# Patient Record
Sex: FEMALE | Race: WHITE | NOT HISPANIC OR LATINO | Employment: FULL TIME | URBAN - NONMETROPOLITAN AREA
[De-identification: names, ages, dates, MRNs, and addresses within clinical notes are randomized per-mention and may not be internally consistent; named-entity substitution may affect disease eponyms.]

---

## 2017-02-06 ENCOUNTER — TELEPHONE (OUTPATIENT)
Dept: INTERNAL MEDICINE | Facility: CLINIC | Age: 47
End: 2017-02-06

## 2017-02-06 DIAGNOSIS — E05.00 GRAVES' DISEASE: ICD-10-CM

## 2017-02-06 DIAGNOSIS — L40.9 PSORIASIS: Primary | ICD-10-CM

## 2017-02-06 RX ORDER — METHIMAZOLE 10 MG/1
20 TABLET ORAL 2 TIMES DAILY
Qty: 120 TABLET | Refills: 3 | Status: SHIPPED | OUTPATIENT
Start: 2017-02-06 | End: 2017-05-05

## 2017-02-06 NOTE — TELEPHONE ENCOUNTER
----- Message from Dominique  sent at 2/6/2017 11:44 AM CST -----  Regarding: Thyroid Medication  Contact: [phone]  She states that she doesn't think that her dosage of Thyroid medication is high enough.  She needs to know what she needs to do about getting an increase.

## 2017-05-05 ENCOUNTER — OFFICE VISIT (OUTPATIENT)
Dept: ENDOCRINOLOGY | Facility: CLINIC | Age: 47
End: 2017-05-05

## 2017-05-05 ENCOUNTER — LAB (OUTPATIENT)
Dept: LAB | Facility: HOSPITAL | Age: 47
End: 2017-05-05

## 2017-05-05 VITALS
HEART RATE: 110 BPM | DIASTOLIC BLOOD PRESSURE: 80 MMHG | SYSTOLIC BLOOD PRESSURE: 132 MMHG | BODY MASS INDEX: 34.64 KG/M2 | WEIGHT: 183.5 LBS | HEIGHT: 61 IN

## 2017-05-05 DIAGNOSIS — E55.9 VITAMIN D DEFICIENCY: ICD-10-CM

## 2017-05-05 DIAGNOSIS — E05.00 GRAVES' DISEASE: Primary | ICD-10-CM

## 2017-05-05 DIAGNOSIS — E53.8 B12 DEFICIENCY: ICD-10-CM

## 2017-05-05 DIAGNOSIS — L40.9 PSORIASIS: ICD-10-CM

## 2017-05-05 DIAGNOSIS — E05.00 GRAVES' DISEASE: ICD-10-CM

## 2017-05-05 LAB
25(OH)D3 SERPL-MCNC: <12.8 NG/ML (ref 30–100)
T3 SERPL-MCNC: 416 NG/DL (ref 97–169)
T4 FREE SERPL-MCNC: 4.86 NG/DL (ref 0.78–2.19)
TSH SERPL DL<=0.05 MIU/L-ACNC: <0.02 MIU/ML (ref 0.46–4.68)
VIT B12 BLD-MCNC: 512 PG/ML (ref 239–931)

## 2017-05-05 PROCEDURE — 84480 ASSAY TRIIODOTHYRONINE (T3): CPT | Performed by: INTERNAL MEDICINE

## 2017-05-05 PROCEDURE — 82306 VITAMIN D 25 HYDROXY: CPT | Performed by: INTERNAL MEDICINE

## 2017-05-05 PROCEDURE — 84443 ASSAY THYROID STIM HORMONE: CPT | Performed by: INTERNAL MEDICINE

## 2017-05-05 PROCEDURE — 86376 MICROSOMAL ANTIBODY EACH: CPT | Performed by: INTERNAL MEDICINE

## 2017-05-05 PROCEDURE — 84439 ASSAY OF FREE THYROXINE: CPT | Performed by: INTERNAL MEDICINE

## 2017-05-05 PROCEDURE — 36415 COLL VENOUS BLD VENIPUNCTURE: CPT

## 2017-05-05 PROCEDURE — 83516 IMMUNOASSAY NONANTIBODY: CPT | Performed by: INTERNAL MEDICINE

## 2017-05-05 PROCEDURE — 84481 FREE ASSAY (FT-3): CPT | Performed by: INTERNAL MEDICINE

## 2017-05-05 PROCEDURE — 82607 VITAMIN B-12: CPT | Performed by: INTERNAL MEDICINE

## 2017-05-05 PROCEDURE — 99204 OFFICE O/P NEW MOD 45 MIN: CPT | Performed by: INTERNAL MEDICINE

## 2017-05-05 PROCEDURE — 84445 ASSAY OF TSI GLOBULIN: CPT | Performed by: INTERNAL MEDICINE

## 2017-05-05 RX ORDER — CLOBETASOL PROPIONATE 0.5 MG/G
OINTMENT TOPICAL 2 TIMES DAILY
Qty: 2 EACH | Refills: 11 | Status: SHIPPED | OUTPATIENT
Start: 2017-05-05 | End: 2017-07-14

## 2017-05-05 RX ORDER — METHIMAZOLE 10 MG/1
10 TABLET ORAL 3 TIMES DAILY
Qty: 90 TABLET | Refills: 11 | Status: SHIPPED | OUTPATIENT
Start: 2017-05-05 | End: 2017-05-05

## 2017-05-05 RX ORDER — METHIMAZOLE 10 MG/1
10 TABLET ORAL DAILY
Qty: 30 TABLET | Refills: 11 | Status: SHIPPED | OUTPATIENT
Start: 2017-05-05 | End: 2018-05-05

## 2017-05-05 RX ORDER — ATENOLOL 25 MG/1
25 TABLET ORAL DAILY
Qty: 30 TABLET | Refills: 11 | Status: SHIPPED | OUTPATIENT
Start: 2017-05-05 | End: 2018-02-05

## 2017-05-07 LAB
T3FREE SERPL-MCNC: 14.7 PG/ML (ref 2–4.4)
THYROPEROXIDASE AB SERPL-ACNC: 137 IU/ML (ref 0–34)

## 2017-05-08 LAB
GLIADIN PEPTIDE IGA SER-ACNC: 5 UNITS (ref 0–19)
IGA SERPL-MCNC: 166 MG/DL (ref 87–352)
TTG IGA SER-ACNC: <2 U/ML (ref 0–3)

## 2017-05-10 LAB — TSI ACT/NOR SER: 405 % (ref 0–139)

## 2017-06-20 DIAGNOSIS — F41.9 PAIN AGGRAVATED BY ANXIETY: Primary | ICD-10-CM

## 2017-06-20 DIAGNOSIS — F45.41 PAIN AGGRAVATED BY ANXIETY: Primary | ICD-10-CM

## 2017-06-20 PROCEDURE — 96372 THER/PROPH/DIAG INJ SC/IM: CPT | Performed by: INTERNAL MEDICINE

## 2017-06-20 RX ORDER — TRIAMCINOLONE ACETONIDE 40 MG/ML
40 INJECTION, SUSPENSION INTRA-ARTICULAR; INTRAMUSCULAR ONCE
Status: COMPLETED | OUTPATIENT
Start: 2017-06-20 | End: 2017-06-20

## 2017-06-20 RX ADMIN — TRIAMCINOLONE ACETONIDE 40 MG: 40 INJECTION, SUSPENSION INTRA-ARTICULAR; INTRAMUSCULAR at 11:42

## 2017-06-21 ENCOUNTER — LAB (OUTPATIENT)
Dept: LAB | Facility: HOSPITAL | Age: 47
End: 2017-06-21

## 2017-06-21 ENCOUNTER — APPOINTMENT (OUTPATIENT)
Dept: LAB | Facility: HOSPITAL | Age: 47
End: 2017-06-21

## 2017-06-21 DIAGNOSIS — L40.9 PSORIASIS: ICD-10-CM

## 2017-06-21 DIAGNOSIS — E55.9 VITAMIN D DEFICIENCY: ICD-10-CM

## 2017-06-21 DIAGNOSIS — E05.00 GRAVES' DISEASE: ICD-10-CM

## 2017-06-21 DIAGNOSIS — E53.8 B12 DEFICIENCY: ICD-10-CM

## 2017-06-21 LAB
T4 FREE SERPL-MCNC: 2.03 NG/DL (ref 0.78–2.19)
TSH SERPL DL<=0.05 MIU/L-ACNC: <0.02 MIU/ML (ref 0.47–4.68)

## 2017-06-21 PROCEDURE — 84443 ASSAY THYROID STIM HORMONE: CPT | Performed by: INTERNAL MEDICINE

## 2017-06-21 PROCEDURE — 84439 ASSAY OF FREE THYROXINE: CPT | Performed by: INTERNAL MEDICINE

## 2017-06-21 PROCEDURE — 86038 ANTINUCLEAR ANTIBODIES: CPT | Performed by: INTERNAL MEDICINE

## 2017-06-21 PROCEDURE — 84480 ASSAY TRIIODOTHYRONINE (T3): CPT | Performed by: INTERNAL MEDICINE

## 2017-06-21 PROCEDURE — 36415 COLL VENOUS BLD VENIPUNCTURE: CPT

## 2017-06-23 LAB
ANA SER QL: NEGATIVE
Lab: NORMAL
T3 SERPL-MCNC: 92 NG/DL (ref 71–180)

## 2017-09-05 ENCOUNTER — OFFICE VISIT (OUTPATIENT)
Dept: ENDOCRINOLOGY | Facility: CLINIC | Age: 47
End: 2017-09-05

## 2017-09-05 VITALS
DIASTOLIC BLOOD PRESSURE: 80 MMHG | HEART RATE: 67 BPM | WEIGHT: 187.2 LBS | BODY MASS INDEX: 35.34 KG/M2 | HEIGHT: 61 IN | SYSTOLIC BLOOD PRESSURE: 132 MMHG

## 2017-09-05 DIAGNOSIS — E55.9 VITAMIN D DEFICIENCY: ICD-10-CM

## 2017-09-05 DIAGNOSIS — E53.8 B12 DEFICIENCY: ICD-10-CM

## 2017-09-05 DIAGNOSIS — E05.00 GRAVES' DISEASE: Primary | ICD-10-CM

## 2017-09-05 PROCEDURE — 99214 OFFICE O/P EST MOD 30 MIN: CPT | Performed by: INTERNAL MEDICINE

## 2017-09-05 NOTE — PROGRESS NOTES
Nicolas Amaro is a 47 y.o. female who presents for  evaluation of   Chief Complaint   Patient presents with   • Thyroid Problem       Referring provider  Primary Care Provider    Donny Rincon MD    Duration 10 years    Timing - Graves' Disease is constant    Started as Hypothyroidism , transitioned to hyperthyroidism     Quality -  poorly controlled before now taking methimazole     Severity -  moderate    Complications - Thyroid storm in the past    Current symptoms/problems  see ROS        Past Medical History:   Diagnosis Date   • Autoimmune thyroiditis    • Graves disease    • Graves disease    • History of cold-induced urticaria    • Migraines    • Multiple exostosis, hereditary    • Osteochondral defect      Family History   Problem Relation Age of Onset   • Kidney disease Father    • Diabetes Son    • Heart disease Maternal Grandmother    • Cancer Paternal Grandfather      Social History   Substance Use Topics   • Smoking status: Never Smoker   • Smokeless tobacco: Never Used   • Alcohol use No         Current Outpatient Prescriptions:   •  atenolol (TENORMIN) 25 MG tablet, Take 1 tablet by mouth Daily., Disp: 30 tablet, Rfl: 11  •  methIMAzole (TAPAZOLE) 10 MG tablet, Take 1 tablet by mouth Daily., Disp: 30 tablet, Rfl: 11    Review of Systems    Review of Systems   Constitutional: Positive for unexpected weight change. Negative for activity change, appetite change, chills, diaphoresis, fatigue and fever.   HENT: Negative for congestion, dental problem, drooling, ear discharge, ear pain, facial swelling, mouth sores, postnasal drip, rhinorrhea, sinus pressure, sore throat, tinnitus, trouble swallowing and voice change.    Eyes: Negative for photophobia, pain, discharge, redness, itching and visual disturbance.   Respiratory: Negative for apnea, cough, choking, chest tightness, shortness of breath, wheezing and stridor.    Cardiovascular: Negative for chest pain, palpitations and leg swelling.  "  Gastrointestinal: Negative for abdominal distention, abdominal pain, constipation, diarrhea, nausea and vomiting.   Endocrine: Positive for polyuria. Negative for cold intolerance, heat intolerance, polydipsia and polyphagia.   Genitourinary: Negative for decreased urine volume, difficulty urinating, dysuria, flank pain, frequency, hematuria, menstrual problem and urgency.   Musculoskeletal: Negative for arthralgias, back pain, gait problem, joint swelling, myalgias, neck pain and neck stiffness.   Skin: Negative for color change, pallor, rash and wound.   Allergic/Immunologic: Negative for immunocompromised state.   Neurological: Negative for dizziness, seizures, syncope, facial asymmetry, speech difficulty, weakness, light-headedness, numbness and headaches.   Hematological: Negative for adenopathy.   Psychiatric/Behavioral: Negative for agitation, behavioral problems, confusion, decreased concentration, dysphoric mood, hallucinations, self-injury, sleep disturbance and suicidal ideas. The patient is not nervous/anxious and is not hyperactive.         Objective:   /80 (BP Location: Right arm, Patient Position: Sitting, Cuff Size: Adult)  Pulse 67  Ht 61\" (154.9 cm)  Wt 187 lb 3.2 oz (84.9 kg)  BMI 35.37 kg/m2    Physical Exam   Constitutional: She is oriented to person, place, and time. She appears well-developed.   HENT:   Head: Normocephalic.   Right Ear: External ear normal.   Left Ear: External ear normal.   Nose: Nose normal.   Eyes: Conjunctivae and EOM are normal. No scleral icterus.   Neck: Normal range of motion. Neck supple. No tracheal deviation present. No thyromegaly present.   Cardiovascular: Normal rate, regular rhythm, normal heart sounds and intact distal pulses.  Exam reveals no gallop and no friction rub.    No murmur heard.  Pulmonary/Chest: Effort normal and breath sounds normal. No stridor. No respiratory distress. She has no wheezes. She has no rales. She exhibits no tenderness. "   Abdominal: Soft. Bowel sounds are normal. She exhibits no distension and no mass. There is no tenderness. There is no rebound and no guarding.   Musculoskeletal: Normal range of motion. She exhibits no tenderness or deformity.   Lymphadenopathy:     She has no cervical adenopathy.   Neurological: She is alert and oriented to person, place, and time. She displays normal reflexes. She exhibits normal muscle tone. Coordination normal.   Skin: No rash noted. No erythema. No pallor.   Psoriatic plaques feet    Psychiatric: She has a normal mood and affect. Her behavior is normal. Judgment and thought content normal.       Lab Review    Results for orders placed or performed in visit on 06/21/17   TSH   Result Value Ref Range    TSH <0.020 (L) 0.470 - 4.680 mIU/mL   T4, Free   Result Value Ref Range    Free T4 2.03 0.78 - 2.19 ng/dL   T3   Result Value Ref Range    T3, Total 92 71 - 180 ng/dL           Assessment/Plan       ICD-10-CM ICD-9-CM   1. Graves' disease E05.00 242.00   2. B12 deficiency E53.8 266.2   3. Vitamin D deficiency E55.9 268.9       Lab Results   Component Value Date    TSH <0.020 (L) 06/21/2017    TSH <0.020 (L) 05/05/2017    TSH <0.02 (L) 06/22/2016       Lab Results   Component Value Date    FREET4 2.03 06/21/2017    FREET4 4.86 (H) 05/05/2017    FREET4 4.13 (H) 06/22/2016       Lab Results   Component Value Date    V1XVSAF 92 06/21/2017    E6NISCX 416.0 (H) 05/05/2017       Thyroid Antibodies  TPO AB  Lab Results   Component Value Date    THYROIDAB 137 (H) 05/05/2017       Thyroid Stimulating Immunoglobulin    Lab Results   Component Value Date    LABTHYR 405 (H) 05/05/2017         Methimazole 15 mg daily     atenolol 25 mg daily         Other Diabetes Related Aspects       Lab Results   Component Value Date    CJWBIDFL72 512 05/05/2017       Assess       Last celiac panel     Lab Results   Component Value Date    GDPABIGA 5 05/05/2017    TTRANSGLIGA <2 05/05/2017     05/05/2017        Assess      psoariasis     Topical steroid    Kenalog injection, before     Arthralgias,     We ruled out celiac and sle in May 2017    I reviewed and summarized records from Donny Rincon MD from 2017 and I reviewed / ordered labs.     Orders Placed This Encounter   Procedures   • Thyroid Stimulating Immunoglobulin   • Thyroid Peroxidase Antibody   • Thyroid Antibodies   • T4, Free   • T3   • Comprehensive Metabolic Panel   • Comprehensive Metabolic Panel     Standing Status:   Future   • TSH     Standing Status:   Future   • T4, Free     Standing Status:   Future   • T3     Standing Status:   Future   • CBC & Differential     Order Specific Question:   Manual Differential     Answer:   No   • CBC & Differential     Standing Status:   Future     Order Specific Question:   Manual Differential     Answer:   No         A copy of my note was sent to Donny Rincon MD    Please see my above opinion and suggestions.

## 2017-12-15 ENCOUNTER — LAB (OUTPATIENT)
Dept: LAB | Facility: HOSPITAL | Age: 47
End: 2017-12-15

## 2017-12-15 DIAGNOSIS — L40.9 PSORIASIS: ICD-10-CM

## 2017-12-15 DIAGNOSIS — E53.8 B12 DEFICIENCY: ICD-10-CM

## 2017-12-15 DIAGNOSIS — E55.9 VITAMIN D DEFICIENCY: ICD-10-CM

## 2017-12-15 DIAGNOSIS — E05.00 GRAVES' DISEASE: ICD-10-CM

## 2017-12-15 LAB
ALBUMIN SERPL-MCNC: 4.6 G/DL (ref 3.5–5)
ALBUMIN/GLOB SERPL: 1.4 G/DL (ref 1.1–2.5)
ALP SERPL-CCNC: 135 U/L (ref 24–120)
ALT SERPL W P-5'-P-CCNC: 42 U/L (ref 0–54)
ANION GAP SERPL CALCULATED.3IONS-SCNC: 10 MMOL/L (ref 4–13)
AST SERPL-CCNC: 26 U/L (ref 7–45)
BASOPHILS # BLD AUTO: 0.03 10*3/MM3 (ref 0–0.2)
BASOPHILS NFR BLD AUTO: 0.4 % (ref 0–2)
BILIRUB SERPL-MCNC: 0.3 MG/DL (ref 0.1–1)
BUN BLD-MCNC: 15 MG/DL (ref 5–21)
BUN/CREAT SERPL: 15.6 (ref 7–25)
CALCIUM SPEC-SCNC: 9.2 MG/DL (ref 8.4–10.4)
CHLORIDE SERPL-SCNC: 103 MMOL/L (ref 98–110)
CO2 SERPL-SCNC: 29 MMOL/L (ref 24–31)
CREAT BLD-MCNC: 0.96 MG/DL (ref 0.5–1.4)
DEPRECATED RDW RBC AUTO: 43.4 FL (ref 40–54)
EOSINOPHIL # BLD AUTO: 0.08 10*3/MM3 (ref 0–0.7)
EOSINOPHIL NFR BLD AUTO: 0.9 % (ref 0–4)
ERYTHROCYTE [DISTWIDTH] IN BLOOD BY AUTOMATED COUNT: 13.2 % (ref 12–15)
GFR SERPL CREATININE-BSD FRML MDRD: 63 ML/MIN/1.73
GLOBULIN UR ELPH-MCNC: 3.3 GM/DL
GLUCOSE BLD-MCNC: 80 MG/DL (ref 70–100)
HCT VFR BLD AUTO: 38.3 % (ref 37–47)
HGB BLD-MCNC: 12.6 G/DL (ref 12–16)
IMM GRANULOCYTES # BLD: 0.01 10*3/MM3 (ref 0–0.03)
IMM GRANULOCYTES NFR BLD: 0.1 % (ref 0–5)
LYMPHOCYTES # BLD AUTO: 2.13 10*3/MM3 (ref 0.72–4.86)
LYMPHOCYTES NFR BLD AUTO: 25.1 % (ref 15–45)
MCH RBC QN AUTO: 29.2 PG (ref 28–32)
MCHC RBC AUTO-ENTMCNC: 32.9 G/DL (ref 33–36)
MCV RBC AUTO: 88.7 FL (ref 82–98)
MONOCYTES # BLD AUTO: 0.44 10*3/MM3 (ref 0.19–1.3)
MONOCYTES NFR BLD AUTO: 5.2 % (ref 4–12)
NEUTROPHILS # BLD AUTO: 5.78 10*3/MM3 (ref 1.87–8.4)
NEUTROPHILS NFR BLD AUTO: 68.3 % (ref 39–78)
NRBC BLD MANUAL-RTO: 0 /100 WBC (ref 0–0)
PLATELET # BLD AUTO: 247 10*3/MM3 (ref 130–400)
PMV BLD AUTO: 11.1 FL (ref 6–12)
POTASSIUM BLD-SCNC: 4 MMOL/L (ref 3.5–5.3)
PROT SERPL-MCNC: 7.9 G/DL (ref 6.3–8.7)
RBC # BLD AUTO: 4.32 10*6/MM3 (ref 4.2–5.4)
SODIUM BLD-SCNC: 142 MMOL/L (ref 135–145)
T4 FREE SERPL-MCNC: 1.14 NG/DL (ref 0.78–2.19)
TSH SERPL DL<=0.05 MIU/L-ACNC: 0.64 MIU/ML (ref 0.47–4.68)
WBC NRBC COR # BLD: 8.47 10*3/MM3 (ref 4.8–10.8)

## 2017-12-15 PROCEDURE — 86376 MICROSOMAL ANTIBODY EACH: CPT | Performed by: INTERNAL MEDICINE

## 2017-12-15 PROCEDURE — 84480 ASSAY TRIIODOTHYRONINE (T3): CPT | Performed by: INTERNAL MEDICINE

## 2017-12-15 PROCEDURE — 84439 ASSAY OF FREE THYROXINE: CPT | Performed by: INTERNAL MEDICINE

## 2017-12-15 PROCEDURE — 36415 COLL VENOUS BLD VENIPUNCTURE: CPT | Performed by: INTERNAL MEDICINE

## 2017-12-15 PROCEDURE — 86800 THYROGLOBULIN ANTIBODY: CPT | Performed by: INTERNAL MEDICINE

## 2017-12-15 PROCEDURE — 80050 GENERAL HEALTH PANEL: CPT | Performed by: INTERNAL MEDICINE

## 2017-12-15 PROCEDURE — 84445 ASSAY OF TSI GLOBULIN: CPT | Performed by: INTERNAL MEDICINE

## 2017-12-17 LAB — T3 SERPL-MCNC: 96 NG/DL (ref 71–180)

## 2017-12-18 LAB
THYROGLOB AB SERPL-ACNC: 1.3 IU/ML (ref 0–0.9)
THYROPEROXIDASE AB SERPL-ACNC: 104 IU/ML (ref 0–34)

## 2017-12-21 LAB — TSI ACT/NOR SER: 262 % (ref 0–139)

## 2018-04-10 PROCEDURE — 85025 COMPLETE CBC W/AUTO DIFF WBC: CPT | Performed by: NURSE PRACTITIONER

## 2018-04-10 PROCEDURE — 84439 ASSAY OF FREE THYROXINE: CPT | Performed by: NURSE PRACTITIONER

## 2018-04-10 PROCEDURE — 83001 ASSAY OF GONADOTROPIN (FSH): CPT | Performed by: NURSE PRACTITIONER

## 2018-04-10 PROCEDURE — 84443 ASSAY THYROID STIM HORMONE: CPT | Performed by: NURSE PRACTITIONER

## 2018-04-10 PROCEDURE — 80048 BASIC METABOLIC PNL TOTAL CA: CPT | Performed by: NURSE PRACTITIONER

## 2018-05-02 PROBLEM — F90.9 ADULT ATTENTION DEFICIT HYPERACTIVITY DISORDER: Status: ACTIVE | Noted: 2018-05-02

## 2018-05-02 PROCEDURE — 85025 COMPLETE CBC W/AUTO DIFF WBC: CPT | Performed by: NURSE PRACTITIONER

## 2018-05-04 ENCOUNTER — PROCEDURE VISIT (OUTPATIENT)
Dept: OBSTETRICS AND GYNECOLOGY | Facility: CLINIC | Age: 48
End: 2018-05-04

## 2018-05-04 ENCOUNTER — OFFICE VISIT (OUTPATIENT)
Dept: OBSTETRICS AND GYNECOLOGY | Facility: CLINIC | Age: 48
End: 2018-05-04

## 2018-05-04 VITALS
BODY MASS INDEX: 36.25 KG/M2 | SYSTOLIC BLOOD PRESSURE: 110 MMHG | HEIGHT: 61 IN | DIASTOLIC BLOOD PRESSURE: 70 MMHG | WEIGHT: 192 LBS

## 2018-05-04 DIAGNOSIS — N93.8 DUB (DYSFUNCTIONAL UTERINE BLEEDING): Primary | ICD-10-CM

## 2018-05-04 DIAGNOSIS — Z78.9 NON-SMOKER: ICD-10-CM

## 2018-05-04 DIAGNOSIS — R10.2 PELVIC PAIN: ICD-10-CM

## 2018-05-04 PROCEDURE — 99214 OFFICE O/P EST MOD 30 MIN: CPT | Performed by: NURSE PRACTITIONER

## 2018-05-04 PROCEDURE — 76830 TRANSVAGINAL US NON-OB: CPT | Performed by: OBSTETRICS & GYNECOLOGY

## 2018-05-04 RX ORDER — ATENOLOL 25 MG/1
25 TABLET ORAL DAILY
COMMUNITY
End: 2018-07-13 | Stop reason: SDUPTHER

## 2018-05-04 RX ORDER — MEDROXYPROGESTERONE ACETATE 10 MG/1
10 TABLET ORAL DAILY
Qty: 5 TABLET | Refills: 0 | Status: SHIPPED | OUTPATIENT
Start: 2018-05-04 | End: 2018-06-18

## 2018-05-04 NOTE — PROGRESS NOTES
Attempted to obtain health maintenance information, patient unable to provide answers for the following items Tdap.

## 2018-05-04 NOTE — PROGRESS NOTES
"Subjective   Nicolas Amaro is a 47 y.o. female.     Vaginal bleeding.     Pt reports constant bleeding since March 30. The bleeding varies from heavy to spotting with cramping.   Pt states normally periods are 3 wk 3 days apart, light with little cramping.       Vaginal Bleeding   The patient's primary symptoms include pelvic pain (cramping) and vaginal bleeding. The patient's pertinent negatives include no vaginal discharge. The current episode started 1 to 4 weeks ago. The problem occurs constantly. The problem has been waxing and waning. The pain is mild. The problem affects both sides. Pertinent negatives include no abdominal pain, constipation, diarrhea, fever, frequency, headaches, nausea or vomiting. Vaginal bleeding amount: varies from light spotting to heavy. She has not been passing clots. She has not been passing tissue. Nothing aggravates the symptoms. She has tried nothing for the symptoms. The treatment provided no relief. She uses tubal ligation for contraception. Menstrual history: regular until this episode.       The following portions of the patient's history were reviewed and updated as appropriate: allergies, current medications, past family history, past medical history, past social history, past surgical history and problem list.    /70 (BP Location: Left arm, Patient Position: Sitting, Cuff Size: Adult)   Ht 154.9 cm (61\")   Wt 87.1 kg (192 lb)   LMP 03/30/2018   BMI 36.28 kg/m²     Review of Systems   Constitutional: Negative for activity change, appetite change, fatigue and fever.        Pt denies changes in medications, wt, exercise, and diet.    Respiratory: Negative for apnea and shortness of breath.    Cardiovascular: Negative for chest pain and palpitations.   Gastrointestinal: Negative for abdominal distention, abdominal pain, constipation, diarrhea, nausea and vomiting.   Endocrine: Negative for cold intolerance and heat intolerance.   Genitourinary: Positive for " menstrual problem, pelvic pain (cramping) and vaginal bleeding. Negative for difficulty urinating, frequency, vaginal discharge and vaginal pain.   Neurological: Negative for headaches.   Psychiatric/Behavioral: Negative for agitation and sleep disturbance.       Objective   Physical Exam   Constitutional: She is oriented to person, place, and time. She appears well-developed.   Eyes: Right eye exhibits no discharge. Left eye exhibits no discharge.   Cardiovascular: Normal rate and regular rhythm.    Pulmonary/Chest: Effort normal and breath sounds normal.   Neurological: She is alert and oriented to person, place, and time.   Skin: Skin is warm.   Psychiatric: She has a normal mood and affect. Her behavior is normal. Judgment and thought content normal.   Vitals reviewed.      Assessment/Plan   Discussed US with pt.   US indicates endometrial thickness 17.22 mm, normal ovaries.    Pt declined pelvic exam today.   Discussed options to help stop the vaginal bleeding and further evaluation needed. Pt voiced understanding. Pt opts to take provera and return for annual exam and endometrial bx.     Patient's Body mass index is 36.28 kg/m². BMI is above normal parameters. Recommendations include: educational material.    RV endometrial bx and annual exam.   Nicolas was seen today for vaginal bleeding.    Diagnoses and all orders for this visit:    DUB (dysfunctional uterine bleeding)    BMI 36.0-36.9,adult    Non-smoker    Other orders  -     medroxyPROGESTERone (PROVERA) 10 MG tablet; Take 1 tablet by mouth Daily.

## 2018-05-09 NOTE — PATIENT INSTRUCTIONS

## 2018-05-29 RX ORDER — ATENOLOL 25 MG/1
TABLET ORAL
Qty: 30 TABLET | Refills: 0 | OUTPATIENT
Start: 2018-05-29

## 2018-06-18 ENCOUNTER — OFFICE VISIT (OUTPATIENT)
Dept: BARIATRICS/WEIGHT MGMT | Facility: CLINIC | Age: 48
End: 2018-06-18

## 2018-06-18 ENCOUNTER — OFFICE VISIT (OUTPATIENT)
Dept: BARIATRICS/WEIGHT MGMT | Facility: HOSPITAL | Age: 48
End: 2018-06-18

## 2018-06-18 VITALS
HEART RATE: 75 BPM | HEIGHT: 60 IN | WEIGHT: 190.6 LBS | BODY MASS INDEX: 37.42 KG/M2 | DIASTOLIC BLOOD PRESSURE: 79 MMHG | TEMPERATURE: 98.2 F | OXYGEN SATURATION: 99 % | SYSTOLIC BLOOD PRESSURE: 136 MMHG

## 2018-06-18 DIAGNOSIS — R03.0 ELEVATED BLOOD PRESSURE READING WITHOUT DIAGNOSIS OF HYPERTENSION: ICD-10-CM

## 2018-06-18 DIAGNOSIS — Z86.69 HISTORY OF MIGRAINE: ICD-10-CM

## 2018-06-18 DIAGNOSIS — E66.9 OBESITY, CLASS II, BMI 35-39.9: Primary | ICD-10-CM

## 2018-06-18 PROCEDURE — 97802 MEDICAL NUTRITION INDIV IN: CPT

## 2018-06-18 PROCEDURE — 99214 OFFICE O/P EST MOD 30 MIN: CPT | Performed by: NURSE PRACTITIONER

## 2018-06-18 RX ORDER — TOPIRAMATE 25 MG/1
25 TABLET ORAL 2 TIMES DAILY
Qty: 60 TABLET | Refills: 1 | Status: SHIPPED | OUTPATIENT
Start: 2018-06-18 | End: 2019-05-24

## 2018-06-18 NOTE — PATIENT INSTRUCTIONS
Nicolas Amaro is a 47 y.o. who has a history of morbid obesity and desires medically supervised weight loss. Patient will start topamax. Take as directed. SE reviewed. Not pregnant (had surgery). Will start Vyvanse per her PCP once approved through discount program. Patient will see the dietician today for make specific goals for diet, exercise, and lifestyle. Patient has received intensive behavioral therapy for obesity today. I will have them follow up in one month for a weight recheck and to further discuss options.

## 2018-06-18 NOTE — PROGRESS NOTES
"Subjective   Nicolas Amaro is a 47 y.o. female.     History of Present Illness   Nicolas Amaro is a 47 y.o. year-old who has obesity and is interested in having medically supervised weight loss. (She was last seen here about two years ago and for one visit only.) Patient has been overweight for the past 6-7 years. The most weight ever lost was 20  pounds from using phentermine for one month. Unsupervised diet attempts include: high protein, low fat, fasting, calorie counting, and low carb diet. Supervised diet attempts include: none. Patient has tried the following OTC or prescription medications for weight loss: phentermine for one month.  Patient states she tends to eat due to physical hunger and boredom. Patient is limited in activities due to: joint pain, chronic fatigue, and many problems with her ankles due to her bone disorder.   Vitals:    06/18/18 1044   BP: 136/79   BP Location: Right arm   Patient Position: Sitting   Cuff Size: Adult   Pulse: 75   Temp: 98.2 °F (36.8 °C)   SpO2: 99%   Weight: 86.5 kg (190 lb 9.6 oz)   Height: 151.8 cm (59.75\")     She  has a past medical history of Anxiety; Autoimmune thyroiditis; Fatigue; Graves disease; Graves disease; Graves disease; Hashimoto's thyroiditis; History of cold-induced urticaria; Migraines; Multiple exostosis, hereditary; and Osteochondral defect.  She  does not have any pertinent problems on file.  She  has a past surgical history that includes orthopedic surgery and Tubal ligation.  Her family history includes Cancer in her paternal grandfather; Diabetes in her son; Heart disease in her maternal grandmother; Kidney disease in her father.  She  reports that she has never smoked. She has never used smokeless tobacco. She reports that she does not drink alcohol or use drugs.  Current Outpatient Prescriptions   Medication Sig Dispense Refill   • atenolol (TENORMIN) 25 MG tablet Take 25 mg by mouth Daily.     • METHIMAZOLE PO Take  by mouth.     • " lisdexamfetamine dimesylate (VYVANSE) 10 MG capsule Take 10 mg by mouth Daily       No current facility-administered medications for this visit.      She is allergic to phenergan [promethazine]; promethazine hcl; and sulfa antibiotics.      The following portions of the patient's history were reviewed and updated as appropriate: allergies, current medications, past family history, past medical history, past social history, past surgical history and problem list.    Review of Systems   Constitutional: Positive for fatigue and unexpected weight change. Negative for activity change and appetite change.   Eyes: Negative.    Respiratory: Negative.  Negative for apnea, cough, chest tightness and shortness of breath.    Cardiovascular: Negative.  Negative for chest pain, palpitations and leg swelling.   Gastrointestinal: Negative.  Negative for abdominal pain, anal bleeding, constipation, nausea and vomiting.   Endocrine: Negative.         Hair loss   Genitourinary: Positive for menstrual problem.   Musculoskeletal: Positive for arthralgias. Negative for back pain.   Skin: Positive for rash.        psoriasis   Allergic/Immunologic: Negative.    Neurological: Positive for headaches. Negative for seizures and syncope.   Hematological: Bruises/bleeds easily.   Psychiatric/Behavioral: Negative for dysphoric mood, self-injury and sleep disturbance. The patient is nervous/anxious.         ADD       Objective   Physical Exam   Constitutional: She is oriented to person, place, and time. Vital signs are normal. She appears well-developed and well-nourished. She is cooperative. No distress.   HENT:   Head: Normocephalic and atraumatic.   Nose: Nose normal.   Mouth/Throat: Oropharynx is clear and moist. No oropharyngeal exudate or tonsillar abscesses.   Eyes: Conjunctivae, EOM and lids are normal. Pupils are equal, round, and reactive to light. Right eye exhibits no discharge. Left eye exhibits no discharge.   Neck: Trachea normal.  Neck supple. No JVD present. Carotid bruit is not present. No neck rigidity. No tracheal deviation present. No thyromegaly present.   Cardiovascular: Normal rate, regular rhythm, S1 normal, S2 normal and normal heart sounds.    Pulmonary/Chest: Effort normal and breath sounds normal. No stridor. No respiratory distress. She has no wheezes. She has no rales.   Abdominal: Soft. Bowel sounds are normal. She exhibits no distension. There is no tenderness.   obese   Musculoskeletal: She exhibits no edema.        Right shoulder: She exhibits normal strength.   Lymphadenopathy:     She has no cervical adenopathy.   Neurological: She is alert and oriented to person, place, and time. She has normal strength. No cranial nerve deficit.   Skin: Skin is warm, dry and intact. No rash noted.   Psychiatric: She has a normal mood and affect. Her speech is normal and behavior is normal.   Alert and oriented x 3   Vitals reviewed.      Assessment/Plan   Nicolas was seen today for consult, obesity, nutrition counseling and weight loss.    Diagnoses and all orders for this visit:    Obesity, Class II, BMI 35-39.9  -     Bariatric Nutritional Counseling; Standing    Elevated blood pressure (not hypertension)    History of migraine    Other orders  -     topiramate (TOPAMAX) 25 MG tablet; Take 1 tablet by mouth 2 (Two) Times a Day.        Nicolas Amaro is a 47 y.o. who has a history of morbid obesity and desires medically supervised weight loss. Patient will start topamax. Take as directed. SE reviewed. Not pregnant (had surgery). Will start Vyvanse per her PCP once approved through discount program. Patient will see the dietician today for make specific goals for diet, exercise, and lifestyle. Patient has received intensive behavioral therapy for obesity today. I will have them follow up in one month for a weight recheck and to further discuss options.

## 2018-06-18 NOTE — PROGRESS NOTES
"Nutrition Bariatric/[facility] Note     Visit   Initial Assessment     Anthropometrics   Height: 59.75\"  Weight: 190.5#  BMI: 37.5    Waist: 46\"  Hip: 49\"  Chest: 46\"  Thigh: 25.5\"  Arm: 14\"  Body Fat: 45.3%    Nutrition Recall  24 Hour recall:  (B) skips meal OR toast w/butter OR cereal, banana. Diet soda or diet tea or milk.  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 Fast food & [employer] when working (D) skips meal  Eating 2 meals daily   Protein lacking at breakfast  Snacking: chips, sweets.  Stopped snacking during past 2 weeks.  Recently began limiting sweet intake  Large portions  Drinking with meals   Drinking carbonated beverages. Decreased during past 2 weeks from 32 oz per day.  Drinking less than 64 fluid ounces    Exercise   None    Habits:  None    Education    Goal Setting and Information Packet    Nutrition Goals   Continue diet changes  Eat 3-4 meals per day with protein  Eat protein first at meals  Eliminate snacks  Healthier food choices  Portion control / Use smaller plate or measuring cup   Eliminate soda  Increase fluid intake to 64 ounces per day    Exercise Goals  Add 15-30 minutes of walking, cycling, elliptical, swimming, chair, yoga or crossfit daily    Tracy Rocha RD, DAVINA  06/18/2018  10:51 AM    "

## 2018-06-26 ENCOUNTER — TELEPHONE (OUTPATIENT)
Dept: ENDOCRINOLOGY | Facility: CLINIC | Age: 48
End: 2018-06-26

## 2018-06-26 RX ORDER — METHIMAZOLE 10 MG/1
15 TABLET ORAL DAILY
Qty: 45 TABLET | Refills: 2 | Status: SHIPPED | OUTPATIENT
Start: 2018-06-26 | End: 2019-03-07 | Stop reason: SDUPTHER

## 2018-07-13 ENCOUNTER — RESULTS ENCOUNTER (OUTPATIENT)
Dept: BARIATRICS/WEIGHT MGMT | Facility: CLINIC | Age: 48
End: 2018-07-13

## 2018-07-13 DIAGNOSIS — E66.9 OBESITY, CLASS II, BMI 35-39.9: ICD-10-CM

## 2018-07-13 RX ORDER — ATENOLOL 25 MG/1
25 TABLET ORAL DAILY
Qty: 30 TABLET | Refills: 3 | Status: SHIPPED | OUTPATIENT
Start: 2018-07-13 | End: 2019-03-06 | Stop reason: SDUPTHER

## 2018-08-10 ENCOUNTER — RESULTS ENCOUNTER (OUTPATIENT)
Dept: BARIATRICS/WEIGHT MGMT | Facility: CLINIC | Age: 48
End: 2018-08-10

## 2018-08-10 DIAGNOSIS — E66.9 OBESITY, CLASS II, BMI 35-39.9: ICD-10-CM

## 2018-09-07 ENCOUNTER — RESULTS ENCOUNTER (OUTPATIENT)
Dept: BARIATRICS/WEIGHT MGMT | Facility: CLINIC | Age: 48
End: 2018-09-07

## 2018-09-07 DIAGNOSIS — E66.9 OBESITY, CLASS II, BMI 35-39.9: ICD-10-CM

## 2018-10-05 ENCOUNTER — RESULTS ENCOUNTER (OUTPATIENT)
Dept: BARIATRICS/WEIGHT MGMT | Facility: CLINIC | Age: 48
End: 2018-10-05

## 2018-10-05 DIAGNOSIS — E66.9 OBESITY, CLASS II, BMI 35-39.9: ICD-10-CM

## 2018-11-02 ENCOUNTER — RESULTS ENCOUNTER (OUTPATIENT)
Dept: BARIATRICS/WEIGHT MGMT | Facility: CLINIC | Age: 48
End: 2018-11-02

## 2018-11-02 DIAGNOSIS — E66.9 OBESITY, CLASS II, BMI 35-39.9: ICD-10-CM

## 2018-11-30 ENCOUNTER — RESULTS ENCOUNTER (OUTPATIENT)
Dept: BARIATRICS/WEIGHT MGMT | Facility: CLINIC | Age: 48
End: 2018-11-30

## 2018-11-30 DIAGNOSIS — E66.9 OBESITY, CLASS II, BMI 35-39.9: ICD-10-CM

## 2018-12-28 ENCOUNTER — TELEPHONE (OUTPATIENT)
Dept: FAMILY MEDICINE CLINIC | Facility: CLINIC | Age: 48
End: 2018-12-28

## 2018-12-28 ENCOUNTER — RESULTS ENCOUNTER (OUTPATIENT)
Dept: BARIATRICS/WEIGHT MGMT | Facility: CLINIC | Age: 48
End: 2018-12-28

## 2018-12-28 DIAGNOSIS — E66.9 OBESITY, CLASS II, BMI 35-39.9: ICD-10-CM

## 2019-01-02 DIAGNOSIS — E55.9 VITAMIN D DEFICIENCY: ICD-10-CM

## 2019-01-02 DIAGNOSIS — E53.8 B12 DEFICIENCY: ICD-10-CM

## 2019-01-02 DIAGNOSIS — E05.00 GRAVES' DISEASE: Primary | ICD-10-CM

## 2019-01-18 ENCOUNTER — APPOINTMENT (OUTPATIENT)
Dept: LAB | Facility: HOSPITAL | Age: 49
End: 2019-01-18

## 2019-01-18 LAB
25(OH)D3 SERPL-MCNC: 17.1 NG/ML (ref 30–100)
ALBUMIN SERPL-MCNC: 4.4 G/DL (ref 3.5–5)
ALBUMIN/GLOB SERPL: 1.5 G/DL (ref 1.1–2.5)
ALP SERPL-CCNC: 128 U/L (ref 24–120)
ALT SERPL W P-5'-P-CCNC: 28 U/L (ref 0–54)
ANION GAP SERPL CALCULATED.3IONS-SCNC: 9 MMOL/L (ref 4–13)
AST SERPL-CCNC: 39 U/L (ref 7–45)
BASOPHILS # BLD AUTO: 0.04 10*3/MM3 (ref 0–0.2)
BASOPHILS NFR BLD AUTO: 0.5 % (ref 0–2)
BILIRUB SERPL-MCNC: 0.3 MG/DL (ref 0.1–1)
BUN BLD-MCNC: 15 MG/DL (ref 5–21)
BUN/CREAT SERPL: 20 (ref 7–25)
CALCIUM SPEC-SCNC: 8.9 MG/DL (ref 8.4–10.4)
CHLORIDE SERPL-SCNC: 103 MMOL/L (ref 98–110)
CO2 SERPL-SCNC: 29 MMOL/L (ref 24–31)
CREAT BLD-MCNC: 0.75 MG/DL (ref 0.5–1.4)
DEPRECATED RDW RBC AUTO: 46.5 FL (ref 40–54)
EOSINOPHIL # BLD AUTO: 0.09 10*3/MM3 (ref 0–0.7)
EOSINOPHIL NFR BLD AUTO: 1.1 % (ref 0–4)
ERYTHROCYTE [DISTWIDTH] IN BLOOD BY AUTOMATED COUNT: 14.8 % (ref 12–15)
GFR SERPL CREATININE-BSD FRML MDRD: 83 ML/MIN/1.73
GLOBULIN UR ELPH-MCNC: 3 GM/DL
GLUCOSE BLD-MCNC: 76 MG/DL (ref 70–100)
HCT VFR BLD AUTO: 30.3 % (ref 37–47)
HGB BLD-MCNC: 9.6 G/DL (ref 12–16)
IMM GRANULOCYTES # BLD AUTO: 0.02 10*3/MM3 (ref 0–0.03)
IMM GRANULOCYTES NFR BLD AUTO: 0.3 % (ref 0–5)
LYMPHOCYTES # BLD AUTO: 2.09 10*3/MM3 (ref 0.72–4.86)
LYMPHOCYTES NFR BLD AUTO: 26.6 % (ref 15–45)
MCH RBC QN AUTO: 27.3 PG (ref 28–32)
MCHC RBC AUTO-ENTMCNC: 31.7 G/DL (ref 33–36)
MCV RBC AUTO: 86.1 FL (ref 82–98)
MONOCYTES # BLD AUTO: 0.56 10*3/MM3 (ref 0.19–1.3)
MONOCYTES NFR BLD AUTO: 7.1 % (ref 4–12)
NEUTROPHILS # BLD AUTO: 5.06 10*3/MM3 (ref 1.87–8.4)
NEUTROPHILS NFR BLD AUTO: 64.4 % (ref 39–78)
NRBC BLD AUTO-RTO: 0 /100 WBC (ref 0–0)
PLATELET # BLD AUTO: 278 10*3/MM3 (ref 130–400)
PMV BLD AUTO: 11.4 FL (ref 6–12)
POTASSIUM BLD-SCNC: 3.7 MMOL/L (ref 3.5–5.3)
PROT SERPL-MCNC: 7.4 G/DL (ref 6.3–8.7)
RBC # BLD AUTO: 3.52 10*6/MM3 (ref 4.2–5.4)
SODIUM BLD-SCNC: 141 MMOL/L (ref 135–145)
T4 FREE SERPL-MCNC: 1.49 NG/DL (ref 0.78–2.19)
TSH SERPL DL<=0.05 MIU/L-ACNC: <0.02 MIU/ML (ref 0.47–4.68)
VIT B12 BLD-MCNC: 573 PG/ML (ref 239–931)
WBC NRBC COR # BLD: 7.86 10*3/MM3 (ref 4.8–10.8)

## 2019-01-18 PROCEDURE — 82306 VITAMIN D 25 HYDROXY: CPT | Performed by: INTERNAL MEDICINE

## 2019-01-18 PROCEDURE — 84439 ASSAY OF FREE THYROXINE: CPT | Performed by: INTERNAL MEDICINE

## 2019-01-18 PROCEDURE — 80050 GENERAL HEALTH PANEL: CPT | Performed by: INTERNAL MEDICINE

## 2019-01-18 PROCEDURE — 36415 COLL VENOUS BLD VENIPUNCTURE: CPT | Performed by: INTERNAL MEDICINE

## 2019-01-18 PROCEDURE — 84481 FREE ASSAY (FT-3): CPT | Performed by: INTERNAL MEDICINE

## 2019-01-18 PROCEDURE — 82607 VITAMIN B-12: CPT | Performed by: INTERNAL MEDICINE

## 2019-01-19 LAB — T3FREE SERPL-MCNC: 3.4 PG/ML (ref 2–4.4)

## 2019-01-25 ENCOUNTER — RESULTS ENCOUNTER (OUTPATIENT)
Dept: BARIATRICS/WEIGHT MGMT | Facility: CLINIC | Age: 49
End: 2019-01-25

## 2019-01-25 DIAGNOSIS — E66.9 OBESITY, CLASS II, BMI 35-39.9: ICD-10-CM

## 2019-02-01 ENCOUNTER — TELEPHONE (OUTPATIENT)
Dept: FAMILY MEDICINE CLINIC | Facility: CLINIC | Age: 49
End: 2019-02-01

## 2019-02-01 NOTE — TELEPHONE ENCOUNTER
Patient was calling to get her lab results she had done 1/18/19. She states she needs to know if there is any adjustments needed on her meds. Call her at [phone]. Patient states she wanted you to know that she has not had her BP meds in a few months. So her results are without that medication

## 2019-02-04 NOTE — TELEPHONE ENCOUNTER
Patient was calling to get her lab results she had done 1/18/19. She states she needs to know if there is any adjustments needed on her meds. Call her at [phone]. Patient states she wanted you to know that she has not had her BP meds in a few months. So her results are without that medication

## 2019-02-05 ENCOUNTER — TELEPHONE (OUTPATIENT)
Dept: ENDOCRINOLOGY | Facility: CLINIC | Age: 49
End: 2019-02-05

## 2019-02-05 NOTE — TELEPHONE ENCOUNTER
I haven't seen her since 2017. She has cancelled multiple times. She has to know it is not right she is asking for labs over the phone. Keep appt this time

## 2019-02-22 ENCOUNTER — RESULTS ENCOUNTER (OUTPATIENT)
Dept: BARIATRICS/WEIGHT MGMT | Facility: CLINIC | Age: 49
End: 2019-02-22

## 2019-02-22 DIAGNOSIS — E66.9 OBESITY, CLASS II, BMI 35-39.9: ICD-10-CM

## 2019-03-06 ENCOUNTER — OFFICE VISIT (OUTPATIENT)
Dept: ENDOCRINOLOGY | Facility: CLINIC | Age: 49
End: 2019-03-06

## 2019-03-06 ENCOUNTER — APPOINTMENT (OUTPATIENT)
Dept: LAB | Facility: HOSPITAL | Age: 49
End: 2019-03-06

## 2019-03-06 VITALS
DIASTOLIC BLOOD PRESSURE: 78 MMHG | HEART RATE: 67 BPM | HEIGHT: 60 IN | WEIGHT: 187 LBS | SYSTOLIC BLOOD PRESSURE: 134 MMHG | OXYGEN SATURATION: 98 % | BODY MASS INDEX: 36.71 KG/M2

## 2019-03-06 DIAGNOSIS — E55.9 VITAMIN D DEFICIENCY: ICD-10-CM

## 2019-03-06 DIAGNOSIS — E05.00 GRAVES' DISEASE: Primary | ICD-10-CM

## 2019-03-06 DIAGNOSIS — E53.8 B12 DEFICIENCY: ICD-10-CM

## 2019-03-06 LAB
ALBUMIN SERPL-MCNC: 4.5 G/DL (ref 3.4–4.8)
ALBUMIN/GLOB SERPL: 1.2 G/DL (ref 1.1–1.8)
ALP SERPL-CCNC: 210 U/L (ref 38–126)
ALT SERPL W P-5'-P-CCNC: 135 U/L (ref 9–52)
ANION GAP SERPL CALCULATED.3IONS-SCNC: 10 MMOL/L (ref 5–15)
AST SERPL-CCNC: 147 U/L (ref 14–36)
BASOPHILS # BLD AUTO: 0.03 10*3/MM3 (ref 0–0.2)
BASOPHILS NFR BLD AUTO: 0.4 % (ref 0–1.5)
BILIRUB SERPL-MCNC: 0.2 MG/DL (ref 0.2–1.3)
BUN BLD-MCNC: 14 MG/DL (ref 7–21)
BUN/CREAT SERPL: 23.3 (ref 7–25)
CALCIUM SPEC-SCNC: 9.9 MG/DL (ref 8.4–10.2)
CHLORIDE SERPL-SCNC: 97 MMOL/L (ref 95–110)
CO2 SERPL-SCNC: 30 MMOL/L (ref 22–31)
CREAT BLD-MCNC: 0.6 MG/DL (ref 0.5–1)
DEPRECATED RDW RBC AUTO: 46.1 FL (ref 37–54)
EOSINOPHIL # BLD AUTO: 0.05 10*3/MM3 (ref 0–0.4)
EOSINOPHIL NFR BLD AUTO: 0.6 % (ref 0.3–6.2)
ERYTHROCYTE [DISTWIDTH] IN BLOOD BY AUTOMATED COUNT: 16.2 % (ref 12.3–15.4)
GFR SERPL CREATININE-BSD FRML MDRD: 107 ML/MIN/1.73 (ref 58–135)
GLOBULIN UR ELPH-MCNC: 3.7 GM/DL (ref 2.3–3.5)
GLUCOSE BLD-MCNC: 101 MG/DL (ref 60–100)
HCT VFR BLD AUTO: 33.9 % (ref 34–46.6)
HGB BLD-MCNC: 10.6 G/DL (ref 12–15.9)
IMM GRANULOCYTES # BLD AUTO: 0.03 10*3/MM3 (ref 0–0.05)
IMM GRANULOCYTES NFR BLD AUTO: 0.4 % (ref 0–0.5)
LYMPHOCYTES # BLD AUTO: 1.93 10*3/MM3 (ref 0.7–3.1)
LYMPHOCYTES NFR BLD AUTO: 24.8 % (ref 19.6–45.3)
MCH RBC QN AUTO: 25.1 PG (ref 26.6–33)
MCHC RBC AUTO-ENTMCNC: 31.3 G/DL (ref 31.5–35.7)
MCV RBC AUTO: 80.1 FL (ref 79–97)
MONOCYTES # BLD AUTO: 0.56 10*3/MM3 (ref 0.1–0.9)
MONOCYTES NFR BLD AUTO: 7.2 % (ref 5–12)
NEUTROPHILS # BLD AUTO: 5.17 10*3/MM3 (ref 1.4–7)
NEUTROPHILS NFR BLD AUTO: 66.6 % (ref 42.7–76)
NRBC BLD AUTO-RTO: 0 /100 WBC (ref 0–0)
PLATELET # BLD AUTO: 288 10*3/MM3 (ref 140–450)
PMV BLD AUTO: 11.7 FL (ref 6–12)
POTASSIUM BLD-SCNC: 4 MMOL/L (ref 3.5–5.1)
PROT SERPL-MCNC: 8.2 G/DL (ref 6.3–8.6)
RBC # BLD AUTO: 4.23 10*6/MM3 (ref 3.77–5.28)
SODIUM BLD-SCNC: 137 MMOL/L (ref 137–145)
WBC NRBC COR # BLD: 7.77 10*3/MM3 (ref 3.4–10.8)

## 2019-03-06 PROCEDURE — 82306 VITAMIN D 25 HYDROXY: CPT | Performed by: INTERNAL MEDICINE

## 2019-03-06 PROCEDURE — 84439 ASSAY OF FREE THYROXINE: CPT | Performed by: INTERNAL MEDICINE

## 2019-03-06 PROCEDURE — 80050 GENERAL HEALTH PANEL: CPT | Performed by: INTERNAL MEDICINE

## 2019-03-06 PROCEDURE — 82607 VITAMIN B-12: CPT | Performed by: INTERNAL MEDICINE

## 2019-03-06 PROCEDURE — 99214 OFFICE O/P EST MOD 30 MIN: CPT | Performed by: INTERNAL MEDICINE

## 2019-03-06 PROCEDURE — 84481 FREE ASSAY (FT-3): CPT | Performed by: INTERNAL MEDICINE

## 2019-03-06 PROCEDURE — 36415 COLL VENOUS BLD VENIPUNCTURE: CPT | Performed by: INTERNAL MEDICINE

## 2019-03-06 RX ORDER — ATENOLOL 25 MG/1
25 TABLET ORAL DAILY
Qty: 30 TABLET | Refills: 6 | Status: SHIPPED | OUTPATIENT
Start: 2019-03-06 | End: 2019-06-11

## 2019-03-06 RX ORDER — ATENOLOL 25 MG/1
25 TABLET ORAL DAILY
Qty: 30 TABLET | Refills: 3 | Status: SHIPPED | OUTPATIENT
Start: 2019-03-06 | End: 2019-03-06 | Stop reason: SDUPTHER

## 2019-03-06 NOTE — PROGRESS NOTES
Nicolas Amaro is a 48 y.o. female who presents for  evaluation of   Chief Complaint   Patient presents with   • Graves' Disease       Referring provider  Primary Care Provider    Donny Rincon MD    Duration 10 years    Timing - Graves' Disease is constant    Started as Hypothyroidism , transitioned to hyperthyroidism     Quality -  poorly controlled noncompliant with methimazole     Severity -  moderate    Complications - Thyroid storm in the past    Current symptoms/problems  see ROS        Past Medical History:   Diagnosis Date   • Anxiety    • Autoimmune thyroiditis    • Fatigue    • Graves disease    • Graves disease    • Graves disease    • Hashimoto's thyroiditis    • History of cold-induced urticaria    • Migraines    • Multiple exostosis, hereditary    • Osteochondral defect      Family History   Problem Relation Age of Onset   • Kidney disease Father    • Diabetes Son    • Heart disease Maternal Grandmother    • Cancer Paternal Grandfather    • Breast cancer Neg Hx    • Ovarian cancer Neg Hx    • Uterine cancer Neg Hx    • Colon cancer Neg Hx      Social History     Tobacco Use   • Smoking status: Never Smoker   • Smokeless tobacco: Never Used   Substance Use Topics   • Alcohol use: No   • Drug use: No         Current Outpatient Medications:   •  atenolol (TENORMIN) 25 MG tablet, Take 1 tablet by mouth Daily., Disp: 30 tablet, Rfl: 3  •  lisdexamfetamine dimesylate (VYVANSE) 10 MG capsule, Take 10 mg by mouth Daily, Disp: , Rfl:   •  methIMAzole (TAPAZOLE) 10 MG tablet, Take 1.5 tablets by mouth Daily., Disp: 45 tablet, Rfl: 2  •  topiramate (TOPAMAX) 25 MG tablet, Take 1 tablet by mouth 2 (Two) Times a Day., Disp: 60 tablet, Rfl: 1    Review of Systems    Review of Systems   Constitutional: Positive for unexpected weight change. Negative for activity change, appetite change, chills, diaphoresis, fatigue and fever.   HENT: Negative for congestion, dental problem, drooling, ear discharge, ear pain,  "facial swelling, mouth sores, postnasal drip, rhinorrhea, sinus pressure, sore throat, tinnitus, trouble swallowing and voice change.    Eyes: Negative for photophobia, pain, discharge, redness, itching and visual disturbance.   Respiratory: Negative for apnea, cough, choking, chest tightness, shortness of breath, wheezing and stridor.    Cardiovascular: Negative for chest pain, palpitations and leg swelling.   Gastrointestinal: Negative for abdominal distention, abdominal pain, constipation, diarrhea, nausea and vomiting.   Endocrine: Positive for polyuria. Negative for cold intolerance, heat intolerance, polydipsia and polyphagia.   Genitourinary: Negative for decreased urine volume, difficulty urinating, dysuria, flank pain, frequency, hematuria, menstrual problem and urgency.   Musculoskeletal: Negative for arthralgias, back pain, gait problem, joint swelling, myalgias, neck pain and neck stiffness.   Skin: Negative for color change, pallor, rash and wound.   Allergic/Immunologic: Negative for immunocompromised state.   Neurological: Negative for dizziness, seizures, syncope, facial asymmetry, speech difficulty, weakness, light-headedness, numbness and headaches.   Hematological: Negative for adenopathy.   Psychiatric/Behavioral: Negative for agitation, behavioral problems, confusion, decreased concentration, dysphoric mood, hallucinations, self-injury, sleep disturbance and suicidal ideas. The patient is not nervous/anxious and is not hyperactive.         Objective:   /78   Pulse 67   Ht 151.6 cm (59.7\")   Wt 84.8 kg (187 lb)   SpO2 98%   BMI 36.89 kg/m²     Physical Exam   Constitutional: She is oriented to person, place, and time. She appears well-developed.   HENT:   Head: Normocephalic.   Right Ear: External ear normal.   Left Ear: External ear normal.   Nose: Nose normal.   Eyes: Conjunctivae and EOM are normal. No scleral icterus.   Neck: Normal range of motion. Neck supple. No tracheal " deviation present. No thyromegaly present.   Cardiovascular: Normal rate, regular rhythm, normal heart sounds and intact distal pulses. Exam reveals no gallop and no friction rub.   No murmur heard.  Pulmonary/Chest: Effort normal and breath sounds normal. No stridor. No respiratory distress. She has no wheezes. She has no rales. She exhibits no tenderness.   Abdominal: Soft. Bowel sounds are normal. She exhibits no distension and no mass. There is no tenderness. There is no rebound and no guarding.   Musculoskeletal: Normal range of motion. She exhibits no tenderness or deformity.   Lymphadenopathy:     She has no cervical adenopathy.   Neurological: She is alert and oriented to person, place, and time. She displays normal reflexes. She exhibits normal muscle tone. Coordination normal.   Skin: No rash noted. No erythema. No pallor.   Psoriatic plaques feet    Psychiatric: She has a normal mood and affect. Her behavior is normal. Judgment and thought content normal.       Lab Review    Results for orders placed or performed in visit on 01/02/19   TSH   Result Value Ref Range    TSH <0.020 (L) 0.470 - 4.680 mIU/mL   T4, Free   Result Value Ref Range    Free T4 1.49 0.78 - 2.19 ng/dL   T3, Free   Result Value Ref Range    T3, Free 3.4 2.0 - 4.4 pg/mL   Vitamin D 25 Hydroxy   Result Value Ref Range    25 Hydroxy, Vitamin D 17.1 (L) 30.0 - 100.0 ng/ml   Vitamin B12   Result Value Ref Range    Vitamin B-12 573 239 - 931 pg/mL   Comprehensive Metabolic Panel   Result Value Ref Range    Glucose 76 70 - 100 mg/dL    BUN 15 5 - 21 mg/dL    Creatinine 0.75 0.50 - 1.40 mg/dL    Sodium 141 135 - 145 mmol/L    Potassium 3.7 3.5 - 5.3 mmol/L    Chloride 103 98 - 110 mmol/L    CO2 29.0 24.0 - 31.0 mmol/L    Calcium 8.9 8.4 - 10.4 mg/dL    Total Protein 7.4 6.3 - 8.7 g/dL    Albumin 4.40 3.50 - 5.00 g/dL    ALT (SGPT) 28 0 - 54 U/L    AST (SGOT) 39 7 - 45 U/L    Alkaline Phosphatase 128 (H) 24 - 120 U/L    Total Bilirubin 0.3 0.1 -  1.0 mg/dL    eGFR Non African Amer 83 >60 mL/min/1.73    Globulin 3.0 gm/dL    A/G Ratio 1.5 1.1 - 2.5 g/dL    BUN/Creatinine Ratio 20.0 7.0 - 25.0    Anion Gap 9.0 4.0 - 13.0 mmol/L   CBC Auto Differential   Result Value Ref Range    WBC 7.86 4.80 - 10.80 10*3/mm3    RBC 3.52 (L) 4.20 - 5.40 10*6/mm3    Hemoglobin 9.6 (L) 12.0 - 16.0 g/dL    Hematocrit 30.3 (L) 37.0 - 47.0 %    MCV 86.1 82.0 - 98.0 fL    MCH 27.3 (L) 28.0 - 32.0 pg    MCHC 31.7 (L) 33.0 - 36.0 g/dL    RDW 14.8 12.0 - 15.0 %    RDW-SD 46.5 40.0 - 54.0 fl    MPV 11.4 6.0 - 12.0 fL    Platelets 278 130 - 400 10*3/mm3    Neutrophil % 64.4 39.0 - 78.0 %    Lymphocyte % 26.6 15.0 - 45.0 %    Monocyte % 7.1 4.0 - 12.0 %    Eosinophil % 1.1 0.0 - 4.0 %    Basophil % 0.5 0.0 - 2.0 %    Immature Grans % 0.3 0.0 - 5.0 %    Neutrophils, Absolute 5.06 1.87 - 8.40 10*3/mm3    Lymphocytes, Absolute 2.09 0.72 - 4.86 10*3/mm3    Monocytes, Absolute 0.56 0.19 - 1.30 10*3/mm3    Eosinophils, Absolute 0.09 0.00 - 0.70 10*3/mm3    Basophils, Absolute 0.04 0.00 - 0.20 10*3/mm3    Immature Grans, Absolute 0.02 0.00 - 0.03 10*3/mm3    nRBC 0.0 0.0 - 0.0 /100 WBC           Assessment/Plan       ICD-10-CM ICD-9-CM   1. Graves' disease E05.00 242.00   2. B12 deficiency E53.8 266.2   3. Vitamin D deficiency E55.9 268.9   4. Psoriasis L40.9 696.1       Lab Results   Component Value Date    TSH <0.020 (L) 01/18/2019    TSH 0.852 04/10/2018    TSH 0.641 12/15/2017       Lab Results   Component Value Date    FREET4 1.49 01/18/2019    FREET4 1.09 04/10/2018    FREET4 1.14 12/15/2017       Lab Results   Component Value Date    X2BBFVM 96 12/15/2017    C6GPUYO 92 06/21/2017    E3AEQMQ 416.0 (H) 05/05/2017       Thyroid Antibodies  TPO AB  Lab Results   Component Value Date    THYROIDAB 104 (H) 12/15/2017       Thyroid Stimulating Immunoglobulin    Lab Results   Component Value Date    LABTHYR 262 (H) 12/15/2017         Methimazole 10 mg daily for the past 3 weeks    Check levels  today and labs every 2 months and appt every 6 months     atenolol 25 mg daily - may use PRN for palpitations        Assess      psoariasis     Topical steroid    Kenalog injection, before     Arthralgias,     We ruled out celiac and sle in May 2017    I reviewed and summarized records from Donny Rincon MD from 2017 and I reviewed / ordered labs.     No orders of the defined types were placed in this encounter.        A copy of my note was sent to Donny Rincon MD    Please see my above opinion and suggestions.

## 2019-03-07 DIAGNOSIS — E05.90 HYPERTHYROIDISM: Primary | ICD-10-CM

## 2019-03-07 LAB
25(OH)D3 SERPL-MCNC: 27.2 NG/ML (ref 30–100)
T4 FREE SERPL-MCNC: 3.16 NG/DL (ref 0.78–2.19)
TSH SERPL DL<=0.05 MIU/L-ACNC: <0.02 MIU/ML (ref 0.46–4.68)
VIT B12 BLD-MCNC: 592 PG/ML (ref 239–931)

## 2019-03-07 RX ORDER — METHIMAZOLE 10 MG/1
15 TABLET ORAL DAILY
Qty: 45 TABLET | Refills: 2 | Status: SHIPPED | OUTPATIENT
Start: 2019-03-07 | End: 2019-04-24 | Stop reason: SDUPTHER

## 2019-03-08 LAB — T3FREE SERPL-MCNC: 8.5 PG/ML (ref 2–4.4)

## 2019-03-22 ENCOUNTER — RESULTS ENCOUNTER (OUTPATIENT)
Dept: BARIATRICS/WEIGHT MGMT | Facility: CLINIC | Age: 49
End: 2019-03-22

## 2019-03-22 DIAGNOSIS — E66.9 OBESITY, CLASS II, BMI 35-39.9: ICD-10-CM

## 2019-04-19 ENCOUNTER — RESULTS ENCOUNTER (OUTPATIENT)
Dept: BARIATRICS/WEIGHT MGMT | Facility: CLINIC | Age: 49
End: 2019-04-19

## 2019-04-19 DIAGNOSIS — E66.9 OBESITY, CLASS II, BMI 35-39.9: ICD-10-CM

## 2019-04-23 ENCOUNTER — LAB (OUTPATIENT)
Dept: LAB | Facility: HOSPITAL | Age: 49
End: 2019-04-23

## 2019-04-23 DIAGNOSIS — E05.90 HYPERTHYROIDISM: ICD-10-CM

## 2019-04-23 LAB
ALBUMIN SERPL-MCNC: 4 G/DL (ref 3.5–5)
ALBUMIN/GLOB SERPL: 1.3 G/DL (ref 1.1–2.5)
ALP SERPL-CCNC: 148 U/L (ref 24–120)
ALT SERPL W P-5'-P-CCNC: 22 U/L (ref 0–54)
ANION GAP SERPL CALCULATED.3IONS-SCNC: 8 MMOL/L (ref 4–13)
AST SERPL-CCNC: 24 U/L (ref 7–45)
AUTO MIXED CELLS #: 0.5 10*3/MM3 (ref 0.1–2.6)
AUTO MIXED CELLS %: 6.7 % (ref 0.1–24)
BILIRUB SERPL-MCNC: 0.1 MG/DL (ref 0.1–1)
BUN BLD-MCNC: 14 MG/DL (ref 5–21)
BUN/CREAT SERPL: 25.9
CALCIUM SPEC-SCNC: 9.2 MG/DL (ref 8.4–10.4)
CHLORIDE SERPL-SCNC: 104 MMOL/L (ref 98–110)
CO2 SERPL-SCNC: 28 MMOL/L (ref 24–31)
CREAT BLD-MCNC: 0.54 MG/DL (ref 0.5–1.4)
ERYTHROCYTE [DISTWIDTH] IN BLOOD BY AUTOMATED COUNT: 16.9 % (ref 12–15)
GFR SERPL CREATININE-BSD FRML MDRD: 121 ML/MIN/1.73
GLOBULIN UR ELPH-MCNC: 3.2 GM/DL
GLUCOSE BLD-MCNC: 89 MG/DL (ref 70–100)
HCT VFR BLD AUTO: 35.6 % (ref 37–47)
HGB BLD-MCNC: 11.2 G/DL (ref 12–16)
LYMPHOCYTES # BLD AUTO: 1.6 10*3/MM3 (ref 0.7–3.1)
LYMPHOCYTES NFR BLD AUTO: 22.9 % (ref 15–45)
MCH RBC QN AUTO: 25.5 PG (ref 28–32)
MCHC RBC AUTO-ENTMCNC: 31.5 G/DL (ref 33–36)
MCV RBC AUTO: 80.9 FL (ref 82–98)
NEUTROPHILS # BLD AUTO: 5 10*3/MM3 (ref 1.5–8.3)
NEUTROPHILS NFR BLD AUTO: 70.4 % (ref 39–78)
PLATELET # BLD AUTO: 238 10*3/MM3 (ref 130–400)
PMV BLD AUTO: 10.5 FL (ref 6–12)
POTASSIUM BLD-SCNC: 4.3 MMOL/L (ref 3.5–5.3)
PROT SERPL-MCNC: 7.2 G/DL (ref 6.3–8.7)
RBC # BLD AUTO: 4.4 10*6/MM3 (ref 4.2–5.4)
SODIUM BLD-SCNC: 140 MMOL/L (ref 135–145)
T4 FREE SERPL-MCNC: 2.49 NG/DL (ref 0.78–2.19)
TSH SERPL DL<=0.05 MIU/L-ACNC: <0.02 MIU/ML (ref 0.47–4.68)
WBC NRBC COR # BLD: 7.1 10*3/MM3 (ref 4.8–10.8)

## 2019-04-23 PROCEDURE — 36415 COLL VENOUS BLD VENIPUNCTURE: CPT

## 2019-04-23 PROCEDURE — 84439 ASSAY OF FREE THYROXINE: CPT | Performed by: INTERNAL MEDICINE

## 2019-04-23 PROCEDURE — 80050 GENERAL HEALTH PANEL: CPT

## 2019-04-24 DIAGNOSIS — E05.90 HYPERTHYROIDISM: Primary | ICD-10-CM

## 2019-04-24 RX ORDER — METHIMAZOLE 10 MG/1
20 TABLET ORAL DAILY
Qty: 180 TABLET | Refills: 11 | Status: SHIPPED | OUTPATIENT
Start: 2019-04-24 | End: 2019-06-11

## 2019-05-17 ENCOUNTER — RESULTS ENCOUNTER (OUTPATIENT)
Dept: BARIATRICS/WEIGHT MGMT | Facility: CLINIC | Age: 49
End: 2019-05-17

## 2019-05-17 DIAGNOSIS — E66.9 OBESITY, CLASS II, BMI 35-39.9: ICD-10-CM

## 2019-06-11 RX ORDER — METHIMAZOLE 10 MG/1
TABLET ORAL
Qty: 45 TABLET | Refills: 3 | Status: SHIPPED | OUTPATIENT
Start: 2019-06-11 | End: 2019-11-22

## 2019-06-11 RX ORDER — ATENOLOL 25 MG/1
25 TABLET ORAL DAILY
Qty: 30 TABLET | Refills: 3 | Status: SHIPPED | OUTPATIENT
Start: 2019-06-11 | End: 2020-03-05

## 2019-09-05 ENCOUNTER — LAB (OUTPATIENT)
Dept: LAB | Facility: HOSPITAL | Age: 49
End: 2019-09-05

## 2019-09-05 ENCOUNTER — OFFICE VISIT (OUTPATIENT)
Dept: ENDOCRINOLOGY | Facility: CLINIC | Age: 49
End: 2019-09-05

## 2019-09-05 VITALS
WEIGHT: 189.4 LBS | OXYGEN SATURATION: 99 % | HEIGHT: 60 IN | SYSTOLIC BLOOD PRESSURE: 104 MMHG | HEART RATE: 94 BPM | DIASTOLIC BLOOD PRESSURE: 68 MMHG | BODY MASS INDEX: 37.18 KG/M2

## 2019-09-05 DIAGNOSIS — E05.00 GRAVES' DISEASE: ICD-10-CM

## 2019-09-05 DIAGNOSIS — E05.90 HYPERTHYROIDISM: ICD-10-CM

## 2019-09-05 DIAGNOSIS — E55.9 VITAMIN D DEFICIENCY: ICD-10-CM

## 2019-09-05 DIAGNOSIS — E05.90 HYPERTHYROIDISM: Primary | ICD-10-CM

## 2019-09-05 DIAGNOSIS — E53.8 B12 DEFICIENCY: ICD-10-CM

## 2019-09-05 LAB
ALBUMIN SERPL-MCNC: 4.1 G/DL (ref 3.5–5)
ALBUMIN/GLOB SERPL: 1.4 G/DL (ref 1.1–2.5)
ALP SERPL-CCNC: 218 U/L (ref 24–120)
ALT SERPL W P-5'-P-CCNC: 63 U/L (ref 0–54)
ANION GAP SERPL CALCULATED.3IONS-SCNC: 7 MMOL/L (ref 4–13)
AST SERPL-CCNC: 59 U/L (ref 7–45)
BASOPHILS # BLD AUTO: 0.02 10*3/MM3 (ref 0–0.2)
BASOPHILS NFR BLD AUTO: 0.3 % (ref 0–1.5)
BILIRUB SERPL-MCNC: 0.4 MG/DL (ref 0.1–1)
BUN BLD-MCNC: 11 MG/DL (ref 5–21)
BUN/CREAT SERPL: 18 (ref 7–25)
CALCIUM SPEC-SCNC: 9.6 MG/DL (ref 8.4–10.4)
CHLORIDE SERPL-SCNC: 104 MMOL/L (ref 98–110)
CO2 SERPL-SCNC: 28 MMOL/L (ref 24–31)
CREAT BLD-MCNC: 0.61 MG/DL (ref 0.5–1.4)
DEPRECATED RDW RBC AUTO: 41.8 FL (ref 37–54)
EOSINOPHIL # BLD AUTO: 0.05 10*3/MM3 (ref 0–0.4)
EOSINOPHIL NFR BLD AUTO: 0.8 % (ref 0.3–6.2)
ERYTHROCYTE [DISTWIDTH] IN BLOOD BY AUTOMATED COUNT: 13.5 % (ref 12.3–15.4)
GFR SERPL CREATININE-BSD FRML MDRD: 105 ML/MIN/1.73
GLOBULIN UR ELPH-MCNC: 3 GM/DL
GLUCOSE BLD-MCNC: 94 MG/DL (ref 70–100)
HCT VFR BLD AUTO: 35.1 % (ref 34–46.6)
HGB BLD-MCNC: 11.9 G/DL (ref 12–15.9)
IMM GRANULOCYTES # BLD AUTO: 0.01 10*3/MM3 (ref 0–0.05)
IMM GRANULOCYTES NFR BLD AUTO: 0.2 % (ref 0–0.5)
LYMPHOCYTES # BLD AUTO: 1.32 10*3/MM3 (ref 0.7–3.1)
LYMPHOCYTES NFR BLD AUTO: 20.6 % (ref 19.6–45.3)
MCH RBC QN AUTO: 28.7 PG (ref 26.6–33)
MCHC RBC AUTO-ENTMCNC: 33.9 G/DL (ref 31.5–35.7)
MCV RBC AUTO: 84.8 FL (ref 79–97)
MONOCYTES # BLD AUTO: 0.46 10*3/MM3 (ref 0.1–0.9)
MONOCYTES NFR BLD AUTO: 7.2 % (ref 5–12)
NEUTROPHILS # BLD AUTO: 4.55 10*3/MM3 (ref 1.7–7)
NEUTROPHILS NFR BLD AUTO: 70.9 % (ref 42.7–76)
NRBC BLD AUTO-RTO: 0 /100 WBC (ref 0–0.2)
PLATELET # BLD AUTO: 237 10*3/MM3 (ref 140–450)
PMV BLD AUTO: 11.2 FL (ref 6–12)
POTASSIUM BLD-SCNC: 4.1 MMOL/L (ref 3.5–5.3)
PROT SERPL-MCNC: 7.1 G/DL (ref 6.3–8.7)
RBC # BLD AUTO: 4.14 10*6/MM3 (ref 3.77–5.28)
SODIUM BLD-SCNC: 139 MMOL/L (ref 135–145)
T4 FREE SERPL-MCNC: 3.5 NG/DL (ref 0.78–2.19)
TSH SERPL DL<=0.05 MIU/L-ACNC: <0.02 UIU/ML (ref 0.47–4.68)
WBC NRBC COR # BLD: 6.41 10*3/MM3 (ref 3.4–10.8)

## 2019-09-05 PROCEDURE — 80050 GENERAL HEALTH PANEL: CPT | Performed by: INTERNAL MEDICINE

## 2019-09-05 PROCEDURE — 84439 ASSAY OF FREE THYROXINE: CPT | Performed by: INTERNAL MEDICINE

## 2019-09-05 PROCEDURE — 84481 FREE ASSAY (FT-3): CPT | Performed by: INTERNAL MEDICINE

## 2019-09-05 PROCEDURE — 99214 OFFICE O/P EST MOD 30 MIN: CPT | Performed by: INTERNAL MEDICINE

## 2019-09-05 PROCEDURE — 36415 COLL VENOUS BLD VENIPUNCTURE: CPT | Performed by: INTERNAL MEDICINE

## 2019-09-05 NOTE — PROGRESS NOTES
Nicolas Amaro is a 49 y.o. female who presents for  evaluation of   Chief Complaint   Patient presents with   • Graves' Disease       Referring provider  Primary Care Provider    Donny Rincon MD    Duration 10 years    Timing - Graves' Disease is constant    Started as Hypothyroidism , transitioned to hyperthyroidism     Quality -  poorly controlled noncompliant with methimazole     Severity -  moderate    Complications - Thyroid storm in the past    Current symptoms/problems  see ROS        Past Medical History:   Diagnosis Date   • Anxiety    • Autoimmune thyroiditis    • Fatigue    • Graves disease    • Graves disease    • Graves disease    • Hashimoto's thyroiditis    • History of cold-induced urticaria    • Migraines    • Multiple exostosis, hereditary    • Osteochondral defect      Family History   Problem Relation Age of Onset   • Kidney disease Father    • Diabetes Son    • Heart disease Maternal Grandmother    • Cancer Paternal Grandfather    • Breast cancer Neg Hx    • Ovarian cancer Neg Hx    • Uterine cancer Neg Hx    • Colon cancer Neg Hx      Social History     Tobacco Use   • Smoking status: Never Smoker   • Smokeless tobacco: Never Used   Substance Use Topics   • Alcohol use: No   • Drug use: No         Current Outpatient Medications:   •  atenolol (TENORMIN) 25 MG tablet, TAKE 1 TABLET BY MOUTH DAILY, Disp: 30 tablet, Rfl: 3  •  Cholecalciferol 17777 units tablet, 50 thousand units po q 2 weeks, Disp: 2 tablet, Rfl: 11  •  methIMAzole (TAPAZOLE) 10 MG tablet, TAKE 1 AND 1/2 TABLETS BY MOUTH DAILY, Disp: 45 tablet, Rfl: 3  •  brompheniramine-pseudoephedrine-DM 30-2-10 MG/5ML syrup, Take 5 mL by mouth 4 (Four) Times a Day As Needed for Congestion or Cough., Disp: 118 mL, Rfl: 0    Review of Systems    Review of Systems   Constitutional: Positive for unexpected weight change. Negative for activity change, appetite change, chills, diaphoresis, fatigue and fever.   HENT: Negative for congestion,  "dental problem, drooling, ear discharge, ear pain, facial swelling, mouth sores, postnasal drip, rhinorrhea, sinus pressure, sore throat, tinnitus, trouble swallowing and voice change.    Eyes: Negative for photophobia, pain, discharge, redness, itching and visual disturbance.   Respiratory: Negative for apnea, cough, choking, chest tightness, shortness of breath, wheezing and stridor.    Cardiovascular: Negative for chest pain, palpitations and leg swelling.   Gastrointestinal: Negative for abdominal distention, abdominal pain, constipation, diarrhea, nausea and vomiting.   Endocrine: Positive for polyuria. Negative for cold intolerance, heat intolerance, polydipsia and polyphagia.   Genitourinary: Negative for decreased urine volume, difficulty urinating, dysuria, flank pain, frequency, hematuria, menstrual problem and urgency.   Musculoskeletal: Negative for arthralgias, back pain, gait problem, joint swelling, myalgias, neck pain and neck stiffness.   Skin: Negative for color change, pallor, rash and wound.   Allergic/Immunologic: Negative for immunocompromised state.   Neurological: Negative for dizziness, seizures, syncope, facial asymmetry, speech difficulty, weakness, light-headedness, numbness and headaches.   Hematological: Negative for adenopathy.   Psychiatric/Behavioral: Negative for agitation, behavioral problems, confusion, decreased concentration, dysphoric mood, hallucinations, self-injury, sleep disturbance and suicidal ideas. The patient is not nervous/anxious and is not hyperactive.         Objective:   /68   Pulse 94   Ht 151.6 cm (59.7\")   Wt 85.9 kg (189 lb 6.4 oz)   SpO2 99%   BMI 37.36 kg/m²     Physical Exam   Constitutional: She is oriented to person, place, and time. She appears well-developed.   HENT:   Head: Normocephalic.   Right Ear: External ear normal.   Left Ear: External ear normal.   Nose: Nose normal.   Eyes: Conjunctivae and EOM are normal. No scleral icterus. "   Neck: Normal range of motion. Neck supple. No tracheal deviation present. No thyromegaly present.   Cardiovascular: Normal rate, regular rhythm, normal heart sounds and intact distal pulses. Exam reveals no gallop and no friction rub.   No murmur heard.  Pulmonary/Chest: Effort normal and breath sounds normal. No stridor. No respiratory distress. She has no wheezes. She has no rales. She exhibits no tenderness.   Abdominal: Soft. Bowel sounds are normal. She exhibits no distension and no mass. There is no tenderness. There is no rebound and no guarding.   Musculoskeletal: Normal range of motion. She exhibits no tenderness or deformity.   Lymphadenopathy:     She has no cervical adenopathy.   Neurological: She is alert and oriented to person, place, and time. She displays normal reflexes. She exhibits normal muscle tone. Coordination normal.   Skin: No rash noted. No erythema. No pallor.   Psoriatic plaques feet    Psychiatric: She has a normal mood and affect. Her behavior is normal. Judgment and thought content normal.       Lab Review    Results for orders placed or performed in visit on 04/24/19   TSH   Result Value Ref Range    TSH <0.020 (L) 0.470 - 4.680 uIU/mL   T4, Free   Result Value Ref Range    Free T4 3.50 (H) 0.78 - 2.19 ng/dL   Comprehensive Metabolic Panel   Result Value Ref Range    Glucose 94 70 - 100 mg/dL    BUN 11 5 - 21 mg/dL    Creatinine 0.61 0.50 - 1.40 mg/dL    Sodium 139 135 - 145 mmol/L    Potassium 4.1 3.5 - 5.3 mmol/L    Chloride 104 98 - 110 mmol/L    CO2 28.0 24.0 - 31.0 mmol/L    Calcium 9.6 8.4 - 10.4 mg/dL    Total Protein 7.1 6.3 - 8.7 g/dL    Albumin 4.10 3.50 - 5.00 g/dL    ALT (SGPT) 63 (H) 0 - 54 U/L    AST (SGOT) 59 (H) 7 - 45 U/L    Alkaline Phosphatase 218 (H) 24 - 120 U/L    Total Bilirubin 0.4 0.1 - 1.0 mg/dL    eGFR Non African Amer 105 >60 mL/min/1.73    Globulin 3.0 gm/dL    A/G Ratio 1.4 1.1 - 2.5 g/dL    BUN/Creatinine Ratio 18.0 7.0 - 25.0    Anion Gap 7.0 4.0 -  13.0 mmol/L   CBC Auto Differential   Result Value Ref Range    WBC 6.41 3.40 - 10.80 10*3/mm3    RBC 4.14 3.77 - 5.28 10*6/mm3    Hemoglobin 11.9 (L) 12.0 - 15.9 g/dL    Hematocrit 35.1 34.0 - 46.6 %    MCV 84.8 79.0 - 97.0 fL    MCH 28.7 26.6 - 33.0 pg    MCHC 33.9 31.5 - 35.7 g/dL    RDW 13.5 12.3 - 15.4 %    RDW-SD 41.8 37.0 - 54.0 fl    MPV 11.2 6.0 - 12.0 fL    Platelets 237 140 - 450 10*3/mm3    Neutrophil % 70.9 42.7 - 76.0 %    Lymphocyte % 20.6 19.6 - 45.3 %    Monocyte % 7.2 5.0 - 12.0 %    Eosinophil % 0.8 0.3 - 6.2 %    Basophil % 0.3 0.0 - 1.5 %    Immature Grans % 0.2 0.0 - 0.5 %    Neutrophils, Absolute 4.55 1.70 - 7.00 10*3/mm3    Lymphocytes, Absolute 1.32 0.70 - 3.10 10*3/mm3    Monocytes, Absolute 0.46 0.10 - 0.90 10*3/mm3    Eosinophils, Absolute 0.05 0.00 - 0.40 10*3/mm3    Basophils, Absolute 0.02 0.00 - 0.20 10*3/mm3    Immature Grans, Absolute 0.01 0.00 - 0.05 10*3/mm3    nRBC 0.0 0.0 - 0.2 /100 WBC           Assessment/Plan       ICD-10-CM ICD-9-CM   1. Hyperthyroidism E05.90 242.90   2. Graves' disease E05.00 242.00   3. B12 deficiency E53.8 266.2   4. Vitamin D deficiency E55.9 268.9       Lab Results   Component Value Date    TSH <0.020 (L) 09/05/2019    TSH <0.020 (L) 04/23/2019    TSH <0.020 (L) 03/06/2019       Lab Results   Component Value Date    FREET4 3.50 (H) 09/05/2019    FREET4 2.49 (H) 04/23/2019    FREET4 3.16 (H) 03/06/2019       Lab Results   Component Value Date    B4MLXEW 96 12/15/2017    D1HAPAF 92 06/21/2017    T6NLLGO 416.0 (H) 05/05/2017       Thyroid Antibodies  TPO AB  Lab Results   Component Value Date    THYROIDAB 104 (H) 12/15/2017       Thyroid Stimulating Immunoglobulin    Lab Results   Component Value Date    LABTHYR 262 (H) 12/15/2017         Methimazole 10 mg tabs,    Consistently take 1.5 tabs daily     Check levels today and labs every 2 months and appt every 6 months     atenolol 25 mg daily - may use PRN for palpitations        Assess      psoariasis      Topical steroid    Kenalog injection, before     Arthralgias,     We ruled out celiac and sle in May 2017    I reviewed and summarized records from Donny Rincon MD from 2019 and I reviewed / ordered labs.     No orders of the defined types were placed in this encounter.        A copy of my note was sent to Donny Rincon MD    Please see my above opinion and suggestions.

## 2019-09-06 LAB — T3FREE SERPL-MCNC: 8.8 PG/ML (ref 2–4.4)

## 2019-10-18 ENCOUNTER — LAB (OUTPATIENT)
Dept: LAB | Facility: HOSPITAL | Age: 49
End: 2019-10-18

## 2019-10-18 DIAGNOSIS — E05.00 GRAVES' DISEASE: ICD-10-CM

## 2019-10-18 DIAGNOSIS — E05.90 HYPERTHYROIDISM: ICD-10-CM

## 2019-10-18 LAB
ALBUMIN SERPL-MCNC: 4.1 G/DL (ref 3.5–5)
ALBUMIN/GLOB SERPL: 1.2 G/DL (ref 1.1–2.5)
ALP SERPL-CCNC: 136 U/L (ref 24–120)
ALT SERPL W P-5'-P-CCNC: 27 U/L (ref 0–54)
ANION GAP SERPL CALCULATED.3IONS-SCNC: 4 MMOL/L (ref 4–13)
AST SERPL-CCNC: 26 U/L (ref 7–45)
AUTO MIXED CELLS #: 0.5 10*3/MM3 (ref 0.1–2.6)
AUTO MIXED CELLS %: 6.3 % (ref 0.1–24)
BILIRUB SERPL-MCNC: 0.3 MG/DL (ref 0.1–1)
BUN BLD-MCNC: 11 MG/DL (ref 5–21)
BUN/CREAT SERPL: 17.5
CALCIUM SPEC-SCNC: 8.8 MG/DL (ref 8.4–10.4)
CHLORIDE SERPL-SCNC: 105 MMOL/L (ref 98–110)
CO2 SERPL-SCNC: 32 MMOL/L (ref 24–31)
CREAT BLD-MCNC: 0.63 MG/DL (ref 0.5–1.4)
ERYTHROCYTE [DISTWIDTH] IN BLOOD BY AUTOMATED COUNT: 13.7 % (ref 12.3–15.4)
GFR SERPL CREATININE-BSD FRML MDRD: 101 ML/MIN/1.73
GLOBULIN UR ELPH-MCNC: 3.5 GM/DL
GLUCOSE BLD-MCNC: 93 MG/DL (ref 70–100)
HCT VFR BLD AUTO: 37.6 % (ref 34–46.6)
HGB BLD-MCNC: 12.2 G/DL (ref 12–15.9)
LYMPHOCYTES # BLD AUTO: 1.7 10*3/MM3 (ref 0.7–3.1)
LYMPHOCYTES NFR BLD AUTO: 23.5 % (ref 19.6–45.3)
MCH RBC QN AUTO: 27.8 PG (ref 26.6–33)
MCHC RBC AUTO-ENTMCNC: 32.4 G/DL (ref 31.5–35.7)
MCV RBC AUTO: 85.6 FL (ref 79–97)
NEUTROPHILS # BLD AUTO: 5.2 10*3/MM3 (ref 1.7–7)
NEUTROPHILS NFR BLD AUTO: 70.2 % (ref 42.7–76)
PLATELET # BLD AUTO: 198 10*3/MM3 (ref 140–450)
PMV BLD AUTO: 10.8 FL (ref 6–12)
POTASSIUM BLD-SCNC: 4.2 MMOL/L (ref 3.5–5.3)
PROT SERPL-MCNC: 7.6 G/DL (ref 6.3–8.7)
RBC # BLD AUTO: 4.39 10*6/MM3 (ref 3.77–5.28)
SODIUM BLD-SCNC: 141 MMOL/L (ref 135–145)
WBC NRBC COR # BLD: 7.4 10*3/MM3 (ref 3.4–10.8)

## 2019-10-18 PROCEDURE — 84439 ASSAY OF FREE THYROXINE: CPT | Performed by: INTERNAL MEDICINE

## 2019-10-18 PROCEDURE — 80050 GENERAL HEALTH PANEL: CPT

## 2019-10-18 PROCEDURE — 36415 COLL VENOUS BLD VENIPUNCTURE: CPT

## 2019-10-19 LAB
T4 FREE SERPL-MCNC: 0.8 NG/DL (ref 0.93–1.7)
TSH SERPL DL<=0.05 MIU/L-ACNC: 0.12 UIU/ML (ref 0.27–4.2)

## 2019-11-20 ENCOUNTER — LAB (OUTPATIENT)
Dept: LAB | Facility: HOSPITAL | Age: 49
End: 2019-11-20

## 2019-11-20 DIAGNOSIS — E05.00 GRAVES' DISEASE: ICD-10-CM

## 2019-11-20 DIAGNOSIS — E05.90 HYPERTHYROIDISM: ICD-10-CM

## 2019-11-20 LAB
ALBUMIN SERPL-MCNC: 3.9 G/DL (ref 3.5–5)
ALBUMIN/GLOB SERPL: 1.3 G/DL (ref 1.1–2.5)
ALP SERPL-CCNC: 122 U/L (ref 24–120)
ALT SERPL W P-5'-P-CCNC: 20 U/L (ref 0–54)
ANION GAP SERPL CALCULATED.3IONS-SCNC: 6 MMOL/L (ref 4–13)
AST SERPL-CCNC: 20 U/L (ref 7–45)
AUTO MIXED CELLS #: 0.5 10*3/MM3 (ref 0.1–2.6)
AUTO MIXED CELLS %: 6.1 % (ref 0.1–24)
BILIRUB SERPL-MCNC: 0.2 MG/DL (ref 0.1–1)
BUN BLD-MCNC: 14 MG/DL (ref 5–21)
BUN/CREAT SERPL: 14.7
CALCIUM SPEC-SCNC: 8.5 MG/DL (ref 8.4–10.4)
CHLORIDE SERPL-SCNC: 106 MMOL/L (ref 98–110)
CO2 SERPL-SCNC: 31 MMOL/L (ref 24–31)
CREAT BLD-MCNC: 0.95 MG/DL (ref 0.5–1.4)
ERYTHROCYTE [DISTWIDTH] IN BLOOD BY AUTOMATED COUNT: 14.8 % (ref 12.3–15.4)
GFR SERPL CREATININE-BSD FRML MDRD: 63 ML/MIN/1.73
GLOBULIN UR ELPH-MCNC: 3.1 GM/DL
GLUCOSE BLD-MCNC: 93 MG/DL (ref 70–100)
HCT VFR BLD AUTO: 35.3 % (ref 34–46.6)
HGB BLD-MCNC: 11.5 G/DL (ref 12–15.9)
LYMPHOCYTES # BLD AUTO: 2 10*3/MM3 (ref 0.7–3.1)
LYMPHOCYTES NFR BLD AUTO: 25.9 % (ref 19.6–45.3)
MCH RBC QN AUTO: 28.8 PG (ref 26.6–33)
MCHC RBC AUTO-ENTMCNC: 32.6 G/DL (ref 31.5–35.7)
MCV RBC AUTO: 88.3 FL (ref 79–97)
NEUTROPHILS # BLD AUTO: 5.1 10*3/MM3 (ref 1.7–7)
NEUTROPHILS NFR BLD AUTO: 68 % (ref 42.7–76)
PLATELET # BLD AUTO: 198 10*3/MM3 (ref 140–450)
PMV BLD AUTO: 10.7 FL (ref 6–12)
POTASSIUM BLD-SCNC: 4.1 MMOL/L (ref 3.5–5.3)
PROT SERPL-MCNC: 7 G/DL (ref 6.3–8.7)
RBC # BLD AUTO: 4 10*6/MM3 (ref 3.77–5.28)
SODIUM BLD-SCNC: 143 MMOL/L (ref 135–145)
WBC NRBC COR # BLD: 7.6 10*3/MM3 (ref 3.4–10.8)

## 2019-11-20 PROCEDURE — 85025 COMPLETE CBC W/AUTO DIFF WBC: CPT

## 2019-11-20 PROCEDURE — 84439 ASSAY OF FREE THYROXINE: CPT | Performed by: INTERNAL MEDICINE

## 2019-11-20 PROCEDURE — 80053 COMPREHEN METABOLIC PANEL: CPT

## 2019-11-20 PROCEDURE — 80050 GENERAL HEALTH PANEL: CPT

## 2019-11-20 PROCEDURE — 36415 COLL VENOUS BLD VENIPUNCTURE: CPT

## 2019-11-21 LAB
T4 FREE SERPL-MCNC: 0.81 NG/DL (ref 0.93–1.7)
TSH SERPL DL<=0.05 MIU/L-ACNC: 6.46 UIU/ML (ref 0.27–4.2)

## 2019-11-22 RX ORDER — METHIMAZOLE 5 MG/1
7.5 TABLET ORAL 3 TIMES DAILY
Qty: 45 TABLET | Refills: 11 | Status: SHIPPED | OUTPATIENT
Start: 2019-11-22 | End: 2020-11-21

## 2020-01-03 ENCOUNTER — LAB (OUTPATIENT)
Dept: LAB | Facility: HOSPITAL | Age: 50
End: 2020-01-03

## 2020-01-03 DIAGNOSIS — E05.00 GRAVES' DISEASE: ICD-10-CM

## 2020-01-03 LAB
ALBUMIN SERPL-MCNC: 4.2 G/DL (ref 3.5–5)
ALBUMIN/GLOB SERPL: 1.1 G/DL (ref 1.1–2.5)
ALP SERPL-CCNC: 132 U/L (ref 24–120)
ALT SERPL W P-5'-P-CCNC: 18 U/L (ref 0–54)
ANION GAP SERPL CALCULATED.3IONS-SCNC: 8 MMOL/L (ref 4–13)
AST SERPL-CCNC: 21 U/L (ref 7–45)
AUTO MIXED CELLS #: 0.5 10*3/MM3 (ref 0.1–2.6)
AUTO MIXED CELLS %: 6.5 % (ref 0.1–24)
BILIRUB SERPL-MCNC: 0.4 MG/DL (ref 0.1–1)
BUN BLD-MCNC: 15 MG/DL (ref 5–21)
BUN/CREAT SERPL: 19
CALCIUM SPEC-SCNC: 9.8 MG/DL (ref 8.4–10.4)
CHLORIDE SERPL-SCNC: 102 MMOL/L (ref 98–110)
CO2 SERPL-SCNC: 29 MMOL/L (ref 24–31)
CREAT BLD-MCNC: 0.79 MG/DL (ref 0.5–1.4)
ERYTHROCYTE [DISTWIDTH] IN BLOOD BY AUTOMATED COUNT: 14 % (ref 12.3–15.4)
GFR SERPL CREATININE-BSD FRML MDRD: 78 ML/MIN/1.73
GLOBULIN UR ELPH-MCNC: 3.8 GM/DL
GLUCOSE BLD-MCNC: 96 MG/DL (ref 70–100)
HCT VFR BLD AUTO: 36.4 % (ref 34–46.6)
HGB BLD-MCNC: 12 G/DL (ref 12–15.9)
LYMPHOCYTES # BLD AUTO: 2 10*3/MM3 (ref 0.7–3.1)
LYMPHOCYTES NFR BLD AUTO: 26.2 % (ref 19.6–45.3)
MCH RBC QN AUTO: 29 PG (ref 26.6–33)
MCHC RBC AUTO-ENTMCNC: 33 G/DL (ref 31.5–35.7)
MCV RBC AUTO: 87.9 FL (ref 79–97)
NEUTROPHILS # BLD AUTO: 5.1 10*3/MM3 (ref 1.7–7)
NEUTROPHILS NFR BLD AUTO: 67.3 % (ref 42.7–76)
PLATELET # BLD AUTO: 219 10*3/MM3 (ref 140–450)
PMV BLD AUTO: 10.6 FL (ref 6–12)
POTASSIUM BLD-SCNC: 4.4 MMOL/L (ref 3.5–5.3)
PROT SERPL-MCNC: 8 G/DL (ref 6.3–8.7)
RBC # BLD AUTO: 4.14 10*6/MM3 (ref 3.77–5.28)
SODIUM BLD-SCNC: 139 MMOL/L (ref 135–145)
WBC NRBC COR # BLD: 7.6 10*3/MM3 (ref 3.4–10.8)

## 2020-01-03 PROCEDURE — 36415 COLL VENOUS BLD VENIPUNCTURE: CPT

## 2020-01-03 PROCEDURE — 85025 COMPLETE CBC W/AUTO DIFF WBC: CPT

## 2020-01-03 PROCEDURE — 80053 COMPREHEN METABOLIC PANEL: CPT

## 2020-01-03 PROCEDURE — 84439 ASSAY OF FREE THYROXINE: CPT | Performed by: INTERNAL MEDICINE

## 2020-01-03 PROCEDURE — 80050 GENERAL HEALTH PANEL: CPT

## 2020-01-04 LAB
T4 FREE SERPL-MCNC: 1.11 NG/DL (ref 0.93–1.7)
TSH SERPL DL<=0.05 MIU/L-ACNC: 2.13 UIU/ML (ref 0.27–4.2)

## 2020-02-05 ENCOUNTER — LAB (OUTPATIENT)
Dept: LAB | Facility: HOSPITAL | Age: 50
End: 2020-02-05

## 2020-02-05 DIAGNOSIS — E05.00 GRAVES' DISEASE: ICD-10-CM

## 2020-02-05 PROCEDURE — 84439 ASSAY OF FREE THYROXINE: CPT | Performed by: INTERNAL MEDICINE

## 2020-02-05 PROCEDURE — 36415 COLL VENOUS BLD VENIPUNCTURE: CPT

## 2020-02-05 PROCEDURE — 84466 ASSAY OF TRANSFERRIN: CPT | Performed by: INTERNAL MEDICINE

## 2020-02-05 PROCEDURE — 80050 GENERAL HEALTH PANEL: CPT | Performed by: INTERNAL MEDICINE

## 2020-02-05 PROCEDURE — 83540 ASSAY OF IRON: CPT | Performed by: INTERNAL MEDICINE

## 2020-02-06 LAB
ALBUMIN SERPL-MCNC: 4.2 G/DL (ref 3.5–5.2)
ALBUMIN/GLOB SERPL: 1.6 G/DL
ALP SERPL-CCNC: 142 U/L (ref 39–117)
ALT SERPL W P-5'-P-CCNC: 19 U/L (ref 1–33)
ANION GAP SERPL CALCULATED.3IONS-SCNC: 10.3 MMOL/L (ref 5–15)
AST SERPL-CCNC: 21 U/L (ref 1–32)
BASOPHILS # BLD AUTO: 0.04 10*3/MM3 (ref 0–0.2)
BASOPHILS NFR BLD AUTO: 0.5 % (ref 0–1.5)
BILIRUB SERPL-MCNC: <0.2 MG/DL (ref 0.2–1.2)
BUN BLD-MCNC: 15 MG/DL (ref 6–20)
BUN/CREAT SERPL: 17.4 (ref 7–25)
CALCIUM SPEC-SCNC: 9.1 MG/DL (ref 8.6–10.5)
CHLORIDE SERPL-SCNC: 102 MMOL/L (ref 98–107)
CO2 SERPL-SCNC: 26.7 MMOL/L (ref 22–29)
CREAT BLD-MCNC: 0.86 MG/DL (ref 0.57–1)
DEPRECATED RDW RBC AUTO: 45.4 FL (ref 37–54)
EOSINOPHIL # BLD AUTO: 0.12 10*3/MM3 (ref 0–0.4)
EOSINOPHIL NFR BLD AUTO: 1.4 % (ref 0.3–6.2)
ERYTHROCYTE [DISTWIDTH] IN BLOOD BY AUTOMATED COUNT: 14.1 % (ref 12.3–15.4)
GFR SERPL CREATININE-BSD FRML MDRD: 70 ML/MIN/1.73
GLOBULIN UR ELPH-MCNC: 2.7 GM/DL
GLUCOSE BLD-MCNC: 97 MG/DL (ref 65–99)
HCT VFR BLD AUTO: 31.9 % (ref 34–46.6)
HGB BLD-MCNC: 10.2 G/DL (ref 12–15.9)
IMM GRANULOCYTES # BLD AUTO: 0.03 10*3/MM3 (ref 0–0.05)
IMM GRANULOCYTES NFR BLD AUTO: 0.3 % (ref 0–0.5)
LYMPHOCYTES # BLD AUTO: 2.46 10*3/MM3 (ref 0.7–3.1)
LYMPHOCYTES NFR BLD AUTO: 28.6 % (ref 19.6–45.3)
MCH RBC QN AUTO: 27.9 PG (ref 26.6–33)
MCHC RBC AUTO-ENTMCNC: 32 G/DL (ref 31.5–35.7)
MCV RBC AUTO: 87.4 FL (ref 79–97)
MONOCYTES # BLD AUTO: 0.58 10*3/MM3 (ref 0.1–0.9)
MONOCYTES NFR BLD AUTO: 6.7 % (ref 5–12)
NEUTROPHILS # BLD AUTO: 5.38 10*3/MM3 (ref 1.7–7)
NEUTROPHILS NFR BLD AUTO: 62.5 % (ref 42.7–76)
NRBC BLD AUTO-RTO: 0 /100 WBC (ref 0–0.2)
PLATELET # BLD AUTO: 300 10*3/MM3 (ref 140–450)
PMV BLD AUTO: 11.9 FL (ref 6–12)
POTASSIUM BLD-SCNC: 4.2 MMOL/L (ref 3.5–5.2)
PROT SERPL-MCNC: 6.9 G/DL (ref 6–8.5)
RBC # BLD AUTO: 3.65 10*6/MM3 (ref 3.77–5.28)
SODIUM BLD-SCNC: 139 MMOL/L (ref 136–145)
T4 FREE SERPL-MCNC: 0.96 NG/DL (ref 0.93–1.7)
TSH SERPL DL<=0.05 MIU/L-ACNC: 5.58 UIU/ML (ref 0.27–4.2)
WBC NRBC COR # BLD: 8.61 10*3/MM3 (ref 3.4–10.8)

## 2020-02-08 DIAGNOSIS — D64.9 ANEMIA, UNSPECIFIED TYPE: ICD-10-CM

## 2020-02-08 DIAGNOSIS — E66.09 CLASS 1 OBESITY DUE TO EXCESS CALORIES WITHOUT SERIOUS COMORBIDITY WITH BODY MASS INDEX (BMI) OF 30.0 TO 30.9 IN ADULT: Primary | ICD-10-CM

## 2020-02-08 DIAGNOSIS — E05.90 HYPERTHYROIDISM: Primary | ICD-10-CM

## 2020-02-08 LAB
IRON 24H UR-MRATE: 20 MCG/DL (ref 37–145)
IRON SATN MFR SERPL: 4 % (ref 20–50)
TIBC SERPL-MCNC: 456 MCG/DL (ref 298–536)
TRANSFERRIN SERPL-MCNC: 306 MG/DL (ref 200–360)

## 2020-02-08 RX ORDER — PHENTERMINE HYDROCHLORIDE 15 MG/1
15 CAPSULE ORAL EVERY MORNING
Qty: 30 CAPSULE | Refills: 3 | Status: SHIPPED | OUTPATIENT
Start: 2020-02-08 | End: 2020-03-05

## 2020-02-08 RX ORDER — PHENTERMINE HYDROCHLORIDE 15 MG/1
15 CAPSULE ORAL EVERY MORNING
Qty: 30 CAPSULE | Refills: 3 | Status: CANCELLED | OUTPATIENT
Start: 2020-02-08

## 2020-02-10 RX ORDER — MULTIVIT WITH MINERALS/LUTEIN
TABLET ORAL
Qty: 60 TABLET | Refills: 11 | Status: SHIPPED | OUTPATIENT
Start: 2020-02-10 | End: 2021-01-11

## 2020-02-10 RX ORDER — CYANOCOBALAMIN 1000 UG/ML
1000 INJECTION, SOLUTION INTRAMUSCULAR; SUBCUTANEOUS
Qty: 1 ML | Refills: 11 | Status: SHIPPED | OUTPATIENT
Start: 2020-02-10 | End: 2021-01-11

## 2020-02-10 RX ORDER — FERROUS SULFATE 325(65) MG
TABLET ORAL
Qty: 30 TABLET | Refills: 3 | Status: SHIPPED | OUTPATIENT
Start: 2020-02-10 | End: 2021-01-11

## 2020-03-02 ENCOUNTER — APPOINTMENT (OUTPATIENT)
Dept: LAB | Facility: HOSPITAL | Age: 50
End: 2020-03-02

## 2020-03-02 LAB
ALBUMIN SERPL-MCNC: 4.5 G/DL (ref 3.5–5.2)
ALBUMIN/GLOB SERPL: 1.5 G/DL
ALP SERPL-CCNC: 114 U/L (ref 39–117)
ALT SERPL W P-5'-P-CCNC: 13 U/L (ref 1–33)
ANION GAP SERPL CALCULATED.3IONS-SCNC: 12 MMOL/L (ref 5–15)
AST SERPL-CCNC: 16 U/L (ref 1–32)
BASOPHILS # BLD AUTO: 0.03 10*3/MM3 (ref 0–0.2)
BASOPHILS NFR BLD AUTO: 0.4 % (ref 0–1.5)
BILIRUB SERPL-MCNC: 0.2 MG/DL (ref 0.2–1.2)
BUN BLD-MCNC: 11 MG/DL (ref 6–20)
BUN/CREAT SERPL: 17.2 (ref 7–25)
CALCIUM SPEC-SCNC: 9.4 MG/DL (ref 8.6–10.5)
CHLORIDE SERPL-SCNC: 105 MMOL/L (ref 98–107)
CO2 SERPL-SCNC: 26 MMOL/L (ref 22–29)
CREAT BLD-MCNC: 0.64 MG/DL (ref 0.57–1)
DEPRECATED RDW RBC AUTO: 49.2 FL (ref 37–54)
EOSINOPHIL # BLD AUTO: 0.07 10*3/MM3 (ref 0–0.4)
EOSINOPHIL NFR BLD AUTO: 1 % (ref 0.3–6.2)
ERYTHROCYTE [DISTWIDTH] IN BLOOD BY AUTOMATED COUNT: 15.7 % (ref 12.3–15.4)
GFR SERPL CREATININE-BSD FRML MDRD: 99 ML/MIN/1.73
GLOBULIN UR ELPH-MCNC: 3 GM/DL
GLUCOSE BLD-MCNC: 93 MG/DL (ref 65–99)
HCT VFR BLD AUTO: 37.2 % (ref 34–46.6)
HGB BLD-MCNC: 12 G/DL (ref 12–15.9)
IMM GRANULOCYTES # BLD AUTO: 0.01 10*3/MM3 (ref 0–0.05)
IMM GRANULOCYTES NFR BLD AUTO: 0.1 % (ref 0–0.5)
LYMPHOCYTES # BLD AUTO: 1.94 10*3/MM3 (ref 0.7–3.1)
LYMPHOCYTES NFR BLD AUTO: 27.2 % (ref 19.6–45.3)
MCH RBC QN AUTO: 28.2 PG (ref 26.6–33)
MCHC RBC AUTO-ENTMCNC: 32.3 G/DL (ref 31.5–35.7)
MCV RBC AUTO: 87.3 FL (ref 79–97)
MONOCYTES # BLD AUTO: 0.38 10*3/MM3 (ref 0.1–0.9)
MONOCYTES NFR BLD AUTO: 5.3 % (ref 5–12)
NEUTROPHILS # BLD AUTO: 4.71 10*3/MM3 (ref 1.7–7)
NEUTROPHILS NFR BLD AUTO: 66 % (ref 42.7–76)
NRBC BLD AUTO-RTO: 0 /100 WBC (ref 0–0.2)
PLATELET # BLD AUTO: 252 10*3/MM3 (ref 140–450)
PMV BLD AUTO: 11.2 FL (ref 6–12)
POTASSIUM BLD-SCNC: 4.3 MMOL/L (ref 3.5–5.2)
PROT SERPL-MCNC: 7.5 G/DL (ref 6–8.5)
RBC # BLD AUTO: 4.26 10*6/MM3 (ref 3.77–5.28)
SODIUM BLD-SCNC: 143 MMOL/L (ref 136–145)
WBC NRBC COR # BLD: 7.14 10*3/MM3 (ref 3.4–10.8)

## 2020-03-02 PROCEDURE — 80050 GENERAL HEALTH PANEL: CPT | Performed by: INTERNAL MEDICINE

## 2020-03-02 PROCEDURE — 84439 ASSAY OF FREE THYROXINE: CPT | Performed by: INTERNAL MEDICINE

## 2020-03-02 PROCEDURE — 82747 ASSAY OF FOLIC ACID RBC: CPT | Performed by: INTERNAL MEDICINE

## 2020-03-02 PROCEDURE — 82607 VITAMIN B-12: CPT | Performed by: INTERNAL MEDICINE

## 2020-03-02 PROCEDURE — 36415 COLL VENOUS BLD VENIPUNCTURE: CPT | Performed by: INTERNAL MEDICINE

## 2020-03-02 PROCEDURE — 85014 HEMATOCRIT: CPT | Performed by: INTERNAL MEDICINE

## 2020-03-03 LAB
T4 FREE SERPL-MCNC: 1.29 NG/DL (ref 0.93–1.7)
TSH SERPL DL<=0.05 MIU/L-ACNC: 2.87 UIU/ML (ref 0.27–4.2)
VIT B12 BLD-MCNC: 580 PG/ML (ref 211–946)

## 2020-03-04 LAB
FOLATE BLD-MCNC: 426 NG/ML
FOLATE RBC-MCNC: 1154 NG/ML
HCT VFR BLD AUTO: 36.9 % (ref 34–46.6)

## 2020-03-05 ENCOUNTER — OFFICE VISIT (OUTPATIENT)
Dept: ENDOCRINOLOGY | Facility: CLINIC | Age: 50
End: 2020-03-05

## 2020-03-05 VITALS
BODY MASS INDEX: 38.21 KG/M2 | HEART RATE: 94 BPM | DIASTOLIC BLOOD PRESSURE: 90 MMHG | SYSTOLIC BLOOD PRESSURE: 130 MMHG | OXYGEN SATURATION: 95 % | HEIGHT: 60 IN | WEIGHT: 194.6 LBS

## 2020-03-05 DIAGNOSIS — L40.9 PSORIASIS: ICD-10-CM

## 2020-03-05 DIAGNOSIS — E05.00 GRAVES' DISEASE: ICD-10-CM

## 2020-03-05 DIAGNOSIS — E05.90 HYPERTHYROIDISM: Primary | ICD-10-CM

## 2020-03-05 DIAGNOSIS — K90.41 GLUTEN INTOLERANCE: ICD-10-CM

## 2020-03-05 DIAGNOSIS — E66.9 OBESITY WITHOUT SERIOUS COMORBIDITY, UNSPECIFIED CLASSIFICATION, UNSPECIFIED OBESITY TYPE: ICD-10-CM

## 2020-03-05 DIAGNOSIS — E53.8 B12 DEFICIENCY: ICD-10-CM

## 2020-03-05 DIAGNOSIS — E61.1 IRON DEFICIENCY: ICD-10-CM

## 2020-03-05 PROCEDURE — 99214 OFFICE O/P EST MOD 30 MIN: CPT | Performed by: INTERNAL MEDICINE

## 2020-03-05 RX ORDER — PHENTERMINE HYDROCHLORIDE 37.5 MG/1
37.5 TABLET ORAL
Qty: 30 TABLET | Refills: 5 | Status: SHIPPED | OUTPATIENT
Start: 2020-03-05 | End: 2020-09-10 | Stop reason: SDUPTHER

## 2020-03-05 NOTE — PROGRESS NOTES
Nicolas Amaro is a 49 y.o. female who presents for  evaluation of   Chief Complaint   Patient presents with   • Hashimoto's Thyroiditis       Referring provider  Primary Care Provider    Donny Rincon MD    Duration 10 years    Timing - Graves' Disease is constant    Started as Hypothyroidism , transitioned to hyperthyroidism     Quality -  Controlled on methimazole     Severity -  moderate    Complications - Thyroid storm in the past    Current symptoms/problems  see ROS        Past Medical History:   Diagnosis Date   • Anxiety    • Autoimmune thyroiditis    • Fatigue    • Graves disease    • Graves disease    • Graves disease    • Hashimoto's thyroiditis    • History of cold-induced urticaria    • Migraines    • Multiple exostosis, hereditary    • Osteochondral defect      Family History   Problem Relation Age of Onset   • Kidney disease Father    • Diabetes Son    • Heart disease Maternal Grandmother    • Cancer Paternal Grandfather    • Breast cancer Neg Hx    • Ovarian cancer Neg Hx    • Uterine cancer Neg Hx    • Colon cancer Neg Hx      Social History     Tobacco Use   • Smoking status: Never Smoker   • Smokeless tobacco: Never Used   Substance Use Topics   • Alcohol use: No   • Drug use: No         Current Outpatient Medications:   •  Cholecalciferol 1.25 MG (69451 UT) tablet, 50 thousand units po q 2 weeks, Disp: 2 tablet, Rfl: 11  •  cyanocobalamin 1000 MCG/ML injection, Inject 1 mL into the appropriate muscle as directed by prescriber Every 28 (Twenty-Eight) Days., Disp: 1 mL, Rfl: 11  •  ferrous sulfate 325 (65 FE) MG tablet, 325 mg po bid with  along with 250 mg of vit C, Disp: 30 tablet, Rfl: 3  •  methIMAzole (TAPAZOLE) 5 MG tablet, Take 1.5 tablets by mouth 3 (Three) Times a Day., Disp: 45 tablet, Rfl: 11  •  vitamin C (ASCORBIC ACID) 250 MG tablet, 250 mg po bid, Disp: 60 tablet, Rfl: 11  •  phentermine (ADIPEX-P) 37.5 MG tablet, Take 1 tablet by mouth Every Morning Before Breakfast., Disp: 30  "tablet, Rfl: 5    Review of Systems    Review of Systems   Constitutional: Positive for unexpected weight change. Negative for activity change, appetite change, chills, diaphoresis, fatigue and fever.   HENT: Negative for congestion, dental problem, drooling, ear discharge, ear pain, facial swelling, mouth sores, postnasal drip, rhinorrhea, sinus pressure, sore throat, tinnitus, trouble swallowing and voice change.    Eyes: Negative for photophobia, pain, discharge, redness, itching and visual disturbance.   Respiratory: Negative for apnea, cough, choking, chest tightness, shortness of breath, wheezing and stridor.    Cardiovascular: Negative for chest pain, palpitations and leg swelling.   Gastrointestinal: Negative for abdominal distention, abdominal pain, constipation, diarrhea, nausea and vomiting.   Endocrine: Positive for polyuria. Negative for cold intolerance, heat intolerance, polydipsia and polyphagia.   Genitourinary: Negative for decreased urine volume, difficulty urinating, dysuria, flank pain, frequency, hematuria, menstrual problem and urgency.   Musculoskeletal: Negative for arthralgias, back pain, gait problem, joint swelling, myalgias, neck pain and neck stiffness.   Skin: Negative for color change, pallor, rash and wound.   Allergic/Immunologic: Negative for immunocompromised state.   Neurological: Negative for dizziness, seizures, syncope, facial asymmetry, speech difficulty, weakness, light-headedness, numbness and headaches.   Hematological: Negative for adenopathy.   Psychiatric/Behavioral: Negative for agitation, behavioral problems, confusion, decreased concentration, dysphoric mood, hallucinations, self-injury, sleep disturbance and suicidal ideas. The patient is not nervous/anxious and is not hyperactive.         Objective:   /90   Pulse 94   Ht 151.6 cm (59.7\")   Wt 88.3 kg (194 lb 9.6 oz)   SpO2 95%   BMI 38.39 kg/m²     Physical Exam   Constitutional: She is oriented to " person, place, and time. She appears well-developed.   HENT:   Head: Normocephalic.   Right Ear: External ear normal.   Left Ear: External ear normal.   Nose: Nose normal.   Eyes: Conjunctivae and EOM are normal. No scleral icterus.   Neck: Normal range of motion. Neck supple. No tracheal deviation present. No thyromegaly present.   Cardiovascular: Normal rate, regular rhythm, normal heart sounds and intact distal pulses. Exam reveals no gallop and no friction rub.   No murmur heard.  Pulmonary/Chest: Effort normal and breath sounds normal. No stridor. No respiratory distress. She has no wheezes. She has no rales. She exhibits no tenderness.   Abdominal: Soft. Bowel sounds are normal. She exhibits no distension and no mass. There is no tenderness. There is no rebound and no guarding.   Musculoskeletal: Normal range of motion. She exhibits no tenderness or deformity.   Lymphadenopathy:     She has no cervical adenopathy.   Neurological: She is alert and oriented to person, place, and time. She displays normal reflexes. She exhibits normal muscle tone. Coordination normal.   Skin: No rash noted. No erythema. No pallor.   Psoriatic plaques feet    Psychiatric: She has a normal mood and affect. Her behavior is normal. Judgment and thought content normal.       Lab Review    Results for orders placed or performed in visit on 02/08/20   CBC Auto Differential   Result Value Ref Range    WBC 7.14 3.40 - 10.80 10*3/mm3    RBC 4.26 3.77 - 5.28 10*6/mm3    Hemoglobin 12.0 12.0 - 15.9 g/dL    Hematocrit 37.2 34.0 - 46.6 %    MCV 87.3 79.0 - 97.0 fL    MCH 28.2 26.6 - 33.0 pg    MCHC 32.3 31.5 - 35.7 g/dL    RDW 15.7 (H) 12.3 - 15.4 %    RDW-SD 49.2 37.0 - 54.0 fl    MPV 11.2 6.0 - 12.0 fL    Platelets 252 140 - 450 10*3/mm3    Neutrophil % 66.0 42.7 - 76.0 %    Lymphocyte % 27.2 19.6 - 45.3 %    Monocyte % 5.3 5.0 - 12.0 %    Eosinophil % 1.0 0.3 - 6.2 %    Basophil % 0.4 0.0 - 1.5 %    Immature Grans % 0.1 0.0 - 0.5 %     Neutrophils, Absolute 4.71 1.70 - 7.00 10*3/mm3    Lymphocytes, Absolute 1.94 0.70 - 3.10 10*3/mm3    Monocytes, Absolute 0.38 0.10 - 0.90 10*3/mm3    Eosinophils, Absolute 0.07 0.00 - 0.40 10*3/mm3    Basophils, Absolute 0.03 0.00 - 0.20 10*3/mm3    Immature Grans, Absolute 0.01 0.00 - 0.05 10*3/mm3    nRBC 0.0 0.0 - 0.2 /100 WBC   Comprehensive Metabolic Panel   Result Value Ref Range    Glucose 93 65 - 99 mg/dL    BUN 11 6 - 20 mg/dL    Creatinine 0.64 0.57 - 1.00 mg/dL    Sodium 143 136 - 145 mmol/L    Potassium 4.3 3.5 - 5.2 mmol/L    Chloride 105 98 - 107 mmol/L    CO2 26.0 22.0 - 29.0 mmol/L    Calcium 9.4 8.6 - 10.5 mg/dL    Total Protein 7.5 6.0 - 8.5 g/dL    Albumin 4.50 3.50 - 5.20 g/dL    ALT (SGPT) 13 1 - 33 U/L    AST (SGOT) 16 1 - 32 U/L    Alkaline Phosphatase 114 39 - 117 U/L    Total Bilirubin 0.2 0.2 - 1.2 mg/dL    eGFR Non African Amer 99 >60 mL/min/1.73    Globulin 3.0 gm/dL    A/G Ratio 1.5 g/dL    BUN/Creatinine Ratio 17.2 7.0 - 25.0    Anion Gap 12.0 5.0 - 15.0 mmol/L   Iron Profile   Result Value Ref Range    Iron 20 (L) 37 - 145 mcg/dL    Iron Saturation 4 (L) 20 - 50 %    Transferrin 306 200 - 360 mg/dL    TIBC 456 298 - 536 mcg/dL   Folate RBC   Result Value Ref Range    Folate, Hemolysate 426.0 Not Estab. ng/mL    Hematocrit 36.9 34.0 - 46.6 %    RBC Folate 1154 >498 ng/mL   Vitamin B12   Result Value Ref Range    Vitamin B-12 580 211 - 946 pg/mL   TSH   Result Value Ref Range    TSH 2.870 0.270 - 4.200 uIU/mL   T4, Free   Result Value Ref Range    Free T4 1.29 0.93 - 1.70 ng/dL           Assessment/Plan       ICD-10-CM ICD-9-CM   1. Hyperthyroidism E05.90 242.90   2. Graves' disease E05.00 242.00   3. B12 deficiency E53.8 266.2   4. Obesity without serious comorbidity, unspecified classification, unspecified obesity type E66.9 278.00   5. Psoriasis L40.9 696.1   6. Iron deficiency E61.1 280.9   7. Gluten intolerance K90.41 579.0       Lab Results   Component Value Date    TSH 2.870  03/02/2020    TSH 5.580 (H) 02/05/2020    TSH 2.130 01/03/2020       Lab Results   Component Value Date    FREET4 1.29 03/02/2020    FREET4 0.96 02/05/2020    FREET4 1.11 01/03/2020       Lab Results   Component Value Date    G3XDQZI 96 12/15/2017    O7DKCTF 92 06/21/2017    O0ZZCQP 416.0 (H) 05/05/2017       Thyroid Antibodies  TPO AB  Lab Results   Component Value Date    THYROIDAB 104 (H) 12/15/2017       Thyroid Stimulating Immunoglobulin    Lab Results   Component Value Date    LABTHYR 262 (H) 12/15/2017         Methimazole now on 5 mg daily and working    atenolol 25 mg daily - may use PRN for palpitations        Assess      psoariasis     Topical steroid    Kenalog injection, before     Arthralgias,     We ruled out celiac and sle in May 2017    --    Obesity , phentermine in addition to lifestyle changes     --    Low iron    Taking ferrous sulfate, taking big , or add vit C     Obtain celiac     I reviewed and summarized records from Donny Rincon MD from 2020  and I reviewed / ordered labs.     Orders Placed This Encounter   Procedures   • Thyroid Stimulating Immunoglobulin     Standing Status:   Standing     Number of Occurrences:   6     Standing Expiration Date:   3/5/2021   • Thyroid Peroxidase Antibody     Standing Status:   Standing     Number of Occurrences:   6     Standing Expiration Date:   3/5/2021   • TSH     Standing Status:   Standing     Number of Occurrences:   6     Standing Expiration Date:   3/5/2021   • T4, Free     Standing Status:   Standing     Number of Occurrences:   6     Standing Expiration Date:   3/5/2021   • T3, Free     Standing Status:   Standing     Number of Occurrences:   6     Standing Expiration Date:   3/5/2021   • Comprehensive Metabolic Panel     Standing Status:   Standing     Number of Occurrences:   6     Standing Expiration Date:   3/5/2021   • Iron Profile   • Celiac Panel Reflex To Titer   • Ambulatory Referral to Rheumatology     Referral Priority:   Routine      Referral Type:   Consultation     Referral Reason:   Specialty Services Required     Requested Specialty:   Rheumatology     Number of Visits Requested:   1   • CBC & Differential     Standing Status:   Standing     Number of Occurrences:   6     Standing Expiration Date:   3/5/2021     Order Specific Question:   Manual Differential     Answer:   No         A copy of my note was sent to Donny Rincon MD    Please see my above opinion and suggestions.

## 2020-06-04 ENCOUNTER — LAB (OUTPATIENT)
Dept: LAB | Facility: HOSPITAL | Age: 50
End: 2020-06-04

## 2020-06-04 DIAGNOSIS — E05.00 GRAVES' DISEASE: ICD-10-CM

## 2020-06-04 DIAGNOSIS — E53.8 B12 DEFICIENCY: ICD-10-CM

## 2020-06-04 DIAGNOSIS — E66.9 OBESITY WITHOUT SERIOUS COMORBIDITY, UNSPECIFIED CLASSIFICATION, UNSPECIFIED OBESITY TYPE: ICD-10-CM

## 2020-06-04 DIAGNOSIS — E05.90 HYPERTHYROIDISM: ICD-10-CM

## 2020-06-04 LAB
ALBUMIN SERPL-MCNC: 4.5 G/DL (ref 3.5–5)
ALBUMIN/GLOB SERPL: 1.3 G/DL (ref 1.1–2.5)
ALP SERPL-CCNC: 147 U/L (ref 24–120)
ALT SERPL W P-5'-P-CCNC: 26 U/L (ref 0–35)
ANION GAP SERPL CALCULATED.3IONS-SCNC: 12 MMOL/L (ref 4–13)
AST SERPL-CCNC: 27 U/L (ref 7–45)
AUTO MIXED CELLS #: 0.4 10*3/MM3 (ref 0.1–2.6)
AUTO MIXED CELLS %: 4.9 % (ref 0.1–24)
BILIRUB SERPL-MCNC: 0.4 MG/DL (ref 0.1–1)
BUN BLD-MCNC: 13 MG/DL (ref 5–21)
BUN/CREAT SERPL: 15.5
CALCIUM SPEC-SCNC: 9.5 MG/DL (ref 8.4–10.4)
CHLORIDE SERPL-SCNC: 101 MMOL/L (ref 98–110)
CO2 SERPL-SCNC: 29 MMOL/L (ref 24–31)
CREAT BLD-MCNC: 0.84 MG/DL (ref 0.5–1.4)
ERYTHROCYTE [DISTWIDTH] IN BLOOD BY AUTOMATED COUNT: 14.3 % (ref 12.3–15.4)
GFR SERPL CREATININE-BSD FRML MDRD: 72 ML/MIN/1.73
GLOBULIN UR ELPH-MCNC: 3.5 GM/DL
GLUCOSE BLD-MCNC: 94 MG/DL (ref 70–100)
HCT VFR BLD AUTO: 38 % (ref 34–46.6)
HGB BLD-MCNC: 12.6 G/DL (ref 12–15.9)
LYMPHOCYTES # BLD AUTO: 2.1 10*3/MM3 (ref 0.7–3.1)
LYMPHOCYTES NFR BLD AUTO: 25.1 % (ref 19.6–45.3)
MCH RBC QN AUTO: 29.3 PG (ref 26.6–33)
MCHC RBC AUTO-ENTMCNC: 33.2 G/DL (ref 31.5–35.7)
MCV RBC AUTO: 88.4 FL (ref 79–97)
NEUTROPHILS # BLD AUTO: 5.7 10*3/MM3 (ref 1.7–7)
NEUTROPHILS NFR BLD AUTO: 70 % (ref 42.7–76)
PLATELET # BLD AUTO: 217 10*3/MM3 (ref 140–450)
PMV BLD AUTO: 10.5 FL (ref 6–12)
POTASSIUM BLD-SCNC: 4 MMOL/L (ref 3.5–5.3)
PROT SERPL-MCNC: 8 G/DL (ref 6.3–8.7)
RBC # BLD AUTO: 4.3 10*6/MM3 (ref 3.77–5.28)
SODIUM BLD-SCNC: 142 MMOL/L (ref 135–145)
WBC NRBC COR # BLD: 8.2 10*3/MM3 (ref 3.4–10.8)

## 2020-06-04 PROCEDURE — 86376 MICROSOMAL ANTIBODY EACH: CPT | Performed by: INTERNAL MEDICINE

## 2020-06-04 PROCEDURE — 84439 ASSAY OF FREE THYROXINE: CPT | Performed by: INTERNAL MEDICINE

## 2020-06-04 PROCEDURE — 84481 FREE ASSAY (FT-3): CPT | Performed by: INTERNAL MEDICINE

## 2020-06-04 PROCEDURE — 84445 ASSAY OF TSI GLOBULIN: CPT | Performed by: INTERNAL MEDICINE

## 2020-06-04 PROCEDURE — 80053 COMPREHEN METABOLIC PANEL: CPT

## 2020-06-04 PROCEDURE — 36415 COLL VENOUS BLD VENIPUNCTURE: CPT

## 2020-06-04 PROCEDURE — 85025 COMPLETE CBC W/AUTO DIFF WBC: CPT

## 2020-06-04 PROCEDURE — 80050 GENERAL HEALTH PANEL: CPT

## 2020-06-05 LAB
T3FREE SERPL-MCNC: 2.65 PG/ML (ref 2–4.4)
T4 FREE SERPL-MCNC: 1.44 NG/DL (ref 0.93–1.7)
TSH SERPL DL<=0.05 MIU/L-ACNC: 1.54 UIU/ML (ref 0.27–4.2)

## 2020-06-06 LAB — THYROPEROXIDASE AB SERPL-ACNC: 128 IU/ML (ref 0–34)

## 2020-06-07 LAB — TSI SER-MCNC: 0.79 IU/L (ref 0–0.55)

## 2020-09-10 ENCOUNTER — TELEMEDICINE (OUTPATIENT)
Dept: ENDOCRINOLOGY | Facility: CLINIC | Age: 50
End: 2020-09-10

## 2020-09-10 DIAGNOSIS — E05.00 GRAVES' DISEASE: Primary | ICD-10-CM

## 2020-09-10 DIAGNOSIS — E66.9 OBESITY WITHOUT SERIOUS COMORBIDITY, UNSPECIFIED CLASSIFICATION, UNSPECIFIED OBESITY TYPE: ICD-10-CM

## 2020-09-10 DIAGNOSIS — L40.9 PSORIASIS: ICD-10-CM

## 2020-09-10 DIAGNOSIS — E55.9 VITAMIN D DEFICIENCY: ICD-10-CM

## 2020-09-10 DIAGNOSIS — E53.8 B12 DEFICIENCY: ICD-10-CM

## 2020-09-10 DIAGNOSIS — E05.90 HYPERTHYROIDISM: ICD-10-CM

## 2020-09-10 DIAGNOSIS — D64.9 ANEMIA, UNSPECIFIED TYPE: ICD-10-CM

## 2020-09-10 PROCEDURE — 99214 OFFICE O/P EST MOD 30 MIN: CPT | Performed by: INTERNAL MEDICINE

## 2020-09-10 RX ORDER — PHENTERMINE HYDROCHLORIDE 37.5 MG/1
37.5 TABLET ORAL
Qty: 30 TABLET | Refills: 5 | Status: SHIPPED | OUTPATIENT
Start: 2020-09-10 | End: 2021-03-03

## 2020-09-10 NOTE — PROGRESS NOTES
Nicolas Amaro is a 50 y.o. female who presents for  evaluation of   Graves' Disease    Referring provider  Primary Care Provider    Donny Rincon MD    Duration 10 years    Timing - Graves' Disease is constant    Started as Hypothyroidism , transitioned to hyperthyroidism     Quality -  Controlled on methimazole     Severity -  moderate    Complications - Thyroid storm in the past    Current symptoms/problems  see ROS        Past Medical History:   Diagnosis Date   • Anxiety    • Autoimmune thyroiditis    • Fatigue    • Graves disease    • Graves disease    • Graves disease    • Hashimoto's thyroiditis    • History of cold-induced urticaria    • Migraines    • Multiple exostosis, hereditary    • Osteochondral defect      Family History   Problem Relation Age of Onset   • Kidney disease Father    • Diabetes Son    • Heart disease Maternal Grandmother    • Cancer Paternal Grandfather    • Breast cancer Neg Hx    • Ovarian cancer Neg Hx    • Uterine cancer Neg Hx    • Colon cancer Neg Hx      Social History     Tobacco Use   • Smoking status: Never Smoker   • Smokeless tobacco: Never Used   Substance Use Topics   • Alcohol use: No   • Drug use: No         Current Outpatient Medications:   •  Cholecalciferol 1.25 MG (86460 UT) tablet, 50 thousand units po q 2 weeks, Disp: 2 tablet, Rfl: 11  •  cyanocobalamin 1000 MCG/ML injection, Inject 1 mL into the appropriate muscle as directed by prescriber Every 28 (Twenty-Eight) Days., Disp: 1 mL, Rfl: 11  •  ferrous sulfate 325 (65 FE) MG tablet, 325 mg po bid with  along with 250 mg of vit C, Disp: 30 tablet, Rfl: 3  •  methIMAzole (TAPAZOLE) 5 MG tablet, Take 1.5 tablets by mouth 3 (Three) Times a Day., Disp: 45 tablet, Rfl: 11  •  phentermine (ADIPEX-P) 37.5 MG tablet, Take 1 tablet by mouth Every Morning Before Breakfast., Disp: 30 tablet, Rfl: 5  •  vitamin C (ASCORBIC ACID) 250 MG tablet, 250 mg po bid, Disp: 60 tablet, Rfl: 11    Review of Systems    Review of  Systems   Constitutional: Positive for unexpected weight change. Negative for activity change, appetite change, chills, diaphoresis, fatigue and fever.   HENT: Negative for congestion, dental problem, drooling, ear discharge, ear pain, facial swelling, mouth sores, postnasal drip, rhinorrhea, sinus pressure, sore throat, tinnitus, trouble swallowing and voice change.    Eyes: Negative for photophobia, pain, discharge, redness, itching and visual disturbance.   Respiratory: Negative for apnea, cough, choking, chest tightness, shortness of breath, wheezing and stridor.    Cardiovascular: Negative for chest pain, palpitations and leg swelling.   Gastrointestinal: Negative for abdominal distention, abdominal pain, constipation, diarrhea, nausea and vomiting.   Endocrine: Positive for polyuria. Negative for cold intolerance, heat intolerance, polydipsia and polyphagia.   Genitourinary: Negative for decreased urine volume, difficulty urinating, dysuria, flank pain, frequency, hematuria, menstrual problem and urgency.   Musculoskeletal: Negative for arthralgias, back pain, gait problem, joint swelling, myalgias, neck pain and neck stiffness.   Skin: Negative for color change, pallor, rash and wound.   Allergic/Immunologic: Negative for immunocompromised state.   Neurological: Negative for dizziness, seizures, syncope, facial asymmetry, speech difficulty, weakness, light-headedness, numbness and headaches.   Hematological: Negative for adenopathy.   Psychiatric/Behavioral: Negative for agitation, behavioral problems, confusion, decreased concentration, dysphoric mood, hallucinations, self-injury, sleep disturbance and suicidal ideas. The patient is not nervous/anxious and is not hyperactive.         Objective:   There were no vitals taken for this visit.    Physical Exam   Constitutional: She is oriented to person, place, and time. She appears well-developed.   HENT:   Head: Normocephalic.   Right Ear: External ear normal.    Left Ear: External ear normal.   Nose: Nose normal.   Eyes: Conjunctivae and EOM are normal. No scleral icterus.   Neck: Normal range of motion. Neck supple. No tracheal deviation present. No thyromegaly present.   Cardiovascular: Normal rate, regular rhythm, normal heart sounds and intact distal pulses. Exam reveals no gallop and no friction rub.   No murmur heard.  Pulmonary/Chest: Effort normal and breath sounds normal. No stridor. No respiratory distress. She has no wheezes. She has no rales. She exhibits no tenderness.   Abdominal: Soft. Bowel sounds are normal. She exhibits no distension and no mass. There is no tenderness. There is no rebound and no guarding.   Musculoskeletal: Normal range of motion. She exhibits no tenderness or deformity.   Lymphadenopathy:     She has no cervical adenopathy.   Neurological: She is alert and oriented to person, place, and time. She displays normal reflexes. She exhibits normal muscle tone. Coordination normal.   Skin: No rash noted. No erythema. No pallor.   Psoriatic plaques feet    Psychiatric: She has a normal mood and affect. Her behavior is normal. Judgment and thought content normal.       Lab Review    Results for orders placed or performed in visit on 06/04/20   Thyroid Stimulating Immunoglobulin   Result Value Ref Range    Thyroid Stimulating Immunoglobulin 0.79 (H) 0.00 - 0.55 IU/L   Thyroid Peroxidase Antibody   Result Value Ref Range    Thyroid Peroxidase Antibody 128 (H) 0 - 34 IU/mL   TSH   Result Value Ref Range    TSH 1.540 0.270 - 4.200 uIU/mL   T4, Free   Result Value Ref Range    Free T4 1.44 0.93 - 1.70 ng/dL   T3, Free   Result Value Ref Range    T3, Free 2.65 2.00 - 4.40 pg/mL   Comprehensive Metabolic Panel   Result Value Ref Range    Glucose 94 70 - 100 mg/dL    BUN 13 5 - 21 mg/dL    Creatinine 0.84 0.50 - 1.40 mg/dL    Sodium 142 135 - 145 mmol/L    Potassium 4.0 3.5 - 5.3 mmol/L    Chloride 101 98 - 110 mmol/L    CO2 29.0 24.0 - 31.0 mmol/L     Calcium 9.5 8.4 - 10.4 mg/dL    Total Protein 8.0 6.3 - 8.7 g/dL    Albumin 4.50 3.50 - 5.00 g/dL    ALT (SGPT) 26 0 - 35 U/L    AST (SGOT) 27 7 - 45 U/L    Alkaline Phosphatase 147 (H) 24 - 120 U/L    Total Bilirubin 0.4 0.1 - 1.0 mg/dL    eGFR Non African Amer 72 >60 mL/min/1.73    Globulin 3.5 gm/dL    A/G Ratio 1.3 1.1 - 2.5 g/dL    BUN/Creatinine Ratio 15.5      Anion Gap 12.0 4.0 - 13.0 mmol/L   CBC Auto Differential   Result Value Ref Range    WBC 8.20 3.40 - 10.80 10*3/mm3    RBC 4.30 3.77 - 5.28 10*6/mm3    Hemoglobin 12.6 12.0 - 15.9 g/dL    Hematocrit 38.0 34.0 - 46.6 %    MCV 88.4 79.0 - 97.0 fL    MCH 29.3 26.6 - 33.0 pg    MCHC 33.2 31.5 - 35.7 g/dL    RDW 14.3 12.3 - 15.4 %    MPV 10.5 6.0 - 12.0 fL    Platelets 217 140 - 450 10*3/mm3    Neutrophil % 70.0 42.7 - 76.0 %    Lymphocyte % 25.1 19.6 - 45.3 %    Auto Mixed Cells % 4.9 0.1 - 24.0 %    Neutrophils, Absolute 5.70 1.70 - 7.00 10*3/mm3    Lymphocytes, Absolute 2.10 0.70 - 3.10 10*3/mm3    Auto Mixed Cells # 0.40 0.10 - 2.60 10*3/mm3           Assessment/Plan       ICD-10-CM ICD-9-CM   1. Graves' disease E05.00 242.00   2. Hyperthyroidism E05.90 242.90   3. B12 deficiency E53.8 266.2   4. Psoriasis L40.9 696.1   5. Vitamin D deficiency E55.9 268.9   6. Anemia, unspecified type D64.9 285.9   7. Obesity without serious comorbidity, unspecified classification, unspecified obesity type E66.9 278.00       Lab Results   Component Value Date    TSH 1.540 06/04/2020    TSH 2.870 03/02/2020    TSH 5.580 (H) 02/05/2020       Lab Results   Component Value Date    FREET4 1.44 06/04/2020    FREET4 1.29 03/02/2020    FREET4 0.96 02/05/2020       Lab Results   Component Value Date    K1IEBWD 96 12/15/2017    B5PJXVW 92 06/21/2017    C9RHHSF 416.0 (H) 05/05/2017       Thyroid Antibodies  TPO AB  Lab Results   Component Value Date    THYROIDAB 128 (H) 06/04/2020       Thyroid Stimulating Immunoglobulin    Lab Results   Component Value Date    LABTHYR 262 (H)  12/15/2017         Methimazole now on 5 mg daily and working    atenolol 25 mg daily - may use PRN for palpitations        Assess      psoariasis     Topical steroid    Kenalog injection, before     Arthralgias,     We ruled out celiac and sle in May 2017    --    Obesity , phentermine in addition to lifestyle changes        --    Low iron    Taking ferrous sulfate, taking big , or add vit C     Obtain celiac     I reviewed and summarized records from Donny Rincon MD from 2020  and I reviewed / ordered labs.     Orders Placed This Encounter   Procedures   • TSH   • T4, Free   • T3, Free   • Thyroid Stimulating Immunoglobulin   • Thyroid Peroxidase Antibody   • Comprehensive Metabolic Panel   • Vitamin D 25 Hydroxy   • Vitamin B12   • Iron Profile   • Ferritin   • CBC & Differential     Order Specific Question:   Manual Differential     Answer:   No         A copy of my note was sent to Donny Rincon MD    Please see my above opinion and suggestions.     I spent 15 minutes reviewing patient electronic chart , reviewing medications , past history , active problems.   I provided advice regarding management of medical conditions, refilled prescriptions , ordered labs and arranged for future appointment.   Patient was advised to contact us if there were any unanswered questions or ongoing concerns.

## 2020-10-10 ENCOUNTER — LAB (OUTPATIENT)
Dept: LAB | Facility: HOSPITAL | Age: 50
End: 2020-10-10

## 2020-10-10 DIAGNOSIS — E66.9 OBESITY WITHOUT SERIOUS COMORBIDITY, UNSPECIFIED CLASSIFICATION, UNSPECIFIED OBESITY TYPE: ICD-10-CM

## 2020-10-10 DIAGNOSIS — E53.8 B12 DEFICIENCY: ICD-10-CM

## 2020-10-10 DIAGNOSIS — E05.00 GRAVES' DISEASE: ICD-10-CM

## 2020-10-10 DIAGNOSIS — E05.90 HYPERTHYROIDISM: ICD-10-CM

## 2020-10-10 PROCEDURE — 36415 COLL VENOUS BLD VENIPUNCTURE: CPT

## 2020-10-10 PROCEDURE — 84445 ASSAY OF TSI GLOBULIN: CPT | Performed by: INTERNAL MEDICINE

## 2020-10-10 PROCEDURE — 86376 MICROSOMAL ANTIBODY EACH: CPT | Performed by: INTERNAL MEDICINE

## 2020-10-12 LAB — THYROPEROXIDASE AB SERPL-ACNC: 215 IU/ML (ref 0–34)

## 2020-10-13 LAB — TSI SER-ACNC: 0.52 IU/L (ref 0–0.55)

## 2020-12-29 ENCOUNTER — TELEPHONE (OUTPATIENT)
Dept: ENDOCRINOLOGY | Facility: CLINIC | Age: 50
End: 2020-12-29

## 2021-01-08 ENCOUNTER — TELEPHONE (OUTPATIENT)
Dept: FAMILY MEDICINE CLINIC | Facility: CLINIC | Age: 51
End: 2021-01-08

## 2021-01-08 NOTE — TELEPHONE ENCOUNTER
----- Message from SPENSER Zhong sent at 1/8/2021 11:53 AM CST -----  Regarding: FW: Non-Urgent Medical Question  Contact: [phone]  We can do it based on her history. I dont mind    ----- Message -----  From: Monica Eng RN  Sent: 1/8/2021  11:48 AM CST  To: [facility]  Subject: FW: Non-Urgent Medical Question                  We haven't seen this patient since Feb 2020, and haven't prescribed any medications since then.    Am I ok to respond back to her that we will not be able to write this letter for her?  ----- Message -----  From: Nicolas Amaro  Sent: 1/8/2021  10:15 AM CST  To: [facility]  Subject: Non-Urgent Medical Question                      Hi, I've tried to call but haven't been able to get through. I'm needing documentation for my work that I meet the criteria as a high-risk person, in order to cont. working virtually. They require verif from my health care provider of being high-risk as outlined in [employer]'s Exec. Order #[id number] (12/18/20.) The criteria listed includes those w/a BMI >/= 35, an immunosuppressive disease, or receiving immunosuppressive tx. At my ht & wt I think I meet the BMI criteria. Also, autoimmune disorders I have & the methimazole I take have been researched & found to be immunosuppresive (although my current endo doesn't believe so). So reid if that meets it or not. But if the nurse cld call me to talk about it, I'd appreciate it! Thank you

## 2021-01-11 ENCOUNTER — OFFICE VISIT (OUTPATIENT)
Dept: FAMILY MEDICINE CLINIC | Facility: CLINIC | Age: 51
End: 2021-01-11

## 2021-01-11 VITALS
HEIGHT: 61 IN | RESPIRATION RATE: 16 BRPM | TEMPERATURE: 97.1 F | DIASTOLIC BLOOD PRESSURE: 89 MMHG | WEIGHT: 186.5 LBS | OXYGEN SATURATION: 98 % | BODY MASS INDEX: 35.21 KG/M2 | HEART RATE: 98 BPM | SYSTOLIC BLOOD PRESSURE: 129 MMHG

## 2021-01-11 DIAGNOSIS — E05.00 GRAVES' DISEASE: Primary | ICD-10-CM

## 2021-01-11 DIAGNOSIS — F41.9 ANXIETY: ICD-10-CM

## 2021-01-11 DIAGNOSIS — E66.01 MORBID (SEVERE) OBESITY DUE TO EXCESS CALORIES (HCC): ICD-10-CM

## 2021-01-11 PROCEDURE — 99213 OFFICE O/P EST LOW 20 MIN: CPT | Performed by: NURSE PRACTITIONER

## 2021-01-11 RX ORDER — METHIMAZOLE 5 MG/1
TABLET ORAL
COMMUNITY
End: 2021-02-09 | Stop reason: SDUPTHER

## 2021-01-11 NOTE — PROGRESS NOTES
"Chief Complaint  abnormal bmi (She wants documentation based on her BMI being abnormal that she should be working from home.)    Subjective    History of Present Illness      Patient presents to [facility] for   Patient states that she is here today to see if she can get a letter written stating that she is a high risk patient so that she can work from home per [employer] regulations.  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  They have the option to work from home but needs a letter for this.  She states that she has a history of autoimmune disease such as Graves' disease, anxiety, and a BMI of 35.  She states that all of these should qualify under the regulation that [employer] has set.       Review of Systems   Constitutional: Negative.    HENT: Negative.    Eyes: Negative.    Respiratory: Negative.    Cardiovascular: Negative.    Gastrointestinal: Negative.    Endocrine: Negative.    Genitourinary: Negative.    Musculoskeletal: Negative.    Allergic/Immunologic: Negative.    Neurological: Negative.    Hematological: Negative.    Psychiatric/Behavioral: Negative.    All other systems reviewed and are negative.          Objective   Vital Signs:   /89 (BP Location: Left arm, Patient Position: Sitting)   Pulse 98   Temp 97.1 °F (36.2 °C)   Resp 16   Ht 154.9 cm (61\")   Wt 84.6 kg (186 lb 8 oz)   SpO2 98%   BMI 35.24 kg/m²       Physical Exam  Vitals signs and nursing note reviewed.   Constitutional:       Appearance: She is obese.   HENT:      Nose: Nose normal.   Eyes:      Extraocular Movements: Extraocular movements intact.      Pupils: Pupils are equal, round, and reactive to light.   Neck:      Musculoskeletal: Normal range of motion and neck supple.   Cardiovascular:      Pulses: Normal pulses.      Heart sounds: Normal heart sounds.   Pulmonary:      Effort: Pulmonary effort is normal.      Breath sounds: Normal " breath sounds.   Musculoskeletal: Normal range of motion.   Skin:     General: Skin is warm and dry.   Neurological:      General: No focal deficit present.      Mental Status: She is oriented to person, place, and time.   Psychiatric:         Mood and Affect: Mood normal.         Behavior: Behavior normal.         Thought Content: Thought content normal.         Judgment: Judgment normal.                     Assessment and Plan    Patient's Body mass index is 35.24 kg/m². BMI is above normal parameters. Recommendations include: educational material, exercise counseling and nutrition counseling.    Problem List Items Addressed This Visit        Endocrine and Metabolic    Graves' disease - Primary    Relevant Medications    methIMAzole (TAPAZOLE) 5 MG tablet    Morbid (severe) obesity due to excess calories (CMS/[facility])    Current Assessment & Plan     Patient states that she is here today to see if she can get a letter written stating that she is a high risk patient so that she can work from home per [employer] regulations.  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  They have the option to work from home but needs a letter for this.  She states that she has a history of autoimmune disease such as Graves' disease, anxiety, and a BMI of 35.  She states that all of these should qualify under the regulation that [employer] has set.  Patient case was sent to the nursing team for them to write a letter for her.  Patient is aware that we will call her with a letter is available.            Mental Health    Anxiety          Follow Up   Return if symptoms worsen or fail to improve.  Patient was given instructions and counseling regarding her condition or for health maintenance advice. Please see specific information pulled into the AVS if appropriate.

## 2021-01-12 ENCOUNTER — PATIENT MESSAGE (OUTPATIENT)
Dept: FAMILY MEDICINE CLINIC | Facility: CLINIC | Age: 51
End: 2021-01-12

## 2021-01-12 PROBLEM — E66.01 MORBID (SEVERE) OBESITY DUE TO EXCESS CALORIES: Status: ACTIVE | Noted: 2021-01-12

## 2021-01-12 NOTE — PATIENT INSTRUCTIONS
"BMI for Adults  What is BMI?  Body mass index (BMI) is a number that is calculated from a person's weight and height. BMI can help estimate how much of a person's weight is composed of fat. BMI does not measure body fat directly. Rather, it is an alternative to procedures that directly measure body fat, which can be difficult and expensive.  BMI can help identify people who may be at higher risk for certain medical problems.  What are BMI measurements used for?  BMI is used as a screening tool to identify possible weight problems. It helps determine whether a person is obese, overweight, a healthy weight, or underweight.  BMI is useful for:  · Identifying a weight problem that may be related to a medical condition or may increase the risk for medical problems.  · Promoting changes, such as changes in diet and exercise, to help reach a healthy weight. BMI screening can be repeated to see if these changes are working.  How is BMI calculated?  BMI involves measuring your weight in relation to your height. Both height and weight are measured, and the BMI is calculated from those numbers. This can be done either in English (U.S.) or metric measurements. Note that charts and online BMI calculators are available to help you find your BMI quickly and easily without having to do these calculations yourself.  To calculate your BMI in English (U.S.) measurements:    1. Measure your weight in pounds (lb).  2. Multiply the number of pounds by 703.  ? For example, for a person who weighs 180 lb, multiply that number by 703, which equals 126,540.  3. Measure your height in inches. Then multiply that number by itself to get a measurement called \"inches squared.\"  ? For example, for a person who is 70 inches tall, the \"inches squared\" measurement is 70 inches x 70 inches, which equals 4,900 inches squared.  4. Divide the total from step 2 (number of lb x 703) by the total from step 3 (inches squared): 126,540 ÷ 4,900 = 25.8. This is " "your BMI.  To calculate your BMI in metric measurements:  1. Measure your weight in kilograms (kg).  2. Measure your height in meters (m). Then multiply that number by itself to get a measurement called \"meters squared.\"  ? For example, for a person who is 1.75 m tall, the \"meters squared\" measurement is 1.75 m x 1.75 m, which is equal to 3.1 meters squared.  3. Divide the number of kilograms (your weight) by the meters squared number. In this example: 70 ÷ 3.1 = 22.6. This is your BMI.  What do the results mean?  BMI charts are used to identify whether you are underweight, normal weight, overweight, or obese. The following guidelines will be used:  · Underweight: BMI less than 18.5.  · Normal weight: BMI between 18.5 and 24.9.  · Overweight: BMI between 25 and 29.9.  · Obese: BMI of 30 or above.  Keep these notes in mind:  · Weight includes both fat and muscle, so someone with a muscular build, such as an athlete, may have a BMI that is higher than 24.9. In cases like these, BMI is not an accurate measure of body fat.  · To determine if excess body fat is the cause of a BMI of 25 or higher, further assessments may need to be done by a health care provider.  · BMI is usually interpreted in the same way for men and women.  Where to find more information  For more information about BMI, including tools to quickly calculate your BMI, go to these websites:  · Centers for Disease Control and Prevention: www.cdc.gov  · American Heart Association: www.heart.org  · National Heart, Lung, and Blood [place]: www.nhlbi.nih.gov  Summary  · Body mass index (BMI) is a number that is calculated from a person's weight and height.  · BMI may help estimate how much of a person's weight is composed of fat. BMI can help identify those who may be at higher risk for certain medical problems.  · BMI can be measured using English measurements or metric measurements.  · BMI charts are used to identify whether you are underweight, normal " weight, overweight, or obese.  This information is not intended to replace advice given to you by your health care provider. Make sure you discuss any questions you have with your health care provider.  Document Revised: 09/09/2020 Document Reviewed: 07/17/2020  Elsevier Patient Education © 2020 Elsevier Inc.

## 2021-01-12 NOTE — ASSESSMENT & PLAN NOTE
Patient states that she is here today to see if she can get a letter written stating that she is a high risk patient so that she can work from home per [employer] regulations.  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  They have the option to work from home but needs a letter for this.  She states that she has a history of autoimmune disease such as Graves' disease, anxiety, and a BMI of 35.  She states that all of these should qualify under the regulation that [employer] has set.  Patient case was sent to the nursing team for them to write a letter for her.  Patient is aware that we will call her with a letter is available.

## 2021-01-15 ENCOUNTER — TELEPHONE (OUTPATIENT)
Dept: FAMILY MEDICINE CLINIC | Facility: CLINIC | Age: 51
End: 2021-01-15

## 2021-01-15 NOTE — TELEPHONE ENCOUNTER
----- Message from SPENSER Zhong sent at 1/11/2021  4:27 PM CST -----  Regarding: Letter for high risk  Patient needs a letter stating that she is high risk due to morbid obesity ( BMI), Graves disease, and anxiety. She wants this letter so she can work from home.   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 Thanks Yanet

## 2021-02-09 ENCOUNTER — TELEPHONE (OUTPATIENT)
Dept: ENDOCRINOLOGY | Facility: CLINIC | Age: 51
End: 2021-02-09

## 2021-02-09 RX ORDER — METHIMAZOLE 5 MG/1
5 TABLET ORAL DAILY
Qty: 30 TABLET | Refills: 5 | Status: SHIPPED | OUTPATIENT
Start: 2021-02-09 | End: 2021-06-18

## 2021-02-09 NOTE — TELEPHONE ENCOUNTER
PT IS REQUESTING REFILLS OF methIMAzole (TAPAZOLE) 5 MG SENT INTO     [id number] - [place], [place] - [address] AT [facility] OF [address] - [phone]  - [phone] FX

## 2021-03-03 ENCOUNTER — OFFICE VISIT (OUTPATIENT)
Dept: FAMILY MEDICINE CLINIC | Facility: CLINIC | Age: 51
End: 2021-03-03

## 2021-03-03 VITALS
SYSTOLIC BLOOD PRESSURE: 131 MMHG | WEIGHT: 189 LBS | DIASTOLIC BLOOD PRESSURE: 76 MMHG | TEMPERATURE: 97.7 F | BODY MASS INDEX: 35.68 KG/M2 | HEART RATE: 66 BPM | HEIGHT: 61 IN

## 2021-03-03 DIAGNOSIS — M25.59 PAIN IN OTHER JOINT: ICD-10-CM

## 2021-03-03 DIAGNOSIS — Z51.81 MEDICATION MONITORING ENCOUNTER: ICD-10-CM

## 2021-03-03 DIAGNOSIS — T73.2XXA FATIGUE DUE TO EXPOSURE, INITIAL ENCOUNTER: Primary | ICD-10-CM

## 2021-03-03 DIAGNOSIS — L65.9 HAIR LOSS: ICD-10-CM

## 2021-03-03 DIAGNOSIS — F90.9 ADULT ATTENTION DEFICIT HYPERACTIVITY DISORDER: ICD-10-CM

## 2021-03-03 PROCEDURE — 99214 OFFICE O/P EST MOD 30 MIN: CPT | Performed by: NURSE PRACTITIONER

## 2021-03-03 NOTE — PROGRESS NOTES
"Chief Complaint  Weight Loss (patient concerned for weight gain. patient has tried and failed phentermine ), ADD (wants back on medication), and Alopecia (c/o of hair loss)    Subjective    History of Present Illness      Patient presents to [facility] for   Patient wanting to be seen for weight gain. She states that she needs a form filled out so that [employer] will pay for her weight watchers membership. She states that she is also concerned about her rapid hair loss.   Patient wishes to also restart her ADHD medication at this time. She was on phentermine in the past, but is no longer taking this medication.        Review of Systems   Constitutional: Positive for fatigue and unexpected weight gain.   HENT: Negative.    Eyes: Negative.    Respiratory: Negative.    Cardiovascular: Negative.    Gastrointestinal: Negative.    Endocrine: Negative.    Genitourinary: Negative.    Skin:        Hair loss   Allergic/Immunologic: Negative.    Neurological: Negative.    Hematological: Negative.    Psychiatric/Behavioral: Negative.    All other systems reviewed and are negative.      I have reviewed and agree with the HPI and ROS information as above.  Hortensia Abraham, APRN     Objective   Vital Signs:   /76   Pulse 66   Temp 97.7 °F (36.5 °C)   Ht 154.9 cm (61\")   Wt 85.7 kg (189 lb)   BMI 35.71 kg/m²       Physical Exam  Vitals signs and nursing note reviewed.   Constitutional:       Appearance: Normal appearance. She is well-developed. She is obese.   HENT:      Head: Normocephalic and atraumatic.      Right Ear: Tympanic membrane, ear canal and external ear normal.      Left Ear: Tympanic membrane, ear canal and external ear normal.      Nose: Nose normal. No septal deviation, nasal tenderness or congestion.      Mouth/Throat:      Lips: Pink. No lesions.      Mouth: Mucous membranes are moist. No oral lesions.      Dentition: Normal dentition.      Pharynx: Oropharynx is " clear. No pharyngeal swelling, oropharyngeal exudate or posterior oropharyngeal erythema.   Eyes:      General: Lids are normal. Vision grossly intact. No scleral icterus.        Right eye: No discharge.         Left eye: No discharge.      Extraocular Movements: Extraocular movements intact.      Conjunctiva/sclera: Conjunctivae normal.      Right eye: Right conjunctiva is not injected.      Left eye: Left conjunctiva is not injected.      Pupils: Pupils are equal, round, and reactive to light.   Neck:      Musculoskeletal: Full passive range of motion without pain, normal range of motion and neck supple.      Thyroid: No thyroid mass.      Trachea: Trachea normal.   Cardiovascular:      Rate and Rhythm: Normal rate and regular rhythm.      Heart sounds: Normal heart sounds. No murmur. No gallop.    Pulmonary:      Effort: Pulmonary effort is normal.      Breath sounds: Normal breath sounds and air entry. No wheezing, rhonchi or rales.   Musculoskeletal: Normal range of motion.         General: No tenderness or deformity.      Thoracic back: Normal.      Right lower leg: No edema.      Left lower leg: No edema.   Skin:     General: Skin is warm and dry.      Coloration: Skin is not jaundiced.      Findings: No rash.   Neurological:      Mental Status: She is alert and oriented to person, place, and time.      Cranial Nerves: Cranial nerves are intact.      Sensory: Sensation is intact.      Motor: Motor function is intact.      Coordination: Coordination is intact.      Gait: Gait is intact.      Deep Tendon Reflexes: Reflexes are normal and symmetric.   Psychiatric:         Mood and Affect: Mood and affect normal.         Behavior: Behavior normal.         Judgment: Judgment normal.                 Assessment and Plan    Patient's Body mass index is 35.71 kg/m². BMI is above normal parameters. Recommendations include: educational material, exercise counseling and nutrition counseling.    Problem List Items  Addressed This Visit        Mental Health    Adult attention deficit hyperactivity disorder    Current Assessment & Plan     Wishes to restart ADHD medication. Must have a clean UDS first and then the medication will be sent.            Other Visit Diagnoses     Fatigue due to exposure, initial encounter    -  Primary    Relevant Orders    TATI    Antistreptolysin O    CBC & Differential    Comprehensive Metabolic Panel    C-reactive Protein    Sedimentation Rate    Rheumatoid Factor    Cortisol    DHEA    Estradiol    FSH & LH    Progesterone    Testosterone    TSH    ACTH    Pain in other joint        Relevant Orders    TATI    Antistreptolysin O    CBC & Differential    Comprehensive Metabolic Panel    C-reactive Protein    Sedimentation Rate    Rheumatoid Factor    Cortisol    DHEA    Estradiol    FSH & LH    Progesterone    Testosterone    TSH    ACTH    Hair loss        Relevant Orders    TATI    Antistreptolysin O    CBC & Differential    Comprehensive Metabolic Panel    C-reactive Protein    Sedimentation Rate    Rheumatoid Factor    Cortisol    DHEA    Estradiol    FSH & LH    Progesterone    Testosterone    TSH    ACTH    Medication monitoring encounter        Relevant Orders    Urine Drug Screen - Urine, Clean Catch        Patient states that she is wanting to to do weight watchers and [employer] will pay for the membership if she has documentation. Patient is also concerned about hair loss. She wishes to have labs completed at this time. Patient states that she has a strong family hx of immunocompromised illness and is concerned she could have something. Will check inflammatory labs also at this time.     Follow Up   Return if symptoms worsen or fail to improve.  Patient was given instructions and counseling regarding her condition or for health maintenance advice. Please see specific information pulled into the AVS if appropriate.       Answers for HPI/ROS submitted by the patient on 3/3/2021   What is the  primary reason for your visit?: Other  Please describe your symptoms.: Weight concerns  Have you had these symptoms before?: Yes  How long have you been having these symptoms?: Greater than 2 weeks  Please list any medications you are currently taking for this condition.: N/A  Please describe any probable cause for these symptoms. : Thyroid and metabolism issues; would like to have my hormone levels checked

## 2021-03-03 NOTE — ASSESSMENT & PLAN NOTE
Wishes to restart ADHD medication. Must have a clean UDS first and then the medication will be sent.

## 2021-03-08 ENCOUNTER — TELEPHONE (OUTPATIENT)
Dept: FAMILY MEDICINE CLINIC | Facility: CLINIC | Age: 51
End: 2021-03-08

## 2021-03-08 ENCOUNTER — LAB (OUTPATIENT)
Dept: LAB | Facility: HOSPITAL | Age: 51
End: 2021-03-08

## 2021-03-08 ENCOUNTER — TRANSCRIBE ORDERS (OUTPATIENT)
Dept: ADMINISTRATIVE | Facility: HOSPITAL | Age: 51
End: 2021-03-08

## 2021-03-08 DIAGNOSIS — L65.9 HAIR LOSS: ICD-10-CM

## 2021-03-08 DIAGNOSIS — M25.59 PAIN IN OTHER JOINT: ICD-10-CM

## 2021-03-08 DIAGNOSIS — F90.9 ADULT ATTENTION DEFICIT HYPERACTIVITY DISORDER: Primary | ICD-10-CM

## 2021-03-08 DIAGNOSIS — T73.2XXA FATIGUE DUE TO EXPOSURE, INITIAL ENCOUNTER: ICD-10-CM

## 2021-03-08 DIAGNOSIS — T73.2XXA FATIGUE DUE TO EXPOSURE, INITIAL ENCOUNTER: Primary | ICD-10-CM

## 2021-03-08 DIAGNOSIS — Z51.81 MEDICATION MONITORING ENCOUNTER: ICD-10-CM

## 2021-03-08 LAB
AMPHET+METHAMPHET UR QL: NEGATIVE
AMPHETAMINES UR QL: NEGATIVE
BARBITURATES UR QL SCN: NEGATIVE
BENZODIAZ UR QL SCN: NEGATIVE
BUPRENORPHINE SERPL-MCNC: NEGATIVE NG/ML
CANNABINOIDS SERPL QL: NEGATIVE
COCAINE UR QL: NEGATIVE
METHADONE UR QL SCN: NEGATIVE
OPIATES UR QL: NEGATIVE
OXYCODONE UR QL SCN: NEGATIVE
PCP UR QL SCN: NEGATIVE
PROPOXYPH UR QL: NEGATIVE
TRICYCLICS UR QL SCN: NEGATIVE

## 2021-03-08 PROCEDURE — 80306 DRUG TEST PRSMV INSTRMNT: CPT

## 2021-03-08 NOTE — TELEPHONE ENCOUNTER
Caller: Nicolas Amaro    Relationship to patient: Self    Best call back number: [phone]     Patient is needing: Patient states that at her last visit she was told that she would be put back on Vyvnase  - she said that it was not sent to the pharmacy and she also needs the paperwork for the assistance program.     Please call to advise.

## 2021-03-09 ENCOUNTER — LAB (OUTPATIENT)
Dept: LAB | Facility: HOSPITAL | Age: 51
End: 2021-03-09

## 2021-03-09 ENCOUNTER — TELEPHONE (OUTPATIENT)
Dept: FAMILY MEDICINE CLINIC | Facility: CLINIC | Age: 51
End: 2021-03-09

## 2021-03-09 DIAGNOSIS — M25.59 PAIN IN OTHER JOINT: ICD-10-CM

## 2021-03-09 DIAGNOSIS — L65.9 HAIR LOSS: ICD-10-CM

## 2021-03-09 DIAGNOSIS — T73.2XXA FATIGUE DUE TO EXPOSURE, INITIAL ENCOUNTER: Primary | ICD-10-CM

## 2021-03-09 DIAGNOSIS — T73.2XXA FATIGUE DUE TO EXPOSURE, INITIAL ENCOUNTER: ICD-10-CM

## 2021-03-09 LAB
ALBUMIN SERPL-MCNC: 4.2 G/DL (ref 3.5–5)
ALBUMIN/GLOB SERPL: 1.2 G/DL (ref 1.1–2.5)
ALP SERPL-CCNC: 147 U/L (ref 24–120)
ALT SERPL W P-5'-P-CCNC: 27 U/L (ref 0–35)
ANION GAP SERPL CALCULATED.3IONS-SCNC: 9 MMOL/L (ref 4–13)
AST SERPL-CCNC: 24 U/L (ref 7–45)
AUTO MIXED CELLS #: 0.3 10*3/MM3 (ref 0.1–2.6)
AUTO MIXED CELLS %: 4.7 % (ref 0.1–24)
BILIRUB SERPL-MCNC: 0.4 MG/DL (ref 0.1–1)
BUN SERPL-MCNC: 20 MG/DL (ref 5–21)
BUN/CREAT SERPL: 23.5
CALCIUM SPEC-SCNC: 9.5 MG/DL (ref 8.4–10.4)
CHLORIDE SERPL-SCNC: 101 MMOL/L (ref 98–110)
CHROMATIN AB SERPL-ACNC: <10 IU/ML (ref 0–14)
CO2 SERPL-SCNC: 30 MMOL/L (ref 24–31)
CORTIS SERPL-MCNC: 7.48 MCG/DL
CREAT SERPL-MCNC: 0.85 MG/DL (ref 0.5–1.4)
CRP SERPL-MCNC: 0.96 MG/DL (ref 0–0.5)
ERYTHROCYTE [DISTWIDTH] IN BLOOD BY AUTOMATED COUNT: 14 % (ref 12.3–15.4)
ERYTHROCYTE [SEDIMENTATION RATE] IN BLOOD: 22 MM/HR (ref 0–20)
ESTRADIOL SERPL HS-MCNC: 29 PG/ML
FSH SERPL-ACNC: 34.7 MIU/ML
GFR SERPL CREATININE-BSD FRML MDRD: 71 ML/MIN/1.73
GLOBULIN UR ELPH-MCNC: 3.5 GM/DL
GLUCOSE SERPL-MCNC: 91 MG/DL (ref 70–100)
HCT VFR BLD AUTO: 38.6 % (ref 34–46.6)
HGB BLD-MCNC: 12.7 G/DL (ref 12–15.9)
LH SERPL-ACNC: 18.8 MIU/ML
LYMPHOCYTES # BLD AUTO: 1.5 10*3/MM3 (ref 0.7–3.1)
LYMPHOCYTES NFR BLD AUTO: 24.9 % (ref 19.6–45.3)
MCH RBC QN AUTO: 29.9 PG (ref 26.6–33)
MCHC RBC AUTO-ENTMCNC: 32.9 G/DL (ref 31.5–35.7)
MCV RBC AUTO: 90.8 FL (ref 79–97)
NEUTROPHILS NFR BLD AUTO: 4.4 10*3/MM3 (ref 1.7–7)
NEUTROPHILS NFR BLD AUTO: 70.4 % (ref 42.7–76)
PLATELET # BLD AUTO: 215 10*3/MM3 (ref 140–450)
PMV BLD AUTO: 10.1 FL (ref 6–12)
POTASSIUM SERPL-SCNC: 4.3 MMOL/L (ref 3.5–5.3)
PROGEST SERPL-MCNC: <0.05 NG/ML
PROT SERPL-MCNC: 7.7 G/DL (ref 6.3–8.7)
RBC # BLD AUTO: 4.25 10*6/MM3 (ref 3.77–5.28)
SODIUM SERPL-SCNC: 140 MMOL/L (ref 135–145)
TESTOST SERPL-MCNC: <2.5 NG/DL (ref 8.4–48.1)
TSH SERPL DL<=0.05 MIU/L-ACNC: 1.99 UIU/ML (ref 0.27–4.2)
WBC # BLD AUTO: 6.2 10*3/MM3 (ref 3.4–10.8)

## 2021-03-09 PROCEDURE — 83002 ASSAY OF GONADOTROPIN (LH): CPT

## 2021-03-09 PROCEDURE — 82626 DEHYDROEPIANDROSTERONE: CPT

## 2021-03-09 PROCEDURE — 82024 ASSAY OF ACTH: CPT

## 2021-03-09 PROCEDURE — 86038 ANTINUCLEAR ANTIBODIES: CPT | Performed by: NURSE PRACTITIONER

## 2021-03-09 PROCEDURE — 82670 ASSAY OF TOTAL ESTRADIOL: CPT

## 2021-03-09 PROCEDURE — 82533 TOTAL CORTISOL: CPT

## 2021-03-09 PROCEDURE — 86140 C-REACTIVE PROTEIN: CPT

## 2021-03-09 PROCEDURE — 83001 ASSAY OF GONADOTROPIN (FSH): CPT

## 2021-03-09 PROCEDURE — 84403 ASSAY OF TOTAL TESTOSTERONE: CPT

## 2021-03-09 PROCEDURE — 84144 ASSAY OF PROGESTERONE: CPT

## 2021-03-09 PROCEDURE — 36415 COLL VENOUS BLD VENIPUNCTURE: CPT | Performed by: NURSE PRACTITIONER

## 2021-03-09 PROCEDURE — 86431 RHEUMATOID FACTOR QUANT: CPT

## 2021-03-09 PROCEDURE — 86060 ANTISTREPTOLYSIN O TITER: CPT

## 2021-03-09 PROCEDURE — 85652 RBC SED RATE AUTOMATED: CPT

## 2021-03-09 PROCEDURE — 80050 GENERAL HEALTH PANEL: CPT

## 2021-03-09 NOTE — TELEPHONE ENCOUNTER
----- Message from SPENSER Zhong sent at 3/8/2021  5:23 PM CST -----  UDS normal. OK for Vyvanse 40  I will route to Sergey

## 2021-03-10 LAB
ACTH PLAS-MCNC: 22.4 PG/ML (ref 7.2–63.3)
ASO AB SERPL-ACNC: 25.9 IU/ML (ref 0–200)

## 2021-03-11 LAB — ANA TITR SER IF: NEGATIVE {TITER}

## 2021-03-15 ENCOUNTER — TELEPHONE (OUTPATIENT)
Dept: FAMILY MEDICINE CLINIC | Facility: CLINIC | Age: 51
End: 2021-03-15

## 2021-03-15 LAB — DHEA SERPL-MCNC: 97 NG/DL (ref 31–701)

## 2021-03-15 NOTE — TELEPHONE ENCOUNTER
----- Message from SPENSER Zhong sent at 3/15/2021  8:07 AM CDT -----  RA labs negative.   Testosterone is very low- will need to follow with GYN for management of hormones  Other labs ok

## 2021-03-15 NOTE — PROGRESS NOTES
RA labs negative.   Testosterone is very low- will need to follow with GYN for management of hormones  Other labs ok

## 2021-04-19 ENCOUNTER — TELEMEDICINE (OUTPATIENT)
Dept: ENDOCRINOLOGY | Facility: CLINIC | Age: 51
End: 2021-04-19

## 2021-04-19 DIAGNOSIS — E05.00 GRAVES' DISEASE: Primary | ICD-10-CM

## 2021-04-19 DIAGNOSIS — E05.90 HYPERTHYROIDISM: ICD-10-CM

## 2021-04-19 DIAGNOSIS — E53.8 B12 DEFICIENCY: ICD-10-CM

## 2021-04-19 PROCEDURE — 99214 OFFICE O/P EST MOD 30 MIN: CPT | Performed by: INTERNAL MEDICINE

## 2021-04-19 NOTE — PROGRESS NOTES
Nicolas Amaro is a 50 y.o. female who presents for  evaluation of   Graves' Disease                                      This was a Telehealth Encounter. Benefits and Disadvantages of a Telehealth Visit were discussed and accepted by patient. .  Patient agreed to receive service through Telehealth visit as patient is being compliant with social distancing recommendations imparted by CDC.     You have chosen to receive care through a telehealth visit.  Do you consent to use a video/audio connection for your medical care today? Yes        Referring provider  Primary Care Provider    Donny Rincon MD    Duration 10 years    Timing - Graves' Disease is constant    Started as Hypothyroidism , transitioned to hyperthyroidism     Quality -  Controlled on methimazole      Complications - Thyroid storm in the past    Current symptoms/problems  Weight gain      Objective:   There were no vitals taken for this visit.    Physical Exam   HENT:   Head: Normocephalic.   Pulmonary/Chest: Effort normal and breath sounds normal.   Abdominal: Normal appearance.   Musculoskeletal:      Right lower leg: No edema.      Left lower leg: No edema.   Neurological: She is alert.       Lab Results   Component Value Date    WBC 6.20 03/09/2021    HGB 12.7 03/09/2021    HCT 38.6 03/09/2021    MCV 90.8 03/09/2021     03/09/2021     Lab Results   Component Value Date    GLUCOSE 91 03/09/2021    BUN 20 03/09/2021    CREATININE 0.85 03/09/2021    EGFRIFNONA 71 03/09/2021    BCR 23.5 03/09/2021    K 4.3 03/09/2021    CO2 30.0 03/09/2021    CALCIUM 9.5 03/09/2021    ALBUMIN 4.20 03/09/2021    AST 24 03/09/2021    ALT 27 03/09/2021           Assessment/Plan       ICD-10-CM ICD-9-CM   1. Graves' disease  E05.00 242.00   2. B12 deficiency  E53.8 266.2   3. Hyperthyroidism  E05.90 242.90       Lab Results   Component Value Date    TSH 1.990 03/09/2021    TSH 1.540 06/04/2020    TSH 2.870 03/02/2020       Lab Results   Component Value Date     FREET4 1.44 06/04/2020    FREET4 1.29 03/02/2020    FREET4 0.96 02/05/2020       Lab Results   Component Value Date    E1ASBYI 96 12/15/2017    R8TRLFW 92 06/21/2017    G4VIOHU 416.0 (H) 05/05/2017       Thyroid Antibodies  TPO AB  Lab Results   Component Value Date    THYROIDAB 215 (H) 10/10/2020       Thyroid Stimulating Immunoglobulin    Lab Results   Component Value Date    LABTHYR 262 (H) 12/15/2017         Methimazole now on 5 mg daily and working    atenolol 25 mg daily - may use PRN for palpitations        Assess      psoariasis     Topical steroid    Kenalog injection, before     Arthralgias,     We ruled out celiac and sle in May 2017    --    Obesity , phentermine in addition to lifestyle changes   Now on vyvanse      --    Low iron    Taking ferrous sulfate, taking big , or add vit C     Obtain celiac     --    Perimenopause     I reviewed and summarized records from Donny Rincon MD from 2020  and I reviewed / ordered labs.     No orders of the defined types were placed in this encounter.        A copy of my note was sent to Donny Rincon MD    Please see my above opinion and suggestions.     I spent 15 minutes reviewing patient electronic chart , reviewing medications , past history , active problems.   I provided advice regarding management of medical conditions, refilled prescriptions , ordered labs and arranged for future appointment.   Patient was advised to contact us if there were any unanswered questions or ongoing concerns.

## 2021-05-19 DIAGNOSIS — F90.9 ADULT ATTENTION DEFICIT HYPERACTIVITY DISORDER: ICD-10-CM

## 2021-05-19 NOTE — TELEPHONE ENCOUNTER
Caller: Nicolas Amaro    Relationship: Self    Best call back number: [phone]    Medication needed:   Requested Prescriptions     Pending Prescriptions Disp Refills   • lisdexamfetamine (Vyvanse) 40 MG capsule 30 capsule 0     Sig: Take 1 capsule by mouth Every Morning       When do you need the refill by: ASAP    What additional details did the patient provide when requesting the medication: MEDICATION REFILL  Does the patient have less than a 3 day supply:  [x] Yes  [] No    What is the patient's preferred pharmacy: [facility] #[id number] [place], [place] - [address] AT [facility] OF [address] [facility] [address] - [phone]  - [phone] FX

## 2021-06-16 DIAGNOSIS — F90.9 ADULT ATTENTION DEFICIT HYPERACTIVITY DISORDER: ICD-10-CM

## 2021-06-16 NOTE — TELEPHONE ENCOUNTER
Caller: Prem Nicolas ADITYA    Relationship: Self    Best call back number: [phone]    Medication needed:   Requested Prescriptions     Pending Prescriptions Disp Refills   • lisdexamfetamine (Vyvanse) 40 MG capsule 30 capsule 0     Sig: Take 1 capsule by mouth Every Morning       When do you need the refill by: ASAP    What additional details did the patient provide when requesting the medication: 2 DAYS OF MEDICATION LEFT     Does the patient have less than a 3 day supply:  [x] Yes  [] No    What is the patient's preferred pharmacy: [facility] #[id number] [place], [place] - [address] AT [facility] [address] [facility] [address] - [phone]  - [phone] FX

## 2021-06-18 ENCOUNTER — OFFICE VISIT (OUTPATIENT)
Dept: FAMILY MEDICINE CLINIC | Facility: CLINIC | Age: 51
End: 2021-06-18

## 2021-06-18 VITALS
RESPIRATION RATE: 20 BRPM | BODY MASS INDEX: 35.12 KG/M2 | TEMPERATURE: 96.7 F | DIASTOLIC BLOOD PRESSURE: 70 MMHG | SYSTOLIC BLOOD PRESSURE: 110 MMHG | WEIGHT: 186 LBS | HEART RATE: 70 BPM | HEIGHT: 61 IN

## 2021-06-18 DIAGNOSIS — F90.9 ADULT ATTENTION DEFICIT HYPERACTIVITY DISORDER: Primary | ICD-10-CM

## 2021-06-18 DIAGNOSIS — R45.4 IRRITABILITY: ICD-10-CM

## 2021-06-18 PROCEDURE — 99214 OFFICE O/P EST MOD 30 MIN: CPT | Performed by: NURSE PRACTITIONER

## 2021-06-18 RX ORDER — LAMOTRIGINE 25 MG/1
TABLET ORAL
Qty: 42 TABLET | Refills: 0 | Status: SHIPPED | OUTPATIENT
Start: 2021-06-18 | End: 2021-07-15

## 2021-06-18 RX ORDER — METHIMAZOLE 5 MG/1
5 TABLET ORAL 3 TIMES DAILY
COMMUNITY
End: 2021-08-23

## 2021-06-18 NOTE — PROGRESS NOTES
"Chief Complaint  ADHD    Subjective    History of Present Illness      Patient presents to [facility] for   Pt states that she is here for a f/u with ADHD and is doing well with medication and the dose. Pt states that she is focusing well, has an adequate appetite and sleeps well at night. Admits to some irritability.       ADHD/Mood HPI    Visit for:  follow-up. Most recent visit was 3 months ago.  Interim changes to follow up on today: no change in medication  Work/School Performance:  going well  Cognitive:  able to focus    Behavior  Hyperactivity: is not hyperactive  Impulsivity: no impulsivity  Tasking: able to mult-task    Social  ADHD social/impulsive symptoms:  not impatient    Behavioral health  Behavior: no concerns  Emotional coping: irritable          Review of Systems   Constitutional: Negative.    HENT: Negative.    Eyes: Negative.    Respiratory: Negative.    Cardiovascular: Negative.    Gastrointestinal: Negative.    Endocrine: Negative.    Genitourinary: Negative.    Musculoskeletal: Negative.    Skin: Negative.    Allergic/Immunologic: Negative.    Neurological: Negative.    Hematological: Negative.    Psychiatric/Behavioral: Positive for agitation.       I have reviewed and agree with the HPI and ROS information as above.  Rhianna Humphrey, SPENSER     Objective   Vital Signs:   /70   Pulse 70   Temp 96.7 °F (35.9 °C)   Resp 20   Ht 154.9 cm (61\")   Wt 84.4 kg (186 lb)   BMI 35.14 kg/m²       Physical Exam  Constitutional:       Appearance: She is well-developed. She is obese.   HENT:      Head: Normocephalic and atraumatic.      Right Ear: Tympanic membrane, ear canal and external ear normal.      Left Ear: Tympanic membrane, ear canal and external ear normal.      Nose: Nose normal. No septal deviation, nasal tenderness or congestion.      Mouth/Throat:      Lips: Pink. No lesions.      Mouth: Mucous membranes are moist. No oral lesions.      Dentition: " Normal dentition.      Pharynx: Oropharynx is clear. No pharyngeal swelling, oropharyngeal exudate or posterior oropharyngeal erythema.   Eyes:      General: Lids are normal. Vision grossly intact. No scleral icterus.        Right eye: No discharge.         Left eye: No discharge.      Extraocular Movements: Extraocular movements intact.      Conjunctiva/sclera: Conjunctivae normal.      Right eye: Right conjunctiva is not injected.      Left eye: Left conjunctiva is not injected.      Pupils: Pupils are equal, round, and reactive to light.   Neck:      Thyroid: No thyroid mass.      Trachea: Trachea normal.   Cardiovascular:      Rate and Rhythm: Normal rate and regular rhythm.      Heart sounds: Normal heart sounds. No murmur heard.   No gallop.    Pulmonary:      Effort: Pulmonary effort is normal.      Breath sounds: Normal breath sounds and air entry. No wheezing, rhonchi or rales.   Abdominal:      General: There is no distension.      Palpations: Abdomen is soft. There is no mass.      Tenderness: There is no abdominal tenderness. There is no right CVA tenderness, left CVA tenderness, guarding or rebound.   Musculoskeletal:         General: No tenderness or deformity. Normal range of motion.      Cervical back: Full passive range of motion without pain, normal range of motion and neck supple.      Thoracic back: Normal.      Right lower leg: No edema.      Left lower leg: No edema.   Skin:     General: Skin is warm and dry.      Coloration: Skin is not jaundiced.      Findings: No rash.   Neurological:      Mental Status: She is alert and oriented to person, place, and time.      Cranial Nerves: Cranial nerves are intact.      Sensory: Sensation is intact.      Motor: Motor function is intact.      Coordination: Coordination is intact.      Gait: Gait is intact.      Deep Tendon Reflexes: Reflexes are normal and symmetric.   Psychiatric:         Mood and Affect: Mood and affect normal.         Judgment:  Judgment normal.          Result Review  Data Reviewed:          Urine Drug Screen - Urine, Clean Catch (03/08/2021 16:49)           Assessment and Plan      Problem List Items Addressed This Visit        Mental Health    Adult attention deficit hyperactivity disorder - Primary      Other Visit Diagnoses     Irritability        Relevant Medications    lamoTRIgine (LaMICtal) 25 MG tablet      Continue vyvanse, uds is utd and satisfactory. We will additionally start lamotrigine to help with irritability and moodiness that she expresses today. Denies MORETZA RIVERO. Recheck her in 1 month with new medication addition.       The following information was discussed with patient/caregiver at the time of the appointment.    Lamictal (lamotrigine):  Rarely, serious (sometimes fatal) skin rashes have occurred while taking this medication. These rashes are more common in children under 16 than in adults. Rashes may be more likely if you start at too high a dose, if you increase your dose too quickly, or if you take this medication with certain other anti-seizure medications (valproic acid, divalproex). These rashes may occur anytime during use, but most serious rashes have occurred within 2 to 8 weeks of starting lamotrigine. Get medical help right away if you develop any type of skin rash while taking this medication, or if you have other signs of a serious allergic reaction such as hives, fever, swollen lymph glands, painful sores in the mouth or around the eyes, or swelling of the lips or tongue. Your doctor will tell you if you should stop taking lamotrigine. Even after you stop taking this medication, it is still possible for the rash to become life-threatening or cause permanent scars or other problems.    Uses    Lamotrigine is used alone or with other medications to prevent and control seizures. It may also be used to help prevent the extreme mood swings of bipolar disorder in adults and children. Lamotrigine is known as an  anticonvulsant or antiepileptic drug. It is thought to work by restoring the balance of certain natural substances in the brain.    How to Use    Read the Medication Guide and, if available, the Patient Information Leaflet provided by your pharmacist before you start taking lamotrigine and each time you get a refill. If you have any questions, ask your doctor or pharmacist. Take this medication by mouth with or without food as directed by your doctor. Swallow the tablets whole since chewing them may leave a bitter taste. Dosage is based on your medical condition, response to treatment, and use of certain interacting drugs. (See also Drug Interactions section.) For children, the dosage is also based on weight. It is very important to follow your doctor's dosing instructions exactly. The dose must be increased slowly. It may take several weeks or months to reach the best dose for you and to get the full benefit from this medication. Take this medication regularly in order to get the most benefit from it. To help you remember, take it at the same time(s) each day. Do not stop taking this medication without consulting your doctor. Some conditions may become worse when the drug is suddenly stopped. Your dose may need to be gradually decreased. Also, if you have stopped taking this medication, do not restart lamotrigine without consulting your doctor. Tell your doctor if your condition does not improve or if it worsens.    Side Effects    See also Warning section. Dizziness, drowsiness, headache, blurred/double vision, loss of coordination, shaking (tremor), nausea, vomiting, or upset stomach may occur. If any of these effects persist or worsen, tell your doctor or pharmacist promptly. Remember that your doctor has prescribed this medication because he or she has judged that the benefit to you is greater than the risk of side effects. Many people using this medication do not have serious side effects. A small number of  people who take anticonvulsants for any condition (such as seizures, bipolar disorder, pain) may experience depression, suicidal thoughts/attempts, or other mental/mood problems. Tell your doctor right away if you or your family/caregiver notice any unusual/sudden changes in your mood, thoughts, or behavior including signs of depression, suicidal thoughts/attempts, thoughts about harming yourself. Tell your doctor right away if any of these rare but serious side effects occur: fainting, easy or unusual bruising/bleeding, unusual tiredness, signs of infection (such as fever, stiff neck, persistent sore throat), muscle pain/tenderness/weakness, dark urine, yellowing eyes/skin, stomach/abdominal pain, persistent nausea/vomiting, change in the amount of urine. A very serious allergic reaction to this drug is rare. However, get medical help right away if you notice any symptoms of a serious allergic reaction, including: rash, itching/swelling (especially of the face/tongue/throat), severe dizziness, trouble breathing. This is not a complete list of possible side effects. If you notice other effects not listed above, contact your doctor or pharmacist. In the [place] - Call your doctor for medical advice about side effects. You may report side effects to FDA at 7-681-UWW-5793. In [place] - Call your doctor for medical advice about side effects. You may report side effects to [facility] [place] at [phone].    Precautions    Before taking lamotrigine, tell your doctor or pharmacist if you are allergic to it; or if you have any other allergies. This product may contain inactive ingredients, which can cause allergic reactions or other problems. Talk to your pharmacist for more details. Before using this medication, tell your doctor or pharmacist your medical history, especially of: kidney disease, liver disease. This drug may make you dizzy or drowsy or cause blurred vision. Do not drive, use machinery, or do any activity that  requires alertness or clear vision until you are sure you can perform such activities safely. Limit alcoholic beverages. Before having surgery, tell your doctor or dentist about all the products you use (including prescription drugs, nonprescription drugs, and herbal products). Older adults may be more sensitive to the side effects of this drug, especially dizziness, loss of coordination, or fainting. These side effects can increase the risk of falling. During pregnancy, this medication should be used only when clearly needed. It may harm an unborn baby. However, since untreated seizures or mental/mood problems (such as bipolar disorder) are serious conditions that can harm both a pregnant woman and her unborn baby, do not stop taking this medication unless directed by your doctor. If you are planning pregnancy, become pregnant, or think you may be pregnant, immediately talk to your doctor about the benefits and risks of using this medication during pregnancy. Since birth control pills, patches, implants, and injections may not work if taken with this medication (see also Drug Interactions section), discuss reliable forms of birth control with your doctor. This drug passes into breast milk and may have undesirable effects on a nursing infant. Consult your doctor before breast-feeding.    Drug Interaction    Drug interactions may change how your medications work or increase your risk for serious side effects. This document does not contain all possible drug interactions. Keep a list of all the products you use (including prescription/nonprescription drugs and herbal products) and share it with your doctor and pharmacist. Do not start, stop, or change the dosage of any medicines without your doctor's approval. Other medications can affect the removal of lamotrigine from your body, which may affect how lamotrigine works. Examples include hormonal birth control (such as pills, patches), estrogens, other medications to  treat seizures (such as carbamazepine, phenobarbital, phenytoin, primidone, valproic acid), certain HIV protease inhibitors (such as lopinavir/ritonavir, atazanavir/ritonavir), and rifampin, among others. Your doctor may need to adjust your dose of lamotrigine if you are on these medications. This medication may decrease the effectiveness of hormonal birth control products (such as pills, patch, ring). This effect can result in pregnancy. Ask your doctor or pharmacist for details. Discuss whether you should use additional reliable birth control methods while using this medication. Also tell your doctor if you have any new spotting or breakthrough bleeding, because these may be signs that your birth control is not working well. Tell your doctor or pharmacist if you are taking other products that cause drowsiness including alcohol, antihistamines (such as cetirizine, diphenhydramine), drugs for sleep or anxiety (such as alprazolam, diazepam, zolpidem), muscle relaxants, and narcotic pain relievers (such as codeine). Check the labels on all your medicines (such as allergy or cough-and-cold products) because they may contain ingredients that cause drowsiness. Ask your pharmacist about using those products safely. This medication may interfere with certain laboratory tests (including urine drug screening tests), possibly causing false test results. Make sure laboratory personnel and all your doctors know you use this drug.    Overdose    If overdose is suspected, contact a poison control center or emergency room immediately. [place] residents can call their local poison control center at [phone]. [place] residents can call a provincial poison control center. Symptoms of overdose may include: severe drowsiness, unusual eye movements, loss of consciousness.    Notes    Do not share this medication with others. Laboratory and/or medical tests (such as liver and kidney function tests, complete blood count) may be performed  periodically to monitor your progress or check for side effects. Consult your doctor for more details. There are different types of this medication available. Some do not have the same effects. There are also some medications that sound the same as this product. Make sure you have the right product before taking it.    Missed Dose    It is important to take each dose at the scheduled time. If you miss a dose, take it as soon as you remember. If it is near the time of the next dose, skip the missed dose and resume your usual dosing schedule. Do not double the dose to catch up.    Storage    Store at room temperature away from light and moisture. Do not store in the bathroom. Keep all medications away from children and pets. Do not flush medications down the toilet or pour them into a drain unless instructed to do so. Properly discard this product when it is  or no longer needed. Consult your pharmacist or local waste disposal company.    Medical Alert    Your condition can cause complications in a medical emergency. For information about enrolling in MedicAlert, call 1-129.430.7218 ([place]) or [phone] ([place]).    Disclaimer    IMPORTANT: HOW TO USE THIS INFORMATION: This is a summary and does NOT have all possible information about this product. This information does not assure that this product is safe, effective, or appropriate for you. This information is not individual medical advice and does not substitute for the advice of your health care professional. Always ask your health care professional for complete information about this product and your specific health needs.    Source  [employer].        Follow Up   Return in about 1 month (around 2021).  Patient was given instructions and counseling regarding her condition or for health maintenance advice. Please see specific information pulled into the AVS if appropriate.

## 2021-06-18 NOTE — PATIENT INSTRUCTIONS

## 2021-06-21 ENCOUNTER — TELEPHONE (OUTPATIENT)
Dept: FAMILY MEDICINE CLINIC | Facility: CLINIC | Age: 51
End: 2021-06-21

## 2021-06-21 NOTE — TELEPHONE ENCOUNTER
----- Message from Nicolas Amaro sent at 6/21/2021 11:28 AM CDT -----  Regarding: Visit Follow-Up Question  Contact: [phone]  Hi Rhianna, I've decided not to start the lamotrigine rx, I don't feel it's right for me. I had shared tht I've been a little more emotional than usual lately, but more of a sadness vs irritability. I don't feel tht I have mood swings at all, my issue is tht I take on the feelings of others tht I care about. If they're stressed/upset, I tend to take tht on, as well. But I've always been tht way. I don't hv any sort of mood disorder symptoms. My being more emotional recently has more to do w/hormones & being pre-menopausal. But I've always been a highly sensitive person w/high emotional awareness of others. It's not accurate to have mood disorder as a part of my health record, so I need this to be corrected in the visit notes/summary & removed.

## 2021-07-07 ENCOUNTER — TELEMEDICINE (OUTPATIENT)
Dept: FAMILY MEDICINE CLINIC | Facility: CLINIC | Age: 51
End: 2021-07-07

## 2021-07-07 VITALS — BODY MASS INDEX: 35.12 KG/M2 | HEIGHT: 61 IN | WEIGHT: 186 LBS

## 2021-07-07 DIAGNOSIS — J06.9 UPPER RESPIRATORY TRACT INFECTION, UNSPECIFIED TYPE: ICD-10-CM

## 2021-07-07 DIAGNOSIS — J02.9 ACUTE PHARYNGITIS, UNSPECIFIED ETIOLOGY: Primary | ICD-10-CM

## 2021-07-07 PROCEDURE — 99213 OFFICE O/P EST LOW 20 MIN: CPT | Performed by: NURSE PRACTITIONER

## 2021-07-07 RX ORDER — METHYLPREDNISOLONE 4 MG/1
TABLET ORAL
Qty: 21 TABLET | Refills: 0 | Status: SHIPPED | OUTPATIENT
Start: 2021-07-07 | End: 2021-08-10

## 2021-07-07 RX ORDER — AZITHROMYCIN 250 MG/1
TABLET, FILM COATED ORAL
Qty: 6 TABLET | Refills: 0 | Status: SHIPPED | OUTPATIENT
Start: 2021-07-07 | End: 2021-08-10

## 2021-07-07 NOTE — PROGRESS NOTES
"You have chosen to receive care through a telehealth visit.  Do you consent to use a video/audio connection for your medical care today? Yes      Chief Complaint  Cough    Subjective    History of Present Illness      Patient presents to [facility] for   Pt complains of cough and sore throat that started Friday or Saturday.        Review of Systems   Constitutional: Negative for appetite change, diaphoresis, fatigue and fever.   HENT: Positive for sore throat. Negative for ear pain, hearing loss, mouth sores, sinus pressure, sneezing and voice change.    Eyes: Negative for discharge, itching and visual disturbance.   Respiratory: Positive for cough. Negative for shortness of breath and wheezing.    Cardiovascular: Negative for chest pain and palpitations.   Gastrointestinal: Negative for abdominal pain, diarrhea and vomiting.   Musculoskeletal: Negative for arthralgias, back pain and myalgias.   Skin: Negative for rash.   Neurological: Negative for dizziness, seizures, weakness, numbness and headache.   Hematological: Negative for adenopathy. Does not bruise/bleed easily.   Psychiatric/Behavioral: Negative for agitation, dysphoric mood, sleep disturbance and depressed mood. The patient is not nervous/anxious.        I have reviewed and agree with the HPI and ROS information as above.  Emelina Puente, APRN     Objective   Vital Signs:   Ht 154.9 cm (61\")   Wt 84.4 kg (186 lb)   BMI 35.14 kg/m²       Physical Exam  Constitutional:       Appearance: Normal appearance. She is well-developed.   HENT:      Head: Normocephalic and atraumatic.      Nose: No congestion.      Mouth/Throat:      Lips: Pink. No lesions.   Eyes:      General: Lids are normal. Vision grossly intact.      Conjunctiva/sclera: Conjunctivae normal.      Right eye: Right conjunctiva is not injected.      Left eye: Left conjunctiva is not injected.   Pulmonary:      Effort: Pulmonary effort is normal. "   Musculoskeletal:         General: Normal range of motion.      Cervical back: Full passive range of motion without pain, normal range of motion and neck supple.   Skin:     General: Skin is warm and dry.   Neurological:      Mental Status: She is alert and oriented to person, place, and time.      Motor: Motor function is intact.   Psychiatric:         Mood and Affect: Mood and affect normal.         Judgment: Judgment normal.          Result Review  Data Reviewed:                   Assessment and Plan      Problem List Items Addressed This Visit     None      Visit Diagnoses     Acute pharyngitis, unspecified etiology    -  Primary    Upper respiratory tract infection, unspecified type        Relevant Medications    azithromycin (Zithromax Z-Edison) 250 MG tablet    methylPREDNISolone (MEDROL) 4 MG dose pack      Patient is seen today for a sick visit over telehealth.  She complains of starting with a sore throat and a mild cough on Friday or Saturday.  The cough has now become more significant and she is coughing up stuff from her chest.  Patient denies any fever.  Patient did have the Covid vaccine and the last one was in February.  Patient declines Covid testing.  Will treat as above.  Patient to return with continuing or worsening symptoms.        Follow Up   Return if symptoms worsen or fail to improve.  Patient was given instructions and counseling regarding her condition or for health maintenance advice. Please see specific information pulled into the AVS if appropriate.

## 2021-07-14 DIAGNOSIS — F90.9 ADULT ATTENTION DEFICIT HYPERACTIVITY DISORDER: ICD-10-CM

## 2021-07-14 NOTE — TELEPHONE ENCOUNTER
Caller: Nicolas Amaro    Relationship: Self    Best call back number: [phone]    Medication needed:   Requested Prescriptions     Pending Prescriptions Disp Refills   • lisdexamfetamine (Vyvanse) 40 MG capsule 30 capsule 0     Sig: Take 1 capsule by mouth Every Morning     Does the patient have less than a 3 day supply:  [x] Yes  [] No    What is the patient's preferred pharmacy: [facility] #[id number] [address] AT [facility] [address] [facility] [address] - [phone]  - [phone] FX

## 2021-07-15 DIAGNOSIS — F90.9 ADULT ATTENTION DEFICIT HYPERACTIVITY DISORDER: ICD-10-CM

## 2021-08-10 PROCEDURE — U0004 COV-19 TEST NON-CDC HGH THRU: HCPCS | Performed by: NURSE PRACTITIONER

## 2021-08-19 DIAGNOSIS — F90.9 ADULT ATTENTION DEFICIT HYPERACTIVITY DISORDER: ICD-10-CM

## 2021-08-19 NOTE — TELEPHONE ENCOUNTER
Caller: Nicolas Amaro    Relationship: Self    Best call back number: [phone]    Medication needed:   Requested Prescriptions     Pending Prescriptions Disp Refills   • lisdexamfetamine (Vyvanse) 40 MG capsule 30 capsule 0     Sig: Take 1 capsule by mouth Every Morning       What is the patient's preferred pharmacy: [facility] #[id number] [place], [place] - [address] AT [facility] [address] - [phone]  - [phone] FX

## 2021-08-23 RX ORDER — METHIMAZOLE 5 MG/1
TABLET ORAL
Qty: 30 TABLET | Refills: 5 | Status: SHIPPED | OUTPATIENT
Start: 2021-08-23 | End: 2021-10-28

## 2021-09-20 DIAGNOSIS — F90.9 ADULT ATTENTION DEFICIT HYPERACTIVITY DISORDER: ICD-10-CM

## 2021-09-20 NOTE — TELEPHONE ENCOUNTER
Caller: Nicolas Amaro    Relationship: Self    Best call back number: [phone]    Medication needed:   Requested Prescriptions     Pending Prescriptions Disp Refills   • lisdexamfetamine (Vyvanse) 40 MG capsule 30 capsule 0     Sig: Take 1 capsule by mouth Every Morning       When do you need the refill by: Tuesday         Does the patient have less than a 3 day supply:  [] Yes  [x] No    What is the patient's preferred pharmacy: [facility] #[id number] [facility] [address] AT [facility] [address] - [phone]  - [phone] FX

## 2021-09-27 ENCOUNTER — OFFICE VISIT (OUTPATIENT)
Dept: FAMILY MEDICINE CLINIC | Facility: CLINIC | Age: 51
End: 2021-09-27

## 2021-09-27 VITALS
WEIGHT: 184 LBS | HEART RATE: 65 BPM | BODY MASS INDEX: 34.74 KG/M2 | DIASTOLIC BLOOD PRESSURE: 78 MMHG | HEIGHT: 61 IN | RESPIRATION RATE: 20 BRPM | SYSTOLIC BLOOD PRESSURE: 124 MMHG | TEMPERATURE: 96.8 F

## 2021-09-27 DIAGNOSIS — F90.9 ADULT ATTENTION DEFICIT HYPERACTIVITY DISORDER: Primary | ICD-10-CM

## 2021-09-27 DIAGNOSIS — E66.9 CLASS 1 OBESITY WITH BODY MASS INDEX (BMI) OF 34.0 TO 34.9 IN ADULT, UNSPECIFIED OBESITY TYPE, UNSPECIFIED WHETHER SERIOUS COMORBIDITY PRESENT: ICD-10-CM

## 2021-09-27 DIAGNOSIS — Z11.52 ENCOUNTER FOR SCREENING FOR COVID-19: ICD-10-CM

## 2021-09-27 PROCEDURE — 99213 OFFICE O/P EST LOW 20 MIN: CPT | Performed by: NURSE PRACTITIONER

## 2021-09-27 NOTE — PROGRESS NOTES
"Chief Complaint  f/u ADHD    Subjective    History of Present Illness      Patient presents to [facility] for   Pt states that she is here for a f/u with ADHD and is doing well with medication and the dose. Pt states that she is focusing well, has an adequate appetite and sleeps good at night.       ADHD/Mood HPI    Visit for:  follow-up. Most recent visit was 3 months ago.  Interim changes to follow up on today: no change in medication  Work/School Performance:  going well  Cognitive:  able to focus    Behavior  Hyperactivity: is not hyperactive  Impulsivity: no impulsivity  Tasking: able to mult-task    Social  ADHD social/impulsive symptoms:  not impatient    Behavioral health  Behavior: no concerns  Emotional coping: demonstrates feelings of no concerns       Review of Systems   Constitutional: Negative.    HENT: Negative.    Eyes: Negative.    Respiratory: Negative.    Cardiovascular: Negative.    Gastrointestinal: Negative.    Endocrine: Negative.    Genitourinary: Negative.    Musculoskeletal: Negative.    Skin: Negative.    Allergic/Immunologic: Negative.    Neurological: Negative.    Hematological: Negative.    Psychiatric/Behavioral: Negative.        I have reviewed and agree with the HPI and ROS information as above.  Hortensia Vieira, SPENSER     Objective   Vital Signs:   /78   Pulse 65   Temp 96.8 °F (36 °C)   Resp 20   Ht 154.9 cm (61\")   Wt 83.5 kg (184 lb)   BMI 34.77 kg/m²       Physical Exam  Constitutional:       Appearance: Normal appearance. She is well-developed. She is obese.   HENT:      Head: Normocephalic and atraumatic.      Right Ear: External ear normal.      Left Ear: External ear normal.      Nose: Nose normal. No nasal tenderness or congestion.      Mouth/Throat:      Lips: Pink. No lesions.      Mouth: Mucous membranes are moist. No oral lesions.      Dentition: Normal dentition.      Pharynx: Oropharynx is clear. No pharyngeal swelling, " oropharyngeal exudate or posterior oropharyngeal erythema.   Eyes:      General: Lids are normal. Vision grossly intact. No scleral icterus.        Right eye: No discharge.         Left eye: No discharge.      Extraocular Movements: Extraocular movements intact.      Conjunctiva/sclera: Conjunctivae normal.      Right eye: Right conjunctiva is not injected.      Left eye: Left conjunctiva is not injected.      Pupils: Pupils are equal, round, and reactive to light.   Cardiovascular:      Rate and Rhythm: Normal rate and regular rhythm.      Heart sounds: Normal heart sounds. No murmur heard.   No gallop.    Pulmonary:      Effort: Pulmonary effort is normal.      Breath sounds: Normal breath sounds and air entry. No wheezing, rhonchi or rales.   Musculoskeletal:         General: No tenderness or deformity. Normal range of motion.      Cervical back: Full passive range of motion without pain, normal range of motion and neck supple.      Right lower leg: No edema.      Left lower leg: No edema.   Skin:     General: Skin is warm and dry.      Coloration: Skin is not jaundiced.      Findings: No rash.   Neurological:      Mental Status: She is alert and oriented to person, place, and time.      Cranial Nerves: Cranial nerves are intact.      Sensory: Sensation is intact.      Motor: Motor function is intact.      Coordination: Coordination is intact.      Gait: Gait is intact.   Psychiatric:         Attention and Perception: Attention normal.         Mood and Affect: Mood and affect normal.         Behavior: Behavior is not hyperactive. Behavior is cooperative.         Thought Content: Thought content normal.         Judgment: Judgment normal.          Result Review  Data Reviewed:   The following data was reviewed by: SPENSER Deleon on 09/27/2021:    Urine Drug Screen - Urine, Clean Catch (03/08/2021 16:49)           Assessment and Plan      Problem List Items Addressed This Visit        Mental Health    Adult  attention deficit hyperactivity disorder - Primary      Other Visit Diagnoses     Class 1 obesity with body mass index (BMI) of 34.0 to 34.9 in adult, unspecified obesity type, unspecified whether serious comorbidity present        Encounter for screening for COVID-19        Relevant Orders    SARS-CoV-2 Semi-Quant Total Ab          Patient here today for 3-month ADHD follow-up.  She is doing well with her current dose of Vyvanse.  She feels that her focus is stable.  Wishes to continue the same at this time.  She also is requesting to have her antibodies for Covid checked.  She has not had Covid in the past, but has been vaccinated.  She is wanting to see what these antibodies show before getting the booster vaccine.    Plan:    1.  Continue same dose of Vyvanse 40 mg.  Freedom is pending.  UDS is up-to-date and appropriate.  2.  Labs ordered for Covid antibodies.  3.  Follow-up 3 months.    Follow Up   Return in about 3 months (around 12/27/2021) for Recheck.  Patient was given instructions and counseling regarding her condition or for health maintenance advice. Please see specific information pulled into the AVS if appropriate.     ADHD Follow up:    Freedom report initiated in office and pending. ADRS (Adult ADHD Assessment Form) reviewed in detail, scanned in chart, and has been reviewed at time of appointment.  All questions, including medication and side effects, were discussed in detail at time of patient's visit. Patient will continue same medication which was discussed at today's visit. Patient is to return in 3 month(s).       Yes

## 2021-09-28 PROCEDURE — U0004 COV-19 TEST NON-CDC HGH THRU: HCPCS | Performed by: NURSE PRACTITIONER

## 2021-10-25 ENCOUNTER — LAB (OUTPATIENT)
Dept: LAB | Facility: HOSPITAL | Age: 51
End: 2021-10-25

## 2021-10-25 ENCOUNTER — TELEMEDICINE (OUTPATIENT)
Dept: ENDOCRINOLOGY | Facility: CLINIC | Age: 51
End: 2021-10-25

## 2021-10-25 DIAGNOSIS — E53.8 B12 DEFICIENCY: ICD-10-CM

## 2021-10-25 DIAGNOSIS — E06.3 HASHIMOTO'S THYROIDITIS: ICD-10-CM

## 2021-10-25 DIAGNOSIS — E05.90 HYPERTHYROIDISM: ICD-10-CM

## 2021-10-25 DIAGNOSIS — E05.00 GRAVES' DISEASE: Primary | ICD-10-CM

## 2021-10-25 DIAGNOSIS — R74.01 TRANSAMINITIS: ICD-10-CM

## 2021-10-25 DIAGNOSIS — E55.9 VITAMIN D DEFICIENCY: ICD-10-CM

## 2021-10-25 DIAGNOSIS — T73.2XXA FATIGUE DUE TO EXPOSURE, INITIAL ENCOUNTER: Primary | ICD-10-CM

## 2021-10-25 LAB
ALBUMIN SERPL-MCNC: 4.4 G/DL (ref 3.5–5.2)
ALBUMIN/GLOB SERPL: 1.6 G/DL
ALP SERPL-CCNC: 160 U/L (ref 39–117)
ALT SERPL W P-5'-P-CCNC: 75 U/L (ref 1–33)
ANION GAP SERPL CALCULATED.3IONS-SCNC: 7.7 MMOL/L (ref 5–15)
AST SERPL-CCNC: 64 U/L (ref 1–32)
BASOPHILS # BLD AUTO: 0.04 10*3/MM3 (ref 0–0.2)
BASOPHILS NFR BLD AUTO: 0.7 % (ref 0–1.5)
BILIRUB SERPL-MCNC: 0.2 MG/DL (ref 0–1.2)
BUN SERPL-MCNC: 10 MG/DL (ref 6–20)
BUN/CREAT SERPL: 13 (ref 7–25)
CALCIUM SPEC-SCNC: 9.4 MG/DL (ref 8.6–10.5)
CHLORIDE SERPL-SCNC: 102 MMOL/L (ref 98–107)
CO2 SERPL-SCNC: 29.3 MMOL/L (ref 22–29)
CREAT SERPL-MCNC: 0.77 MG/DL (ref 0.57–1)
DEPRECATED RDW RBC AUTO: 45.4 FL (ref 37–54)
EOSINOPHIL # BLD AUTO: 0.04 10*3/MM3 (ref 0–0.4)
EOSINOPHIL NFR BLD AUTO: 0.7 % (ref 0.3–6.2)
ERYTHROCYTE [DISTWIDTH] IN BLOOD BY AUTOMATED COUNT: 13.5 % (ref 12.3–15.4)
GFR SERPL CREATININE-BSD FRML MDRD: 79 ML/MIN/1.73
GLOBULIN UR ELPH-MCNC: 2.8 GM/DL
GLUCOSE SERPL-MCNC: 91 MG/DL (ref 65–99)
HCT VFR BLD AUTO: 37.1 % (ref 34–46.6)
HGB BLD-MCNC: 12.5 G/DL (ref 12–15.9)
IMM GRANULOCYTES # BLD AUTO: 0.01 10*3/MM3 (ref 0–0.05)
IMM GRANULOCYTES NFR BLD AUTO: 0.2 % (ref 0–0.5)
LYMPHOCYTES # BLD AUTO: 1.43 10*3/MM3 (ref 0.7–3.1)
LYMPHOCYTES NFR BLD AUTO: 23.8 % (ref 19.6–45.3)
MCH RBC QN AUTO: 30.7 PG (ref 26.6–33)
MCHC RBC AUTO-ENTMCNC: 33.7 G/DL (ref 31.5–35.7)
MCV RBC AUTO: 91.2 FL (ref 79–97)
MONOCYTES # BLD AUTO: 0.27 10*3/MM3 (ref 0.1–0.9)
MONOCYTES NFR BLD AUTO: 4.5 % (ref 5–12)
NEUTROPHILS NFR BLD AUTO: 4.21 10*3/MM3 (ref 1.7–7)
NEUTROPHILS NFR BLD AUTO: 70.1 % (ref 42.7–76)
NRBC BLD AUTO-RTO: 0 /100 WBC (ref 0–0.2)
PLATELET # BLD AUTO: 210 10*3/MM3 (ref 140–450)
PMV BLD AUTO: 11.7 FL (ref 6–12)
POTASSIUM SERPL-SCNC: 4.3 MMOL/L (ref 3.5–5.2)
PROT SERPL-MCNC: 7.2 G/DL (ref 6–8.5)
RBC # BLD AUTO: 4.07 10*6/MM3 (ref 3.77–5.28)
SODIUM SERPL-SCNC: 139 MMOL/L (ref 136–145)
T4 FREE SERPL-MCNC: 1.24 NG/DL (ref 0.93–1.7)
TSH SERPL DL<=0.05 MIU/L-ACNC: 1.61 UIU/ML (ref 0.27–4.2)
WBC # BLD AUTO: 6 10*3/MM3 (ref 3.4–10.8)

## 2021-10-25 PROCEDURE — 84439 ASSAY OF FREE THYROXINE: CPT | Performed by: INTERNAL MEDICINE

## 2021-10-25 PROCEDURE — 80050 GENERAL HEALTH PANEL: CPT | Performed by: INTERNAL MEDICINE

## 2021-10-25 PROCEDURE — 86769 SARS-COV-2 COVID-19 ANTIBODY: CPT | Performed by: NURSE PRACTITIONER

## 2021-10-25 PROCEDURE — 86376 MICROSOMAL ANTIBODY EACH: CPT | Performed by: INTERNAL MEDICINE

## 2021-10-25 PROCEDURE — 99214 OFFICE O/P EST MOD 30 MIN: CPT | Performed by: INTERNAL MEDICINE

## 2021-10-25 PROCEDURE — 84445 ASSAY OF TSI GLOBULIN: CPT | Performed by: INTERNAL MEDICINE

## 2021-10-25 PROCEDURE — 36415 COLL VENOUS BLD VENIPUNCTURE: CPT | Performed by: INTERNAL MEDICINE

## 2021-10-26 DIAGNOSIS — E55.9 VITAMIN D DEFICIENCY: Primary | ICD-10-CM

## 2021-10-26 DIAGNOSIS — E53.8 B12 DEFICIENCY: ICD-10-CM

## 2021-10-26 LAB
SARS-COV-2 AB SERPL IA-ACNC: 1362 U/ML
SARS-COV-2 AB SERPL-IMP: POSITIVE
THYROPEROXIDASE AB SERPL-ACNC: 249 IU/ML (ref 0–34)

## 2021-10-27 LAB — TSI SER-ACNC: 0.3 IU/L (ref 0–0.55)

## 2021-10-27 NOTE — PROGRESS NOTES
Nicolas Amaro is a 51 y.o. female who presents for  evaluation of   Graves' Disease                                      This was a Telehealth Encounter. Benefits and Disadvantages of a Telehealth Visit were discussed and accepted by patient. .  Patient agreed to receive service through Telehealth visit as patient is being compliant with social distancing recommendations imparted by CDC.     You have chosen to receive care through a telehealth visit.  Do you consent to use a video/audio connection for your medical care today? Yes        Referring provider  Primary Care Provider    Donny Rincon MD    Duration 10 years    Timing - Graves' Disease is constant    Started as Hypothyroidism , transitioned to hyperthyroidism     Quality -  Controlled on methimazole      Complications - Thyroid storm in the past    Current symptoms/problems  Weight gain      Objective:   There were no vitals taken for this visit.    Physical Exam   HENT:   Head: Normocephalic.   Pulmonary/Chest: Effort normal and breath sounds normal.   Abdominal: Normal appearance.   Musculoskeletal:      Right lower leg: No edema.      Left lower leg: No edema.   Neurological: She is alert.         Lab Results   Component Value Date    WBC 6.00 10/25/2021    HGB 12.5 10/25/2021    HCT 37.1 10/25/2021    MCV 91.2 10/25/2021     10/25/2021     Lab Results   Component Value Date    GLUCOSE 91 10/25/2021    BUN 10 10/25/2021    CREATININE 0.77 10/25/2021    EGFRIFNONA 79 10/25/2021    BCR 13.0 10/25/2021    K 4.3 10/25/2021    CO2 29.3 (H) 10/25/2021    CALCIUM 9.4 10/25/2021    ALBUMIN 4.40 10/25/2021    AST 64 (H) 10/25/2021    ALT 75 (H) 10/25/2021     Thyroid Workup    Lab Results   Component Value Date    TSH 1.610 10/25/2021    TSH 1.990 03/09/2021    TSH 1.540 06/04/2020       Lab Results   Component Value Date    FREET4 1.24 10/25/2021    FREET4 1.44 06/04/2020    FREET4 1.29 03/02/2020       Lab Results   Component Value Date    T3FREE  2.65 06/04/2020    T3FREE 8.8 (H) 09/05/2019    T3FREE 8.5 (H) 03/06/2019       TPO antibodies    Lab Results   Component Value Date    THYROIDAB 249 (H) 10/25/2021    THYROIDAB 215 (H) 10/10/2020    THYROIDAB 128 (H) 06/04/2020       TSI antibodies    Lab Results   Component Value Date    THYRSTIMIMMU 0.30 10/25/2021    THYRSTIMIMMU 0.52 10/10/2020    THYRSTIMIMMU 0.79 (H) 06/04/2020                 Assessment/Plan       ICD-10-CM ICD-9-CM   1. Graves' disease  E05.00 242.00   2. Hyperthyroidism  E05.90 242.90   3. Hashimoto's thyroiditis  E06.3 245.2   4. Vitamin D deficiency  E55.9 268.9   5. B12 deficiency  E53.8 266.2   6. Transaminitis  R74.01 790.4         Methimazole now on 5 mg daily    Has missed doses   TSI neg   TPO positive     Stop methimazole and reevaluate     atenolol 25 mg daily - may use PRN for palpitations        Assess      psoariasis     Topical steroid    Kenalog injection, before     Arthralgias,     We ruled out celiac and sle in May 2017    --    Obesity , phentermine in addition to lifestyle changes , stopped   Now on vyvanse      --    Low iron    Taking ferrous sulfate, taking big , or add vit C     Obtain celiac     --    Perimenopause     ---    Vit D Def     50 [address] u monthly     --    Transaminitis    Most likely fatty liver, weight loss

## 2021-10-28 RX ORDER — CHOLECALCIFEROL (VITAMIN D3) 1250 MCG
50000 CAPSULE ORAL
Qty: 1 CAPSULE | Refills: 11 | Status: SHIPPED | OUTPATIENT
Start: 2021-10-28 | End: 2022-06-07 | Stop reason: SDUPTHER

## 2021-11-19 DIAGNOSIS — F90.9 ADULT ATTENTION DEFICIT HYPERACTIVITY DISORDER: ICD-10-CM

## 2021-11-19 NOTE — TELEPHONE ENCOUNTER
Caller: Prem Nicolas ADITYA    Relationship: Self    Best call back number: [phone]    Requested Prescriptions:   Requested Prescriptions     Pending Prescriptions Disp Refills   • lisdexamfetamine (Vyvanse) 40 MG capsule 30 capsule 0     Sig: Take 1 capsule by mouth Every Morning for 30 days        Pharmacy where request should be sent: [id number] - [id number] AT [facility] [address] - [phone]  - [phone] FX     Does the patient have less than a 3 day supply:  [] Yes  [x] No    Valerie Ray Rep   11/19/21 14:05 CST

## 2022-01-04 PROCEDURE — U0004 COV-19 TEST NON-CDC HGH THRU: HCPCS | Performed by: NURSE PRACTITIONER

## 2022-02-04 ENCOUNTER — TELEMEDICINE (OUTPATIENT)
Dept: ENDOCRINOLOGY | Facility: CLINIC | Age: 52
End: 2022-02-04

## 2022-02-04 DIAGNOSIS — E05.00 GRAVES' DISEASE: ICD-10-CM

## 2022-02-04 DIAGNOSIS — E55.9 VITAMIN D DEFICIENCY: ICD-10-CM

## 2022-02-04 DIAGNOSIS — E06.3 HASHIMOTO'S THYROIDITIS: Primary | ICD-10-CM

## 2022-02-04 DIAGNOSIS — E53.8 B12 DEFICIENCY: ICD-10-CM

## 2022-02-04 PROCEDURE — 99214 OFFICE O/P EST MOD 30 MIN: CPT | Performed by: INTERNAL MEDICINE

## 2022-02-04 NOTE — PROGRESS NOTES
Nicolas Amaro is a 51 y.o. female who presents for  evaluation of   Graves' Disease                                      This was a Telehealth Encounter. Benefits and Disadvantages of a Telehealth Visit were discussed and accepted by patient. .  Patient agreed to receive service through Telehealth visit as patient is being compliant with social distancing recommendations imparted by CDC.     You have chosen to receive care through a telehealth visit.  Do you consent to use a video/audio connection for your medical care today? Yes        Referring provider  Primary Care Provider    Donny Rincon MD    Duration 10 years    Timing - Graves' Disease is constant    Started as Hypothyroidism , transitioned to hyperthyroidism     Quality -  Controlled on methimazole in the past     Complications - Thyroid storm in the past    Current symptoms/problems  Weight gain now stable     Objective:   There were no vitals taken for this visit.    Physical Exam   HENT:   Head: Normocephalic.   Pulmonary/Chest: Effort normal and breath sounds normal.   Abdominal: Normal appearance.   Musculoskeletal:      Right lower leg: No edema.      Left lower leg: No edema.   Neurological: She is alert.         Lab Results   Component Value Date    WBC 6.00 10/25/2021    HGB 12.5 10/25/2021    HCT 37.1 10/25/2021    MCV 91.2 10/25/2021     10/25/2021     Lab Results   Component Value Date    GLUCOSE 91 10/25/2021    BUN 10 10/25/2021    CREATININE 0.77 10/25/2021    EGFRIFNONA 79 10/25/2021    BCR 13.0 10/25/2021    K 4.3 10/25/2021    CO2 29.3 (H) 10/25/2021    CALCIUM 9.4 10/25/2021    ALBUMIN 4.40 10/25/2021    AST 64 (H) 10/25/2021    ALT 75 (H) 10/25/2021     Thyroid Workup    Lab Results   Component Value Date    TSH 1.610 10/25/2021    TSH 1.990 03/09/2021    TSH 1.540 06/04/2020       Lab Results   Component Value Date    FREET4 1.24 10/25/2021    FREET4 1.44 06/04/2020    FREET4 1.29 03/02/2020       Lab Results   Component  Value Date    T3FREE 2.65 06/04/2020    T3FREE 8.8 (H) 09/05/2019    T3FREE 8.5 (H) 03/06/2019       TPO antibodies    Lab Results   Component Value Date    THYROIDAB 249 (H) 10/25/2021    THYROIDAB 215 (H) 10/10/2020    THYROIDAB 128 (H) 06/04/2020       TSI antibodies    Lab Results   Component Value Date    THYRSTIMIMMU 0.30 10/25/2021    THYRSTIMIMMU 0.52 10/10/2020    THYRSTIMIMMU 0.79 (H) 06/04/2020                 Assessment/Plan       ICD-10-CM ICD-9-CM   1. Hashimoto's thyroiditis  E06.3 245.2   2. Graves' disease  E05.00 242.00   3. Vitamin D deficiency  E55.9 268.9   4. B12 deficiency  E53.8 266.2         Methimazole now on 5 mg daily  In the past now stopped and in remissions     TSI neg   TPO positive    atenolol 25 mg daily - may use PRN for palpitations        Assess      psoariasis     Topical steroid    Kenalog injection, before     Arthralgias,     We ruled out celiac and sle in May 2017    --    Obesity , phentermine in addition to lifestyle changes , stopped   Now on vyvanse      --    Low iron    Taking ferrous sulfate, taking big , or add vit C     Obtain celiac     --    Perimenopause     ---    Vit D Def     50 [address] u monthly     --    Transaminitis    Most likely fatty liver, weight loss

## 2022-02-07 ENCOUNTER — LAB (OUTPATIENT)
Dept: LAB | Facility: HOSPITAL | Age: 52
End: 2022-02-07

## 2022-02-07 LAB
ALBUMIN SERPL-MCNC: 4.3 G/DL (ref 3.5–5)
ALBUMIN/GLOB SERPL: 1.3 G/DL (ref 1.1–2.5)
ALP SERPL-CCNC: 148 U/L (ref 24–120)
ALT SERPL W P-5'-P-CCNC: 39 U/L (ref 0–35)
ANION GAP SERPL CALCULATED.3IONS-SCNC: 4 MMOL/L (ref 4–13)
AST SERPL-CCNC: 34 U/L (ref 7–45)
AUTO MIXED CELLS #: 0.4 10*3/MM3 (ref 0.1–2.6)
AUTO MIXED CELLS %: 5.3 % (ref 0.1–24)
BILIRUB SERPL-MCNC: 0.5 MG/DL (ref 0.1–1)
BUN SERPL-MCNC: 15 MG/DL (ref 5–21)
BUN/CREAT SERPL: 19.7
CALCIUM SPEC-SCNC: 9.6 MG/DL (ref 8.4–10.4)
CHLORIDE SERPL-SCNC: 102 MMOL/L (ref 98–110)
CO2 SERPL-SCNC: 30 MMOL/L (ref 24–31)
CREAT SERPL-MCNC: 0.76 MG/DL (ref 0.5–1.4)
ERYTHROCYTE [DISTWIDTH] IN BLOOD BY AUTOMATED COUNT: 13.6 % (ref 12.3–15.4)
GFR SERPL CREATININE-BSD FRML MDRD: 81 ML/MIN/1.73
GLOBULIN UR ELPH-MCNC: 3.2 GM/DL
GLUCOSE SERPL-MCNC: 100 MG/DL (ref 70–100)
HCT VFR BLD AUTO: 39.2 % (ref 34–46.6)
HGB BLD-MCNC: 12.7 G/DL (ref 12–15.9)
LYMPHOCYTES # BLD AUTO: 2.1 10*3/MM3 (ref 0.7–3.1)
LYMPHOCYTES NFR BLD AUTO: 28.3 % (ref 19.6–45.3)
MCH RBC QN AUTO: 29.3 PG (ref 26.6–33)
MCHC RBC AUTO-ENTMCNC: 32.4 G/DL (ref 31.5–35.7)
MCV RBC AUTO: 90.5 FL (ref 79–97)
NEUTROPHILS NFR BLD AUTO: 4.8 10*3/MM3 (ref 1.7–7)
NEUTROPHILS NFR BLD AUTO: 66.4 % (ref 42.7–76)
PLATELET # BLD AUTO: 210 10*3/MM3 (ref 140–450)
PMV BLD AUTO: 10.7 FL (ref 6–12)
POTASSIUM SERPL-SCNC: 4.2 MMOL/L (ref 3.5–5.3)
PROT SERPL-MCNC: 7.5 G/DL (ref 6.3–8.7)
RBC # BLD AUTO: 4.33 10*6/MM3 (ref 3.77–5.28)
SODIUM SERPL-SCNC: 136 MMOL/L (ref 135–145)
WBC NRBC COR # BLD: 7.3 10*3/MM3 (ref 3.4–10.8)

## 2022-02-07 PROCEDURE — 84445 ASSAY OF TSI GLOBULIN: CPT | Performed by: INTERNAL MEDICINE

## 2022-02-07 PROCEDURE — 82306 VITAMIN D 25 HYDROXY: CPT | Performed by: INTERNAL MEDICINE

## 2022-02-07 PROCEDURE — 82607 VITAMIN B-12: CPT | Performed by: INTERNAL MEDICINE

## 2022-02-07 PROCEDURE — 80053 COMPREHEN METABOLIC PANEL: CPT | Performed by: INTERNAL MEDICINE

## 2022-02-07 PROCEDURE — 84439 ASSAY OF FREE THYROXINE: CPT | Performed by: INTERNAL MEDICINE

## 2022-02-07 PROCEDURE — 80050 GENERAL HEALTH PANEL: CPT | Performed by: INTERNAL MEDICINE

## 2022-02-07 PROCEDURE — 85025 COMPLETE CBC W/AUTO DIFF WBC: CPT | Performed by: INTERNAL MEDICINE

## 2022-02-07 PROCEDURE — 84481 FREE ASSAY (FT-3): CPT | Performed by: INTERNAL MEDICINE

## 2022-02-07 PROCEDURE — 36415 COLL VENOUS BLD VENIPUNCTURE: CPT | Performed by: INTERNAL MEDICINE

## 2022-02-07 PROCEDURE — 86376 MICROSOMAL ANTIBODY EACH: CPT | Performed by: INTERNAL MEDICINE

## 2022-02-08 LAB
25(OH)D3 SERPL-MCNC: 18.1 NG/ML (ref 30–100)
T3FREE SERPL-MCNC: 3.13 PG/ML (ref 2–4.4)
T4 FREE SERPL-MCNC: 1.24 NG/DL (ref 0.93–1.7)
TSH SERPL DL<=0.05 MIU/L-ACNC: 0.83 UIU/ML (ref 0.27–4.2)
VIT B12 BLD-MCNC: 383 PG/ML (ref 211–946)

## 2022-02-09 LAB — THYROPEROXIDASE AB SERPL-ACNC: 403 IU/ML (ref 0–34)

## 2022-02-11 LAB — TSI SER-ACNC: 0.35 IU/L (ref 0–0.55)

## 2022-02-17 DIAGNOSIS — E55.9 VITAMIN D DEFICIENCY: ICD-10-CM

## 2022-02-17 DIAGNOSIS — E06.3 HASHIMOTO'S THYROIDITIS: Primary | ICD-10-CM

## 2022-02-17 DIAGNOSIS — E53.8 B12 DEFICIENCY: ICD-10-CM

## 2022-03-19 ENCOUNTER — LAB (OUTPATIENT)
Dept: LAB | Facility: HOSPITAL | Age: 52
End: 2022-03-19

## 2022-03-19 ENCOUNTER — TELEPHONE (OUTPATIENT)
Dept: FAMILY MEDICINE CLINIC | Facility: CLINIC | Age: 52
End: 2022-03-19

## 2022-03-19 ENCOUNTER — OFFICE VISIT (OUTPATIENT)
Dept: FAMILY MEDICINE CLINIC | Facility: CLINIC | Age: 52
End: 2022-03-19

## 2022-03-19 VITALS
SYSTOLIC BLOOD PRESSURE: 148 MMHG | BODY MASS INDEX: 36.63 KG/M2 | HEIGHT: 61 IN | DIASTOLIC BLOOD PRESSURE: 88 MMHG | RESPIRATION RATE: 20 BRPM | WEIGHT: 194 LBS | HEART RATE: 86 BPM

## 2022-03-19 DIAGNOSIS — F90.9 ADULT ATTENTION DEFICIT HYPERACTIVITY DISORDER: Primary | ICD-10-CM

## 2022-03-19 DIAGNOSIS — Z51.81 MEDICATION MONITORING ENCOUNTER: ICD-10-CM

## 2022-03-19 DIAGNOSIS — E66.9 CLASS 2 OBESITY WITH BODY MASS INDEX (BMI) OF 36.0 TO 36.9 IN ADULT, UNSPECIFIED OBESITY TYPE, UNSPECIFIED WHETHER SERIOUS COMORBIDITY PRESENT: ICD-10-CM

## 2022-03-19 DIAGNOSIS — R22.1 NECK MASS: ICD-10-CM

## 2022-03-19 PROBLEM — E66.812 CLASS 2 OBESITY WITH BODY MASS INDEX (BMI) OF 36.0 TO 36.9 IN ADULT: Status: ACTIVE | Noted: 2021-01-12

## 2022-03-19 LAB
AMPHET+METHAMPHET UR QL: POSITIVE
AMPHETAMINES UR QL: NEGATIVE
BARBITURATES UR QL SCN: NEGATIVE
BENZODIAZ UR QL SCN: NEGATIVE
BUPRENORPHINE SERPL-MCNC: NEGATIVE NG/ML
CANNABINOIDS SERPL QL: NEGATIVE
COCAINE UR QL: NEGATIVE
METHADONE UR QL SCN: NEGATIVE
OPIATES UR QL: NEGATIVE
OXYCODONE UR QL SCN: NEGATIVE
PCP UR QL SCN: NEGATIVE
PROPOXYPH UR QL: NEGATIVE
TRICYCLICS UR QL SCN: NEGATIVE

## 2022-03-19 PROCEDURE — 80306 DRUG TEST PRSMV INSTRMNT: CPT

## 2022-03-19 PROCEDURE — 99214 OFFICE O/P EST MOD 30 MIN: CPT | Performed by: NURSE PRACTITIONER

## 2022-03-19 NOTE — PROGRESS NOTES
"Chief Complaint  Med Refill and ADD    Subjective    History of Present Illness      Patient presents to [facility] for   States she has been out of her meds for awhile. She feels that she might need dose increase and wants to discuss.     ADD         Review of Systems   Constitutional: Negative.    HENT: Negative.    Eyes: Negative.    Respiratory: Negative.    Cardiovascular: Negative.    Gastrointestinal: Negative.    Endocrine: Negative.    Genitourinary: Negative.    Musculoskeletal: Negative.    Skin: Negative.    Allergic/Immunologic: Negative.    Neurological: Negative.    Hematological: Negative.    Psychiatric/Behavioral: Negative.        I have reviewed and agree with the HPI and ROS information as above.  Hortensia Vieira, APRN     Objective   Vital Signs:   /88   Pulse 86   Resp 20   Ht 154.9 cm (61\")   Wt 88 kg (194 lb)   BMI 36.66 kg/m²     Patient's Body mass index is 36.66 kg/m². indicating that she is obese (BMI >30). Obesity-related health conditions include the following: none. Obesity is unchanged. BMI is is above average; BMI management plan is completed. We discussed low calorie, low carb based diet program, portion control and increasing exercise..      Physical Exam  Constitutional:       Appearance: Normal appearance. She is well-developed. She is obese.   HENT:      Head: Normocephalic and atraumatic.      Right Ear: External ear normal.      Left Ear: External ear normal.      Nose: Nose normal. No nasal tenderness or congestion.      Mouth/Throat:      Lips: Pink. No lesions.      Mouth: Mucous membranes are moist. No oral lesions.      Dentition: Normal dentition.      Pharynx: Oropharynx is clear. No pharyngeal swelling, oropharyngeal exudate or posterior oropharyngeal erythema.   Eyes:      General: Lids are normal. Vision grossly intact. No scleral icterus.        Right eye: No discharge.         Left eye: No discharge.      Extraocular " Movements: Extraocular movements intact.      Conjunctiva/sclera: Conjunctivae normal.      Right eye: Right conjunctiva is not injected.      Left eye: Left conjunctiva is not injected.      Pupils: Pupils are equal, round, and reactive to light.   Neck:      Comments: Neck mass noted on right side  Cardiovascular:      Rate and Rhythm: Normal rate and regular rhythm.      Heart sounds: Normal heart sounds. No murmur heard.    No gallop.   Pulmonary:      Effort: Pulmonary effort is normal.      Breath sounds: Normal breath sounds and air entry. No wheezing, rhonchi or rales.   Musculoskeletal:         General: No tenderness or deformity. Normal range of motion.      Cervical back: Full passive range of motion without pain, normal range of motion and neck supple.      Right lower leg: No edema.      Left lower leg: No edema.   Skin:     General: Skin is warm and dry.      Coloration: Skin is not jaundiced.      Findings: No rash.   Neurological:      Mental Status: She is alert and oriented to person, place, and time.      Cranial Nerves: Cranial nerves are intact.      Sensory: Sensation is intact.      Motor: Motor function is intact.      Coordination: Coordination is intact.      Gait: Gait is intact.   Psychiatric:         Attention and Perception: Attention normal.         Mood and Affect: Mood and affect normal.         Behavior: Behavior is not hyperactive. Behavior is cooperative.         Thought Content: Thought content normal.         Judgment: Judgment normal.          Result Review  Data Reviewed:   The following data was reviewed by: SPENSER Deleon on 03/19/2022:    Urine Drug Screen - Urine, Clean Catch (03/08/2021 16:49)           Assessment and Plan      Problem List Items Addressed This Visit        Endocrine and Metabolic    Class 2 obesity with body mass index (BMI) of 36.0 to 36.9 in adult       Mental Health    Adult attention deficit hyperactivity disorder - Primary      Other Visit  Diagnoses     Neck mass        Relevant Orders    US Thyroid    Medication monitoring encounter        Relevant Orders    Urine Drug Screen - Urine, Clean Catch        Here on Saturday.  Patient is here today requesting to restart Vyvanse.  She has been off of Vyvanse for her the past couple of months.  She is questioning if she can increase to the 50 mg versus the 40.  She has tried a 50 mg in the past.  However, this did cause insomnia issues and irritability.    She also states she can feel something on the right side of her neck.  She does have a history of thyroid issues and sees a endocrinologist for this.  She states she noticed her neck was bigger on the right side about 2 weeks ago.  On exam the right side of her neck is enlarged.     Patient does have concerns about trouble losing weight.  She is not sure if this is related to her thyroid issues as well.  She does admit to not following a healthy lifestyle as well.    Plan:    1.  Discussed options with patient regarding dosage.  She would like to stay at 40 mg at this time and may consider increasing to 50 mg in the future.  She is due for urine drug screen at this time.  She did admit to taking Vyvanse recently which may reflect on her drug screen.  Freedom is clean.  2.  Thyroid ultrasound ordered at this time.  We will make further recommendations when these results are available.  Instructed patient to notify her endocrinologist on Monday regarding this as well.  3.  Discussed weight loss options with patient such as Metformin and Wellbutrin.  Recommended to follow low-carb keto lifestyle, increase activity, and avoid sugary drinks and sodas.  Encouraged to increase water intake.  She would like to try lifestyle changes first and to discuss her neck issues with her endocrinologist first.  If no improvement with lifestyle changes that her follow-up will consider starting medication.  4.  Follow-up 1 month.      Follow Up   Return in about 1 month  (around 4/19/2022) for Recheck.  Patient was given instructions and counseling regarding her condition or for health maintenance advice. Please see specific information pulled into the AVS if appropriate.     ADHD Follow up:    Freedom report initiated in office and is clean. ADRS (Adult ADHD Assessment Form) reviewed in detail, scanned in chart, and has been reviewed at time of appointment.  All questions, including medication and side effects, were discussed in detail at time of patient's visit. Patient will continue same medication which was discussed at today's visit. Patient is to return in 1 month(s).

## 2022-03-19 NOTE — TELEPHONE ENCOUNTER
----- Message from SPENSER Garcia sent at 3/19/2022 12:11 PM CDT -----  Will send in vyvanse 40    Contacted pt, verified name and . Informed of above. Pt vu of all.

## 2022-03-21 DIAGNOSIS — R22.1 NECK MASS: Primary | ICD-10-CM

## 2022-03-25 ENCOUNTER — HOSPITAL ENCOUNTER (OUTPATIENT)
Dept: ULTRASOUND IMAGING | Facility: HOSPITAL | Age: 52
Discharge: HOME | End: 2022-03-25
Admitting: INTERNAL MEDICINE

## 2022-03-25 PROCEDURE — 76536 US EXAM OF HEAD AND NECK: CPT

## 2022-04-05 ENCOUNTER — LAB (OUTPATIENT)
Dept: LAB | Facility: HOSPITAL | Age: 52
End: 2022-04-05

## 2022-04-05 ENCOUNTER — TELEPHONE (OUTPATIENT)
Dept: FAMILY MEDICINE CLINIC | Facility: CLINIC | Age: 52
End: 2022-04-05

## 2022-04-05 ENCOUNTER — TELEMEDICINE (OUTPATIENT)
Dept: FAMILY MEDICINE CLINIC | Facility: CLINIC | Age: 52
End: 2022-04-05

## 2022-04-05 VITALS — BODY MASS INDEX: 36.63 KG/M2 | WEIGHT: 194 LBS | HEIGHT: 61 IN

## 2022-04-05 DIAGNOSIS — Z20.822 SUSPECTED COVID-19 VIRUS INFECTION: ICD-10-CM

## 2022-04-05 DIAGNOSIS — E66.9 CLASS 2 OBESITY WITH BODY MASS INDEX (BMI) OF 36.0 TO 36.9 IN ADULT, UNSPECIFIED OBESITY TYPE, UNSPECIFIED WHETHER SERIOUS COMORBIDITY PRESENT: ICD-10-CM

## 2022-04-05 DIAGNOSIS — J01.00 ACUTE NON-RECURRENT MAXILLARY SINUSITIS: Primary | ICD-10-CM

## 2022-04-05 DIAGNOSIS — R05.9 COUGH: ICD-10-CM

## 2022-04-05 DIAGNOSIS — R50.9 FEVER, UNSPECIFIED FEVER CAUSE: ICD-10-CM

## 2022-04-05 LAB
FLUAV AG NPH QL: NEGATIVE
FLUBV AG NPH QL IA: NEGATIVE
SARS-COV-2 RNA PNL SPEC NAA+PROBE: NOT DETECTED

## 2022-04-05 PROCEDURE — 99213 OFFICE O/P EST LOW 20 MIN: CPT | Performed by: NURSE PRACTITIONER

## 2022-04-05 PROCEDURE — 87804 INFLUENZA ASSAY W/OPTIC: CPT

## 2022-04-05 PROCEDURE — 87635 SARS-COV-2 COVID-19 AMP PRB: CPT

## 2022-04-05 RX ORDER — AZITHROMYCIN 250 MG/1
TABLET, FILM COATED ORAL
Qty: 6 TABLET | Refills: 0 | Status: SHIPPED | OUTPATIENT
Start: 2022-04-05 | End: 2022-05-17

## 2022-04-05 RX ORDER — METHYLPREDNISOLONE 4 MG/1
TABLET ORAL
Qty: 1 EACH | Refills: 0 | Status: SHIPPED | OUTPATIENT
Start: 2022-04-05 | End: 2022-05-17

## 2022-04-05 NOTE — TELEPHONE ENCOUNTER
Caller: Nicolas Amaro    Relationship: Self    Best call back number: [phone]    What test was performed: FLU SWAB    When was the test performed: 4/5/22    Where was the test performed:                   Yes

## 2022-04-05 NOTE — PROGRESS NOTES
"This was an audio and video enabled telemedicine encounter. Patient verbally consented to visit. Patient was located at Home and I was located at [facility]  location.     Chief Complaint  Sinus Problem    Subjective    History of Present Illness      Patient presents to [facility] for   Patient complains of sinus congestion, runny nose, sore throat, dry cough and some low back pain since Saturday.  Possible fever on Sunday.  She is requesting Covid testing today.         Review of Systems   Constitutional: Positive for fever.   HENT: Positive for congestion, sinus pressure, sneezing and sore throat.    Eyes: Negative.    Respiratory: Positive for cough.    Cardiovascular: Negative.    Gastrointestinal: Negative.    Endocrine: Negative.    Genitourinary: Negative.    Musculoskeletal: Negative.    Skin: Negative.    Allergic/Immunologic: Negative.    Neurological: Negative.    Hematological: Negative.    Psychiatric/Behavioral: Negative.        I have reviewed and agree with the HPI and ROS information as above.  Hortensia Vieira, APRN     Objective   Vital Signs:   Ht 154.9 cm (61\")   Wt 88 kg (194 lb)   BMI 36.66 kg/m²           Physical Exam  Vitals and nursing note reviewed.   Constitutional:       Appearance: Normal appearance. She is well-developed. She is obese.   HENT:      Head: Normocephalic and atraumatic.      Right Ear: External ear normal.      Left Ear: External ear normal.      Nose: Nose normal. No nasal tenderness or congestion.      Mouth/Throat:      Lips: Pink. No lesions.      Mouth: Mucous membranes are moist. No oral lesions.      Dentition: Normal dentition.      Pharynx: Oropharynx is clear. No pharyngeal swelling, oropharyngeal exudate or posterior oropharyngeal erythema.   Eyes:      General: Lids are normal. Vision grossly intact. No scleral icterus.        Right eye: No discharge.         Left eye: No discharge.      Extraocular Movements: " Extraocular movements intact.      Conjunctiva/sclera: Conjunctivae normal.      Right eye: Right conjunctiva is not injected.      Left eye: Left conjunctiva is not injected.      Pupils: Pupils are equal, round, and reactive to light.   Cardiovascular:      Rate and Rhythm: Normal rate and regular rhythm.      Heart sounds: Normal heart sounds. No murmur heard.    No gallop.   Pulmonary:      Effort: Pulmonary effort is normal.      Breath sounds: Normal breath sounds and air entry. No wheezing, rhonchi or rales.   Musculoskeletal:         General: No tenderness or deformity. Normal range of motion.      Cervical back: Full passive range of motion without pain, normal range of motion and neck supple.      Right lower leg: No edema.      Left lower leg: No edema.   Skin:     General: Skin is warm and dry.      Coloration: Skin is not jaundiced.      Findings: No rash.   Neurological:      Mental Status: She is alert and oriented to person, place, and time.      Cranial Nerves: Cranial nerves are intact.      Sensory: Sensation is intact.      Motor: Motor function is intact.      Coordination: Coordination is intact.      Gait: Gait is intact.   Psychiatric:         Attention and Perception: Attention normal.         Mood and Affect: Mood and affect normal.         Behavior: Behavior is not hyperactive. Behavior is cooperative.         Thought Content: Thought content normal.         Judgment: Judgment normal.          JING:    PHQ-9 Total Score:      Result Review  Data Reviewed: Labs reviewed by SPENSER Marshall:     COVID PRE-OP / PRE-PROCEDURE SCREENING ORDER (NO ISOLATION) - Swab, Nasal Cavity (04/05/2022 12:31)  Influenza Antigen, Rapid - Swab, Nasopharynx (04/05/2022 12:31)           Assessment and Plan      Problem List Items Addressed This Visit        Endocrine and Metabolic    Class 2 obesity with body mass index (BMI) of 36.0 to 36.9 in adult      Other Visit Diagnoses     Acute non-recurrent  maxillary sinusitis    -  Primary    Relevant Medications    azithromycin (Zithromax Z-Edison) 250 MG tablet    methylPREDNISolone (MEDROL) 4 MG dose pack    Suspected COVID-19 virus infection        Relevant Orders    COVID PRE-OP / PRE-PROCEDURE SCREENING ORDER (NO ISOLATION) - Swab, Nasal Cavity (Completed)    Cough        Relevant Orders    Influenza Antigen, Rapid - Swab, Nasopharynx (Completed)    Fever, unspecified fever cause            Patient being seen through telehealth today with complaints of sinus congestion, drainage, sore throat, dry cough, and low back pain.  Symptoms began on Saturday.  She also states she possibly had a fever on Sunday.  She is requesting a Covid test today.  She states she has never had COVID in the past.    Plan:    1.  Covid and flu swab ordered for patient to come by and complete.  If negative will send in an antibiotic and steroid pack as treatment.     --COVID and flu are both negative.  Z-Edison and Medrol Dosepak sent.  Follow-up if symptoms do not improve or become worse.      Follow Up   Return if symptoms worsen or fail to improve.  Patient was given instructions and counseling regarding her condition or for health maintenance advice. Please see specific information pulled into the AVS if appropriate.

## 2022-05-05 ENCOUNTER — LAB (OUTPATIENT)
Dept: LAB | Facility: HOSPITAL | Age: 52
End: 2022-05-05

## 2022-05-05 LAB
ALBUMIN SERPL-MCNC: 3.9 G/DL (ref 3.5–5)
ALBUMIN/GLOB SERPL: 1.2 G/DL (ref 1.1–2.5)
ALP SERPL-CCNC: 305 U/L (ref 24–120)
ALT SERPL W P-5'-P-CCNC: 77 U/L (ref 0–35)
ANION GAP SERPL CALCULATED.3IONS-SCNC: 2 MMOL/L (ref 4–13)
AST SERPL-CCNC: 74 U/L (ref 7–45)
AUTO MIXED CELLS #: 0.6 10*3/MM3 (ref 0.1–2.6)
AUTO MIXED CELLS %: 12.3 % (ref 0.1–24)
BILIRUB SERPL-MCNC: 0.4 MG/DL (ref 0.1–1)
BUN SERPL-MCNC: 19 MG/DL (ref 5–21)
BUN/CREAT SERPL: 34.5
CALCIUM SPEC-SCNC: 9.3 MG/DL (ref 8.4–10.4)
CHLORIDE SERPL-SCNC: 108 MMOL/L (ref 98–110)
CO2 SERPL-SCNC: 28 MMOL/L (ref 24–31)
CREAT SERPL-MCNC: 0.55 MG/DL (ref 0.5–1.4)
EGFRCR SERPLBLD CKD-EPI 2021: 111.8 ML/MIN/1.73
ERYTHROCYTE [DISTWIDTH] IN BLOOD BY AUTOMATED COUNT: 12.3 % (ref 12.3–15.4)
GLOBULIN UR ELPH-MCNC: 3.3 GM/DL
GLUCOSE SERPL-MCNC: 124 MG/DL (ref 70–100)
HCT VFR BLD AUTO: 37.6 % (ref 34–46.6)
HGB BLD-MCNC: 12.1 G/DL (ref 12–15.9)
LYMPHOCYTES # BLD AUTO: 1.6 10*3/MM3 (ref 0.7–3.1)
LYMPHOCYTES NFR BLD AUTO: 32.7 % (ref 19.6–45.3)
MCH RBC QN AUTO: 29 PG (ref 26.6–33)
MCHC RBC AUTO-ENTMCNC: 32.2 G/DL (ref 31.5–35.7)
MCV RBC AUTO: 90.2 FL (ref 79–97)
NEUTROPHILS NFR BLD AUTO: 2.8 10*3/MM3 (ref 1.7–7)
NEUTROPHILS NFR BLD AUTO: 55 % (ref 42.7–76)
PLATELET # BLD AUTO: 233 10*3/MM3 (ref 140–450)
PMV BLD AUTO: 10.2 FL (ref 6–12)
POTASSIUM SERPL-SCNC: 4.1 MMOL/L (ref 3.5–5.3)
PROT SERPL-MCNC: 7.2 G/DL (ref 6.3–8.7)
RBC # BLD AUTO: 4.17 10*6/MM3 (ref 3.77–5.28)
SODIUM SERPL-SCNC: 138 MMOL/L (ref 135–145)
WBC NRBC COR # BLD: 5 10*3/MM3 (ref 3.4–10.8)

## 2022-05-05 PROCEDURE — 86376 MICROSOMAL ANTIBODY EACH: CPT | Performed by: INTERNAL MEDICINE

## 2022-05-05 PROCEDURE — 84481 FREE ASSAY (FT-3): CPT | Performed by: INTERNAL MEDICINE

## 2022-05-05 PROCEDURE — 84445 ASSAY OF TSI GLOBULIN: CPT | Performed by: INTERNAL MEDICINE

## 2022-05-05 PROCEDURE — 80050 GENERAL HEALTH PANEL: CPT | Performed by: INTERNAL MEDICINE

## 2022-05-05 PROCEDURE — 36415 COLL VENOUS BLD VENIPUNCTURE: CPT | Performed by: INTERNAL MEDICINE

## 2022-05-05 PROCEDURE — 84439 ASSAY OF FREE THYROXINE: CPT | Performed by: INTERNAL MEDICINE

## 2022-05-06 LAB
T3FREE SERPL-MCNC: 20.1 PG/ML (ref 2–4.4)
T4 FREE SERPL-MCNC: 5.64 NG/DL (ref 0.93–1.7)
TSH SERPL DL<=0.05 MIU/L-ACNC: <0.005 UIU/ML (ref 0.27–4.2)

## 2022-05-07 LAB — THYROPEROXIDASE AB SERPL-ACNC: >600 IU/ML (ref 0–34)

## 2022-05-10 ENCOUNTER — TREATMENT (OUTPATIENT)
Dept: ENDOCRINOLOGY | Facility: CLINIC | Age: 52
End: 2022-05-10

## 2022-05-10 DIAGNOSIS — E05.90 HYPERTHYROIDISM: Primary | ICD-10-CM

## 2022-05-10 DIAGNOSIS — E05.00 GRAVES' DISEASE: ICD-10-CM

## 2022-05-10 LAB — TSI SER-ACNC: 6.58 IU/L (ref 0–0.55)

## 2022-05-10 PROCEDURE — 99422 OL DIG E/M SVC 11-20 MIN: CPT | Performed by: INTERNAL MEDICINE

## 2022-05-10 RX ORDER — METHIMAZOLE 10 MG/1
10 TABLET ORAL DAILY
Qty: 90 TABLET | Refills: 11 | Status: SHIPPED | OUTPATIENT
Start: 2022-05-10 | End: 2022-06-21 | Stop reason: SDUPTHER

## 2022-05-10 NOTE — PROGRESS NOTES
Evisit      ICD-10-CM ICD-9-CM   1. Hyperthyroidism  E05.90 242.90   2. Graves' disease  E05.00 242.00     Patient contacted office via My Chart          Did you receive my blood work results? I had my bloodwork completed on the 5th and results were posted to my MyChart on the 6th. I just need to know how to treat, my TSH was very low and T3 and T4 were very high, and I have some concerns about some other things that were high, as well. I wondered if I could have my Graves antibodies checked, since that wasn't included. The antibodies for Hashimoto's were high, but I've had both present in the past.       --    My repsonse :    Yes , your Graves Disease / Hyperthyroidism recurred    I have sent a rx for methimazole 10 mg daily , we will need repeat lab work in 1 month.     You may want to consider a more permanent intervention since Graves recurred, we will not be able to stop methimazole again.     I suggest that once thyroid levels are normal we either proceed with surgery or radioactive iodine ablation     Bran Nagel MD         This document has been electronically signed by Bran Nagel MD on May 10, 2022 09:56 CDT    Total spent time , 16 minutes

## 2022-05-16 ENCOUNTER — APPOINTMENT (OUTPATIENT)
Dept: GENERAL RADIOLOGY | Facility: HOSPITAL | Age: 52
End: 2022-05-16

## 2022-05-16 ENCOUNTER — HOSPITAL ENCOUNTER (EMERGENCY)
Facility: HOSPITAL | Age: 52
Discharge: HOME | End: 2022-05-16
Attending: EMERGENCY MEDICINE | Admitting: EMERGENCY MEDICINE

## 2022-05-16 ENCOUNTER — APPOINTMENT (OUTPATIENT)
Dept: CT IMAGING | Facility: HOSPITAL | Age: 52
End: 2022-05-16

## 2022-05-16 VITALS
HEART RATE: 78 BPM | RESPIRATION RATE: 12 BRPM | TEMPERATURE: 98 F | HEIGHT: 61 IN | BODY MASS INDEX: 36.44 KG/M2 | DIASTOLIC BLOOD PRESSURE: 80 MMHG | SYSTOLIC BLOOD PRESSURE: 120 MMHG | OXYGEN SATURATION: 99 % | WEIGHT: 193 LBS

## 2022-05-16 DIAGNOSIS — R06.02 SHORTNESS OF BREATH: ICD-10-CM

## 2022-05-16 DIAGNOSIS — R53.83 FATIGUE, UNSPECIFIED TYPE: ICD-10-CM

## 2022-05-16 DIAGNOSIS — E55.9 VITAMIN D DEFICIENCY: ICD-10-CM

## 2022-05-16 DIAGNOSIS — R79.89 ELEVATED BRAIN NATRIURETIC PEPTIDE (BNP) LEVEL: ICD-10-CM

## 2022-05-16 DIAGNOSIS — R07.9 CHEST PAIN, UNSPECIFIED TYPE: Primary | ICD-10-CM

## 2022-05-16 LAB
ALBUMIN SERPL-MCNC: 3.9 G/DL (ref 3.5–5.2)
ALBUMIN/GLOB SERPL: 1.4 G/DL
ALP SERPL-CCNC: 282 U/L (ref 39–117)
ALT SERPL W P-5'-P-CCNC: 63 U/L (ref 1–33)
ANION GAP SERPL CALCULATED.3IONS-SCNC: 9 MMOL/L (ref 5–15)
AST SERPL-CCNC: 63 U/L (ref 1–32)
BASOPHILS # BLD AUTO: 0.02 10*3/MM3 (ref 0–0.2)
BASOPHILS NFR BLD AUTO: 0.4 % (ref 0–1.5)
BILIRUB SERPL-MCNC: 0.3 MG/DL (ref 0–1.2)
BUN SERPL-MCNC: 13 MG/DL (ref 6–20)
BUN/CREAT SERPL: 20.3 (ref 7–25)
CALCIUM SPEC-SCNC: 9.3 MG/DL (ref 8.6–10.5)
CHLORIDE SERPL-SCNC: 109 MMOL/L (ref 98–107)
CO2 SERPL-SCNC: 25 MMOL/L (ref 22–29)
CREAT SERPL-MCNC: 0.64 MG/DL (ref 0.57–1)
D DIMER PPP FEU-MCNC: 0.54 MCGFEU/ML (ref 0–0.5)
DEPRECATED RDW RBC AUTO: 39.8 FL (ref 37–54)
EGFRCR SERPLBLD CKD-EPI 2021: 107.8 ML/MIN/1.73
EOSINOPHIL # BLD AUTO: 0.06 10*3/MM3 (ref 0–0.4)
EOSINOPHIL NFR BLD AUTO: 1.1 % (ref 0.3–6.2)
ERYTHROCYTE [DISTWIDTH] IN BLOOD BY AUTOMATED COUNT: 12 % (ref 12.3–15.4)
FLUAV RNA RESP QL NAA+PROBE: NOT DETECTED
FLUBV RNA RESP QL NAA+PROBE: NOT DETECTED
GLOBULIN UR ELPH-MCNC: 2.8 GM/DL
GLUCOSE SERPL-MCNC: 91 MG/DL (ref 65–99)
HCT VFR BLD AUTO: 36.7 % (ref 34–46.6)
HGB BLD-MCNC: 11.9 G/DL (ref 12–15.9)
HOLD SPECIMEN: NORMAL
HOLD SPECIMEN: NORMAL
IMM GRANULOCYTES # BLD AUTO: 0.01 10*3/MM3 (ref 0–0.05)
IMM GRANULOCYTES NFR BLD AUTO: 0.2 % (ref 0–0.5)
LYMPHOCYTES # BLD AUTO: 1.43 10*3/MM3 (ref 0.7–3.1)
LYMPHOCYTES NFR BLD AUTO: 26.4 % (ref 19.6–45.3)
MCH RBC QN AUTO: 29.4 PG (ref 26.6–33)
MCHC RBC AUTO-ENTMCNC: 32.4 G/DL (ref 31.5–35.7)
MCV RBC AUTO: 90.6 FL (ref 79–97)
MONOCYTES # BLD AUTO: 0.61 10*3/MM3 (ref 0.1–0.9)
MONOCYTES NFR BLD AUTO: 11.3 % (ref 5–12)
NEUTROPHILS NFR BLD AUTO: 3.28 10*3/MM3 (ref 1.7–7)
NEUTROPHILS NFR BLD AUTO: 60.6 % (ref 42.7–76)
NRBC BLD AUTO-RTO: 0 /100 WBC (ref 0–0.2)
NT-PROBNP SERPL-MCNC: 1387 PG/ML (ref 0–900)
PLATELET # BLD AUTO: 234 10*3/MM3 (ref 140–450)
PMV BLD AUTO: 11.5 FL (ref 6–12)
POTASSIUM SERPL-SCNC: 3.8 MMOL/L (ref 3.5–5.2)
PROT SERPL-MCNC: 6.7 G/DL (ref 6–8.5)
RBC # BLD AUTO: 4.05 10*6/MM3 (ref 3.77–5.28)
SARS-COV-2 RNA RESP QL NAA+PROBE: NOT DETECTED
SODIUM SERPL-SCNC: 143 MMOL/L (ref 136–145)
TROPONIN T SERPL-MCNC: <0.01 NG/ML (ref 0–0.03)
TROPONIN T SERPL-MCNC: <0.01 NG/ML (ref 0–0.03)
WBC NRBC COR # BLD: 5.41 10*3/MM3 (ref 3.4–10.8)
WHOLE BLOOD HOLD COAG: NORMAL
WHOLE BLOOD HOLD SPECIMEN: NORMAL

## 2022-05-16 PROCEDURE — 85379 FIBRIN DEGRADATION QUANT: CPT | Performed by: EMERGENCY MEDICINE

## 2022-05-16 PROCEDURE — 93005 ELECTROCARDIOGRAM TRACING: CPT | Performed by: EMERGENCY MEDICINE

## 2022-05-16 PROCEDURE — 96374 THER/PROPH/DIAG INJ IV PUSH: CPT

## 2022-05-16 PROCEDURE — 94640 AIRWAY INHALATION TREATMENT: CPT

## 2022-05-16 PROCEDURE — 82306 VITAMIN D 25 HYDROXY: CPT

## 2022-05-16 PROCEDURE — 71045 X-RAY EXAM CHEST 1 VIEW: CPT

## 2022-05-16 PROCEDURE — 0 IOPAMIDOL PER 1 ML: Performed by: EMERGENCY MEDICINE

## 2022-05-16 PROCEDURE — 25010000002 FUROSEMIDE PER 20 MG: Performed by: EMERGENCY MEDICINE

## 2022-05-16 PROCEDURE — 71275 CT ANGIOGRAPHY CHEST: CPT

## 2022-05-16 PROCEDURE — 99283 EMERGENCY DEPT VISIT LOW MDM: CPT

## 2022-05-16 PROCEDURE — 83880 ASSAY OF NATRIURETIC PEPTIDE: CPT | Performed by: EMERGENCY MEDICINE

## 2022-05-16 PROCEDURE — 93010 ELECTROCARDIOGRAM REPORT: CPT | Performed by: INTERNAL MEDICINE

## 2022-05-16 PROCEDURE — 85025 COMPLETE CBC W/AUTO DIFF WBC: CPT | Performed by: EMERGENCY MEDICINE

## 2022-05-16 PROCEDURE — 94664 DEMO&/EVAL PT USE INHALER: CPT

## 2022-05-16 PROCEDURE — 99284 EMERGENCY DEPT VISIT MOD MDM: CPT

## 2022-05-16 PROCEDURE — 80053 COMPREHEN METABOLIC PANEL: CPT | Performed by: EMERGENCY MEDICINE

## 2022-05-16 PROCEDURE — 36415 COLL VENOUS BLD VENIPUNCTURE: CPT

## 2022-05-16 PROCEDURE — 84484 ASSAY OF TROPONIN QUANT: CPT | Performed by: EMERGENCY MEDICINE

## 2022-05-16 PROCEDURE — 87636 SARSCOV2 & INF A&B AMP PRB: CPT | Performed by: EMERGENCY MEDICINE

## 2022-05-16 RX ORDER — ASPIRIN 81 MG/1
324 TABLET, CHEWABLE ORAL ONCE
Status: DISCONTINUED | OUTPATIENT
Start: 2022-05-16 | End: 2022-05-16

## 2022-05-16 RX ORDER — FUROSEMIDE 10 MG/ML
40 INJECTION INTRAMUSCULAR; INTRAVENOUS ONCE
Status: COMPLETED | OUTPATIENT
Start: 2022-05-16 | End: 2022-05-16

## 2022-05-16 RX ORDER — SODIUM CHLORIDE 0.9 % (FLUSH) 0.9 %
10 SYRINGE (ML) INJECTION AS NEEDED
Status: DISCONTINUED | OUTPATIENT
Start: 2022-05-16 | End: 2022-05-16 | Stop reason: HOSPADM

## 2022-05-16 RX ORDER — FUROSEMIDE 20 MG/1
20 TABLET ORAL 2 TIMES DAILY PRN
Qty: 10 TABLET | Refills: 0 | Status: SHIPPED | OUTPATIENT
Start: 2022-05-16 | End: 2022-06-07

## 2022-05-16 RX ORDER — ALBUTEROL SULFATE 2.5 MG/3ML
2.5 SOLUTION RESPIRATORY (INHALATION) ONCE
Status: COMPLETED | OUTPATIENT
Start: 2022-05-16 | End: 2022-05-16

## 2022-05-16 RX ADMIN — ALBUTEROL SULFATE 2.5 MG: 2.5 SOLUTION RESPIRATORY (INHALATION) at 14:40

## 2022-05-16 RX ADMIN — IOPAMIDOL 100 ML: 755 INJECTION, SOLUTION INTRAVENOUS at 14:07

## 2022-05-16 RX ADMIN — FUROSEMIDE 40 MG: 10 INJECTION, SOLUTION INTRAVENOUS at 16:28

## 2022-05-17 ENCOUNTER — OFFICE VISIT (OUTPATIENT)
Dept: FAMILY MEDICINE CLINIC | Facility: CLINIC | Age: 52
End: 2022-05-17

## 2022-05-17 ENCOUNTER — TELEPHONE (OUTPATIENT)
Dept: ENDOCRINOLOGY | Facility: CLINIC | Age: 52
End: 2022-05-17

## 2022-05-17 VITALS
HEIGHT: 61 IN | OXYGEN SATURATION: 97 % | DIASTOLIC BLOOD PRESSURE: 78 MMHG | WEIGHT: 192 LBS | RESPIRATION RATE: 20 BRPM | TEMPERATURE: 98.2 F | BODY MASS INDEX: 36.25 KG/M2 | SYSTOLIC BLOOD PRESSURE: 122 MMHG | HEART RATE: 84 BPM

## 2022-05-17 DIAGNOSIS — R07.9 CHEST PAIN, UNSPECIFIED TYPE: ICD-10-CM

## 2022-05-17 DIAGNOSIS — R79.89 ELEVATED BRAIN NATRIURETIC PEPTIDE (BNP) LEVEL: ICD-10-CM

## 2022-05-17 DIAGNOSIS — E05.00 GRAVES' DISEASE: ICD-10-CM

## 2022-05-17 DIAGNOSIS — E66.01 CLASS 2 SEVERE OBESITY DUE TO EXCESS CALORIES WITH SERIOUS COMORBIDITY AND BODY MASS INDEX (BMI) OF 36.0 TO 36.9 IN ADULT: ICD-10-CM

## 2022-05-17 DIAGNOSIS — F41.9 ANXIETY: ICD-10-CM

## 2022-05-17 DIAGNOSIS — R53.83 FATIGUE, UNSPECIFIED TYPE: Primary | ICD-10-CM

## 2022-05-17 DIAGNOSIS — R06.02 SHORTNESS OF BREATH: ICD-10-CM

## 2022-05-17 DIAGNOSIS — E55.9 VITAMIN D DEFICIENCY: ICD-10-CM

## 2022-05-17 LAB
QT INTERVAL: 364 MS
QT INTERVAL: 400 MS
QTC INTERVAL: 425 MS
QTC INTERVAL: 440 MS

## 2022-05-17 PROCEDURE — 99214 OFFICE O/P EST MOD 30 MIN: CPT

## 2022-05-17 RX ORDER — BUSPIRONE HYDROCHLORIDE 5 MG/1
5 TABLET ORAL 3 TIMES DAILY
Qty: 90 TABLET | Refills: 0 | Status: SHIPPED | OUTPATIENT
Start: 2022-05-17 | End: 2023-01-20

## 2022-05-17 NOTE — PROGRESS NOTES
"Chief Complaint  ER Follow Up (Chest pain, elevated BNP, SOB)    Subjective    History of Present Illness      Patient presents to [facility] for   Pt is being seen today for ER follow up. Pt was seen yesterday at  ED. Pt seen for chest pain, elevated BNP, and SOB. Pt states she sees Dr. Kiser for endo. Pt is requesting referral to new endocrinologist at [place]. Pt states she believes sx seen for in ED were due to uncontrolled thyroid/flare.        Review of Systems   Constitutional: Positive for fatigue and unexpected weight gain.   Psychiatric/Behavioral: The patient is nervous/anxious.    All other systems reviewed and are negative.      I have reviewed and agree with the HPI and ROS information as above.  SPENSER Baires     Objective   Vital Signs:   /78   Pulse 84   Temp 98.2 °F (36.8 °C)   Resp 20   Ht 154.9 cm (61\")   Wt 87.1 kg (192 lb)   SpO2 97%   BMI 36.28 kg/m²     Class 2 Severe Obesity (BMI >=35 and <=39.9). Obesity-related health conditions include the following: none. Obesity is unchanged. BMI is is above average; no BMI management plan is appropriate. We discussed portion control and increasing exercise.      Physical Exam  Constitutional:       Appearance: She is well-developed. She is obese.   HENT:      Head: Normocephalic and atraumatic.      Right Ear: Tympanic membrane, ear canal and external ear normal.      Left Ear: Tympanic membrane, ear canal and external ear normal.      Nose: Nose normal. No septal deviation, nasal tenderness or congestion.      Mouth/Throat:      Lips: Pink. No lesions.      Mouth: Mucous membranes are moist. No oral lesions.      Dentition: Normal dentition.      Pharynx: Oropharynx is clear. No pharyngeal swelling, oropharyngeal exudate or posterior oropharyngeal erythema.   Eyes:      General: Lids are normal. Vision grossly intact. No scleral icterus.        Right eye: No discharge.         Left eye: " No discharge.      Extraocular Movements: Extraocular movements intact.      Conjunctiva/sclera: Conjunctivae normal.      Right eye: Right conjunctiva is not injected.      Left eye: Left conjunctiva is not injected.      Pupils: Pupils are equal, round, and reactive to light.   Neck:      Thyroid: No thyroid mass.      Trachea: Trachea normal.   Cardiovascular:      Rate and Rhythm: Normal rate and regular rhythm.      Heart sounds: Normal heart sounds. No murmur heard.    No gallop.   Pulmonary:      Effort: Pulmonary effort is normal.      Breath sounds: Normal breath sounds and air entry. No wheezing, rhonchi or rales.   Abdominal:      General: There is no distension.      Palpations: Abdomen is soft. There is no mass.      Tenderness: There is no abdominal tenderness. There is no right CVA tenderness, left CVA tenderness, guarding or rebound.   Musculoskeletal:         General: No tenderness or deformity. Normal range of motion.      Cervical back: Full passive range of motion without pain, normal range of motion and neck supple.      Thoracic back: Normal.      Right lower leg: No edema.      Left lower leg: No edema.   Skin:     General: Skin is warm and dry.      Coloration: Skin is not jaundiced.      Findings: No rash.   Neurological:      Mental Status: She is alert and oriented to person, place, and time.      Cranial Nerves: Cranial nerves are intact.      Sensory: Sensation is intact.      Motor: Motor function is intact.      Coordination: Coordination is intact.      Gait: Gait is intact.      Deep Tendon Reflexes: Reflexes are normal and symmetric.   Psychiatric:         Mood and Affect: Affect normal. Mood is anxious.         Judgment: Judgment normal.          JING-7: Over the last two weeks, how often have you been bothered by the following problems?  Feeling nervous, anxious or on edge: More than half the days  Not being able to stop or control worrying: More than half the days  Worrying too  much about different things: More than half the days  Trouble Relaxing: Nearly every day  Being so restless that it is hard to sit still: Not at all  Becoming easily annoyed or irritable: Not at all  Feeling afraid as if something awful might happen: Not at all  JING 7 Total Score: 9  If you checked any problems, how difficult have these problems made it for you to do your work, take care of things at home, or get along with other people: Somewhat difficult    PHQ-2 Depression Screening  Little interest or pleasure in doing things? 0-->not at all   Feeling down, depressed, or hopeless? 0-->not at all   PHQ-2 Total Score 0     PHQ-9 Depression Screening  Little interest or pleasure in doing things? 0-->not at all   Feeling down, depressed, or hopeless? 0-->not at all   Trouble falling or staying asleep, or sleeping too much?     Feeling tired or having little energy?     Poor appetite or overeating?     Feeling bad about yourself - or that you are a failure or have let yourself or your family down?     Trouble concentrating on things, such as reading the newspaper or watching television?     Moving or speaking so slowly that other people could have noticed? Or the opposite - being so fidgety or restless that you have been moving around a lot more than usual?     Thoughts that you would be better off dead, or of hurting yourself in some way?     PHQ-9 Total Score 0   If you checked off any problems, how difficult have these problems made it for you to do your work, take care of things at home, or get along with other people?        Result Review  Data Reviewed:                   Assessment and Plan      Problem List Items Addressed This Visit        Endocrine and Metabolic    Graves' disease    Relevant Orders    Ambulatory Referral to Endocrinology (Completed)    Vitamin D deficiency    Relevant Orders    Vitamin D 25 hydroxy    Class 2 obesity with body mass index (BMI) of 36.0 to 36.9 in adult       Mental Health     Anxiety    Relevant Medications    busPIRone (BUSPAR) 5 MG tablet      Other Visit Diagnoses     Fatigue, unspecified type    -  Primary    Relevant Orders    Ambulatory Referral to Endocrinology (Completed)    Ambulatory Referral to Cardiology    Adult Transthoracic Echo Complete W/ Cont if Necessary Per Protocol    Vitamin D 25 hydroxy    Shortness of breath        Relevant Orders    Ambulatory Referral to Cardiology    Adult Transthoracic Echo Complete W/ Cont if Necessary Per Protocol    Chest pain, unspecified type        Relevant Orders    Ambulatory Referral to Cardiology    Elevated brain natriuretic peptide (BNP) level        Relevant Orders    Ambulatory Referral to Cardiology      Patient is seen today for hospital follow-up with shortness of breath, chest pain.  Patient states that she went to the ER due to having chest pain with pain rating down her right arm.  Patient states that she has had shortness of breath over the past couple of weeks and he is related this to a thyroid flare due to her Graves' disease.  Patient states that she has gained approximately 8 to 10 pounds in 10 days for no reason.  On reviewing patient's visit to the ED patient CTA was negative, chest x-ray negative, BNP elevated at 1387, D-dimer elevated at 0.54.  Other lab work was unremarkable.  Patient was given Lasix IM and sent home with Lasix as needed.  Patient states that she did get relief from shortness of breath after being administered this.  Patient states that she did not realize she had a lot of fluid in her abdomen until medication administration.  Patient denies any chest pain or shortness of breath at today's visit.  Patient states that she is still very fatigued but is relating this to her thyroid.  Patient states that she has felt this way since March.  Patient's endocrinologist did work this up at the beginning of May in which her thyroid levels were increased.  Patient also did have a thyroid ultrasound done as  well.  Patient is requesting a referral to Dr. Carbajal at [place] to have another opinion and be followed for her thyroid.  Patient is taking Tapazole 10 mg that was restarted at the beginning of the month.  Patient states that she was also in the past giving atenolol by her endocrinologist in which she restarted on her own this month.  Patient states she is unsure of the milligrams that she was on but states that since starting this she feels like it has helped.  I encouraged patient to reach out to her current endocrinologist and discuss the atenolol medication with them at this time in order to have a more UTD script. Patient voiced understanding.      Due to patient's increased D-dimer and BNP along with chest pain and shortness of breath we will order an echo and refer to cardiology at this time.     Patient has had thyroid lab work that is UTD, and I do not feel that we should repeat these. Patient agrees today in office. Due to fatigue will order UTD Vitamin D at this time.     Plan  1. Referral to Endocrinology: Dr. Carbajal;   2. Referral to Cardiology for elevated BNP/D-Dimer  3. Echo ordered today  4. Monitor at home weights while taking lasix. If significant increase in weight gain return for medical treatment.   5. Vitamin D ordered today.   6.  Start BuSpar 5 mg 3 times daily as needed as needed for anxiety.  Patient states that with everything going on she does feel that her anxiety is increased but does not want to take a daily medication.  We will start with as needed med at this time.  Side effects discussed.      D-dimer, Quantitative (05/16/2022 12:13)  BNP (05/16/2022 12:13)  Troponin (05/16/2022 12:13)  CT Angiogram Chest (05/16/2022 14:07)  XR Chest 1 View (05/16/2022 13:16)        Follow Up   Return if symptoms worsen or fail to improve.  Patient was given instructions and counseling regarding her condition or for health maintenance advice. Please see specific information pulled into the  AVS if appropriate.

## 2022-05-17 NOTE — TELEPHONE ENCOUNTER
Pt would like a call back to discuss her new dose of thyroid medication. She doesn't believe it is helping. Thank you

## 2022-05-18 ENCOUNTER — TELEPHONE (OUTPATIENT)
Dept: FAMILY MEDICINE CLINIC | Facility: CLINIC | Age: 52
End: 2022-05-18

## 2022-05-18 LAB — 25(OH)D3 SERPL-MCNC: 27.8 NG/ML (ref 30–100)

## 2022-05-18 NOTE — TELEPHONE ENCOUNTER
----- Message from SPENSER Baires sent at 5/18/2022  7:14 AM CDT -----  Please have patient continue Vitamin D regimen currently prescribed.

## 2022-05-20 ENCOUNTER — TELEPHONE (OUTPATIENT)
Dept: ENDOCRINOLOGY | Facility: CLINIC | Age: 52
End: 2022-05-20

## 2022-05-20 NOTE — TELEPHONE ENCOUNTER
Pt says she has started Methimazole 10MG but does not feel it has helped. Pt says she is having heat sensitivity, muscle weakness and overall same symptoms since she was first diagnosed. Pt also says she had an old script for Atenolol and has been taking it, and its helped. Pt would like a script for Atenolol 25MG sent to [facility] on [address]. PT WANTS A CALL BACK CONCERNING THIS.      Thanks

## 2022-05-26 ENCOUNTER — LAB (OUTPATIENT)
Dept: LAB | Facility: HOSPITAL | Age: 52
End: 2022-05-26

## 2022-05-26 LAB
ALBUMIN SERPL-MCNC: 4 G/DL (ref 3.5–5)
ALBUMIN/GLOB SERPL: 1.3 G/DL (ref 1.1–2.5)
ALP SERPL-CCNC: 200 U/L (ref 24–120)
ALT SERPL W P-5'-P-CCNC: 43 U/L (ref 0–35)
ANION GAP SERPL CALCULATED.3IONS-SCNC: 9 MMOL/L (ref 4–13)
AST SERPL-CCNC: 43 U/L (ref 7–45)
AUTO MIXED CELLS #: 0.7 10*3/MM3 (ref 0.1–2.6)
AUTO MIXED CELLS %: 9.8 % (ref 0.1–24)
BILIRUB SERPL-MCNC: 0.3 MG/DL (ref 0.1–1)
BUN SERPL-MCNC: 17 MG/DL (ref 5–21)
BUN/CREAT SERPL: 18.7
CALCIUM SPEC-SCNC: 9.3 MG/DL (ref 8.4–10.4)
CHLORIDE SERPL-SCNC: 104 MMOL/L (ref 98–110)
CO2 SERPL-SCNC: 28 MMOL/L (ref 24–31)
CREAT SERPL-MCNC: 0.91 MG/DL (ref 0.5–1.4)
EGFRCR SERPLBLD CKD-EPI 2021: 77 ML/MIN/1.73
ERYTHROCYTE [DISTWIDTH] IN BLOOD BY AUTOMATED COUNT: 11.8 % (ref 12.3–15.4)
GLOBULIN UR ELPH-MCNC: 3.1 GM/DL
GLUCOSE SERPL-MCNC: 100 MG/DL (ref 70–100)
HCT VFR BLD AUTO: 36.2 % (ref 34–46.6)
HGB BLD-MCNC: 12.1 G/DL (ref 12–15.9)
LYMPHOCYTES # BLD AUTO: 2 10*3/MM3 (ref 0.7–3.1)
LYMPHOCYTES NFR BLD AUTO: 29.1 % (ref 19.6–45.3)
MCH RBC QN AUTO: 29.7 PG (ref 26.6–33)
MCHC RBC AUTO-ENTMCNC: 33.4 G/DL (ref 31.5–35.7)
MCV RBC AUTO: 88.7 FL (ref 79–97)
NEUTROPHILS NFR BLD AUTO: 4.1 10*3/MM3 (ref 1.7–7)
NEUTROPHILS NFR BLD AUTO: 61.1 % (ref 42.7–76)
PLATELET # BLD AUTO: 231 10*3/MM3 (ref 140–450)
PMV BLD AUTO: 11.1 FL (ref 6–12)
POTASSIUM SERPL-SCNC: 4.7 MMOL/L (ref 3.5–5.3)
PROT SERPL-MCNC: 7.1 G/DL (ref 6.3–8.7)
RBC # BLD AUTO: 4.08 10*6/MM3 (ref 3.77–5.28)
SODIUM SERPL-SCNC: 141 MMOL/L (ref 135–145)
WBC NRBC COR # BLD: 6.8 10*3/MM3 (ref 3.4–10.8)

## 2022-05-26 PROCEDURE — 84481 FREE ASSAY (FT-3): CPT | Performed by: INTERNAL MEDICINE

## 2022-05-26 PROCEDURE — 36415 COLL VENOUS BLD VENIPUNCTURE: CPT | Performed by: INTERNAL MEDICINE

## 2022-05-26 PROCEDURE — 80050 GENERAL HEALTH PANEL: CPT | Performed by: INTERNAL MEDICINE

## 2022-05-26 PROCEDURE — 84439 ASSAY OF FREE THYROXINE: CPT | Performed by: INTERNAL MEDICINE

## 2022-05-27 LAB
T3FREE SERPL-MCNC: 10.8 PG/ML (ref 2–4.4)
T4 FREE SERPL-MCNC: 2.92 NG/DL (ref 0.93–1.7)
TSH SERPL DL<=0.05 MIU/L-ACNC: <0.005 UIU/ML (ref 0.27–4.2)

## 2022-06-07 ENCOUNTER — TELEMEDICINE (OUTPATIENT)
Dept: ENDOCRINOLOGY | Facility: CLINIC | Age: 52
End: 2022-06-07

## 2022-06-07 DIAGNOSIS — E55.9 VITAMIN D DEFICIENCY: ICD-10-CM

## 2022-06-07 DIAGNOSIS — E05.90 HYPERTHYROIDISM: Primary | ICD-10-CM

## 2022-06-07 DIAGNOSIS — I50.9 HEART FAILURE, UNSPECIFIED HF CHRONICITY, UNSPECIFIED HEART FAILURE TYPE: ICD-10-CM

## 2022-06-07 DIAGNOSIS — E06.3 HASHIMOTO'S THYROIDITIS: ICD-10-CM

## 2022-06-07 DIAGNOSIS — E05.00 GRAVES' DISEASE: ICD-10-CM

## 2022-06-07 PROCEDURE — 99214 OFFICE O/P EST MOD 30 MIN: CPT | Performed by: INTERNAL MEDICINE

## 2022-06-07 RX ORDER — ATENOLOL 25 MG/1
25 TABLET ORAL DAILY
Qty: 30 TABLET | Refills: 11 | Status: SHIPPED | OUTPATIENT
Start: 2022-06-07 | End: 2023-06-07

## 2022-06-07 RX ORDER — CHOLECALCIFEROL (VITAMIN D3) 1250 MCG
50000 CAPSULE ORAL
Qty: 1 CAPSULE | Refills: 11 | Status: SHIPPED | OUTPATIENT
Start: 2022-06-07

## 2022-06-07 NOTE — PROGRESS NOTES
Nicolas Amaro is a 51 y.o. female who presents for  evaluation of   Graves' Disease                                      This was a Telehealth Encounter. Benefits and Disadvantages of a Telehealth Visit were discussed and accepted by patient. .  Patient agreed to receive service through Telehealth visit as patient is being compliant with social distancing recommendations imparted by CDC.     You have chosen to receive care through a telehealth visit.  Do you consent to use a video/audio connection for your medical care today? Yes        Referring provider  Primary Care Provider    Donny Rincon MD    Duration 10 years    Timing - Graves' Disease is constant    Started as Hypothyroidism , transitioned to hyperthyroidism     Quality -  Improved on methimazole      Complications - Thyroid storm in the past    Current symptoms/problems  Palpitations     Objective:     Physical Exam   HENT:   Head: Normocephalic.   Pulmonary/Chest: Effort normal and breath sounds normal.   Abdominal: Normal appearance.   Musculoskeletal:      Right lower leg: No edema.      Left lower leg: No edema.   Neurological: She is alert.         Lab Results   Component Value Date    WBC 6.80 05/26/2022    HGB 12.1 05/26/2022    HCT 36.2 05/26/2022    MCV 88.7 05/26/2022     05/26/2022     Lab Results   Component Value Date    GLUCOSE 100 05/26/2022    BUN 17 05/26/2022    CREATININE 0.91 05/26/2022    EGFRIFNONA 81 02/07/2022    BCR 18.7 05/26/2022    K 4.7 05/26/2022    CO2 28.0 05/26/2022    CALCIUM 9.3 05/26/2022    ALBUMIN 4.00 05/26/2022    AST 43 05/26/2022    ALT 43 (H) 05/26/2022     Thyroid Workup    Lab Results   Component Value Date    TSH <0.005 (L) 05/26/2022    TSH <0.005 (L) 05/05/2022    TSH 0.833 02/07/2022       Lab Results   Component Value Date    FREET4 2.92 (H) 05/26/2022    FREET4 5.64 (H) 05/05/2022    FREET4 1.24 02/07/2022       Lab Results   Component Value Date    T3FREE 10.80 (H) 05/26/2022    T3FREE 20.10  (H) 05/05/2022    T3FREE 3.13 02/07/2022       TPO antibodies    Lab Results   Component Value Date    THYROIDAB >600 (H) 05/05/2022    THYROIDAB 403 (H) 02/07/2022    THYROIDAB 249 (H) 10/25/2021       TSI antibodies    Lab Results   Component Value Date    THYRSTIMIMMU 6.58 (H) 05/05/2022    THYRSTIMIMMU 0.35 02/07/2022    THYRSTIMIMMU 0.30 10/25/2021                 Assessment & Plan       ICD-10-CM ICD-9-CM   1. Hyperthyroidism  E05.90 242.90   2. Graves' disease  E05.00 242.00   3. Hashimoto's thyroiditis  E06.3 245.2   4. Vitamin D deficiency  E55.9 268.9   5. Heart failure, unspecified HF chronicity, unspecified heart failure type (HCC)  I50.9 428.9         Methimazole now on 5 mg daily  In the past now stopped and in remissions     TSI neg   TPO positive    atenolol 25 mg daily - may use PRN for palpitations        Assess      psoariasis     Topical steroid    Kenalog injection, before     Arthralgias,     We ruled out celiac and sle in May 2017    --    Obesity , phentermine in addition to lifestyle changes , stopped   Now on vyvanse      --    Low iron    Taking ferrous sulfate, taking big , or add vit C     Obtain celiac     --    Perimenopause     ---    Vit D Def     51 [address] u monthly     --    Transaminitis    Most likely fatty liver, weight loss

## 2022-06-16 ENCOUNTER — LAB (OUTPATIENT)
Dept: LAB | Facility: HOSPITAL | Age: 52
End: 2022-06-16

## 2022-06-16 LAB
ALBUMIN SERPL-MCNC: 4.1 G/DL (ref 3.5–5)
ALBUMIN/GLOB SERPL: 1.3 G/DL (ref 1.1–2.5)
ALP SERPL-CCNC: 211 U/L (ref 24–120)
ALT SERPL W P-5'-P-CCNC: 41 U/L (ref 0–35)
ANION GAP SERPL CALCULATED.3IONS-SCNC: 5 MMOL/L (ref 4–13)
AST SERPL-CCNC: 63 U/L (ref 7–45)
AUTO MIXED CELLS #: 0.4 10*3/MM3 (ref 0.1–2.6)
AUTO MIXED CELLS %: 7.2 % (ref 0.1–24)
BILIRUB SERPL-MCNC: 0.3 MG/DL (ref 0.1–1)
BUN SERPL-MCNC: 16 MG/DL (ref 5–21)
BUN/CREAT SERPL: 24.2
CALCIUM SPEC-SCNC: 9.2 MG/DL (ref 8.4–10.4)
CHLORIDE SERPL-SCNC: 108 MMOL/L (ref 98–110)
CO2 SERPL-SCNC: 27 MMOL/L (ref 24–31)
CREAT SERPL-MCNC: 0.66 MG/DL (ref 0.5–1.4)
EGFRCR SERPLBLD CKD-EPI 2021: 107 ML/MIN/1.73
ERYTHROCYTE [DISTWIDTH] IN BLOOD BY AUTOMATED COUNT: 12 % (ref 12.3–15.4)
GLOBULIN UR ELPH-MCNC: 3.2 GM/DL
GLUCOSE SERPL-MCNC: 117 MG/DL (ref 70–100)
HCT VFR BLD AUTO: 36.7 % (ref 34–46.6)
HGB BLD-MCNC: 12.5 G/DL (ref 12–15.9)
LYMPHOCYTES # BLD AUTO: 2.1 10*3/MM3 (ref 0.7–3.1)
LYMPHOCYTES NFR BLD AUTO: 34.6 % (ref 19.6–45.3)
MCH RBC QN AUTO: 29.3 PG (ref 26.6–33)
MCHC RBC AUTO-ENTMCNC: 34.1 G/DL (ref 31.5–35.7)
MCV RBC AUTO: 86.2 FL (ref 79–97)
NEUTROPHILS NFR BLD AUTO: 3.6 10*3/MM3 (ref 1.7–7)
NEUTROPHILS NFR BLD AUTO: 58.2 % (ref 42.7–76)
PLATELET # BLD AUTO: 223 10*3/MM3 (ref 140–450)
PMV BLD AUTO: 10.4 FL (ref 6–12)
POTASSIUM SERPL-SCNC: 4 MMOL/L (ref 3.5–5.3)
PROT SERPL-MCNC: 7.3 G/DL (ref 6.3–8.7)
RBC # BLD AUTO: 4.26 10*6/MM3 (ref 3.77–5.28)
SODIUM SERPL-SCNC: 140 MMOL/L (ref 135–145)
WBC NRBC COR # BLD: 6.1 10*3/MM3 (ref 3.4–10.8)

## 2022-06-16 PROCEDURE — 82607 VITAMIN B-12: CPT | Performed by: INTERNAL MEDICINE

## 2022-06-16 PROCEDURE — 86376 MICROSOMAL ANTIBODY EACH: CPT | Performed by: INTERNAL MEDICINE

## 2022-06-16 PROCEDURE — 83880 ASSAY OF NATRIURETIC PEPTIDE: CPT | Performed by: INTERNAL MEDICINE

## 2022-06-16 PROCEDURE — 85379 FIBRIN DEGRADATION QUANT: CPT | Performed by: INTERNAL MEDICINE

## 2022-06-16 PROCEDURE — 84445 ASSAY OF TSI GLOBULIN: CPT | Performed by: INTERNAL MEDICINE

## 2022-06-16 PROCEDURE — 84439 ASSAY OF FREE THYROXINE: CPT | Performed by: INTERNAL MEDICINE

## 2022-06-16 PROCEDURE — 82306 VITAMIN D 25 HYDROXY: CPT | Performed by: INTERNAL MEDICINE

## 2022-06-16 PROCEDURE — 36415 COLL VENOUS BLD VENIPUNCTURE: CPT | Performed by: INTERNAL MEDICINE

## 2022-06-16 PROCEDURE — 84481 FREE ASSAY (FT-3): CPT | Performed by: INTERNAL MEDICINE

## 2022-06-16 PROCEDURE — 80050 GENERAL HEALTH PANEL: CPT | Performed by: INTERNAL MEDICINE

## 2022-06-17 LAB
25(OH)D3 SERPL-MCNC: 26.6 NG/ML (ref 30–100)
NT-PROBNP SERPL-MCNC: 718 PG/ML (ref 0–900)
NT-PROBNP SERPL-MCNC: 747 PG/ML (ref 0–900)
T3FREE SERPL-MCNC: 11.5 PG/ML (ref 2–4.4)
T4 FREE SERPL-MCNC: 2.98 NG/DL (ref 0.93–1.7)
TSH SERPL DL<=0.05 MIU/L-ACNC: <0.005 UIU/ML (ref 0.27–4.2)
VIT B12 BLD-MCNC: 450 PG/ML (ref 211–946)

## 2022-06-18 LAB — THYROPEROXIDASE AB SERPL-ACNC: >600 IU/ML (ref 0–34)

## 2022-06-20 LAB — TSI SER-ACNC: 20.1 IU/L (ref 0–0.55)

## 2022-06-21 DIAGNOSIS — E05.90 HYPERTHYROIDISM: Primary | ICD-10-CM

## 2022-06-21 RX ORDER — METHIMAZOLE 10 MG/1
15 TABLET ORAL DAILY
Qty: 135 TABLET | Refills: 11 | Status: SHIPPED | OUTPATIENT
Start: 2022-06-21 | End: 2023-06-21

## 2022-06-22 LAB — D DIMER PPP FEU-MCNC: 0.34 MCGFEU/ML (ref 0–0.5)

## 2022-06-24 ENCOUNTER — TELEPHONE (OUTPATIENT)
Dept: FAMILY MEDICINE CLINIC | Facility: CLINIC | Age: 52
End: 2022-06-24

## 2022-06-24 ENCOUNTER — TELEMEDICINE (OUTPATIENT)
Dept: FAMILY MEDICINE CLINIC | Facility: CLINIC | Age: 52
End: 2022-06-24

## 2022-06-24 VITALS — HEIGHT: 61 IN | WEIGHT: 192 LBS | BODY MASS INDEX: 36.25 KG/M2

## 2022-06-24 DIAGNOSIS — F90.9 ADULT ATTENTION DEFICIT HYPERACTIVITY DISORDER: Primary | ICD-10-CM

## 2022-06-24 PROCEDURE — 99213 OFFICE O/P EST LOW 20 MIN: CPT | Performed by: NURSE PRACTITIONER

## 2022-06-24 RX ORDER — AMOXICILLIN AND CLAVULANATE POTASSIUM 875; 125 MG/1; MG/1
1 TABLET, FILM COATED ORAL 2 TIMES DAILY
Qty: 20 TABLET | Refills: 0 | Status: SHIPPED | OUTPATIENT
Start: 2022-06-24 | End: 2022-10-19

## 2022-06-24 RX ORDER — IBUPROFEN 800 MG/1
800 TABLET ORAL EVERY 8 HOURS PRN
Qty: 90 TABLET | Refills: 0 | Status: SHIPPED | OUTPATIENT
Start: 2022-06-24 | End: 2023-01-20

## 2022-06-24 NOTE — TELEPHONE ENCOUNTER
Caller: Nicolas Amaro    Relationship: Self    Best call back number:[phone]    What medication are you requesting: MEDICATION     What are your current symptoms: ABASES TOOTH     How long have you been experiencing symptoms: 06/23/2022    Have you had these symptoms before:    [x] Yes  [] No    Have you been treated for these symptoms before:   [x] Yes  [] No    If a prescription is needed, what is your preferred pharmacy and phone number: [id number] [place], [place] - [address] AT [facility] [address] - [phone]  - [phone] FX

## 2022-06-24 NOTE — PROGRESS NOTES
"This was an audio and video enabled telemedicine encounter. Patient verbally consented to visit. Patient was located at Vehicle and I was located at [facility]  location.     Chief Complaint  Dental Pain    Subjective    History of Present Illness      Patient presents to [facility] for   History of Present Illness     Review of Systems   Constitutional: Negative.    HENT: Negative.    Eyes: Negative.    Respiratory: Negative.    Cardiovascular: Negative.    Gastrointestinal: Negative.    Endocrine: Negative.    Genitourinary: Negative.    Musculoskeletal: Negative.    Skin: Negative.    Allergic/Immunologic: Negative.    Neurological: Negative.    Hematological: Negative.    Psychiatric/Behavioral: Negative.        I have reviewed and agree with the HPI and ROS information as above.  Hortensia Vieira, APRN     Objective   Vital Signs:   Ht 154.9 cm (61\")   Wt 87.1 kg (192 lb)   BMI 36.28 kg/m²         Physical Exam  Vitals and nursing note reviewed.   Constitutional:       Appearance: Normal appearance. She is well-developed.   HENT:      Head: Normocephalic and atraumatic.      Mouth/Throat:      Lips: Pink. No lesions.   Eyes:      General: Lids are normal. Vision grossly intact.      Conjunctiva/sclera: Conjunctivae normal.      Right eye: Right conjunctiva is not injected.      Left eye: Left conjunctiva is not injected.   Pulmonary:      Effort: Pulmonary effort is normal.   Musculoskeletal:         General: Normal range of motion.      Cervical back: Full passive range of motion without pain, normal range of motion and neck supple.   Skin:     General: Skin is dry.   Neurological:      Mental Status: She is alert and oriented to person, place, and time.      Motor: Motor function is intact.   Psychiatric:         Mood and Affect: Mood and affect normal.         Judgment: Judgment normal.          JING-7:      PHQ-2 Depression Screening  Little interest or pleasure in " doing things?     Feeling down, depressed, or hopeless?     PHQ-2 Total Score       PHQ-9 Depression Screening  Little interest or pleasure in doing things?     Feeling down, depressed, or hopeless?     Trouble falling or staying asleep, or sleeping too much?     Feeling tired or having little energy?     Poor appetite or overeating?     Feeling bad about yourself - or that you are a failure or have let yourself or your family down?     Trouble concentrating on things, such as reading the newspaper or watching television?     Moving or speaking so slowly that other people could have noticed? Or the opposite - being so fidgety or restless that you have been moving around a lot more than usual?     Thoughts that you would be better off dead, or of hurting yourself in some way?     PHQ-9 Total Score     If you checked off any problems, how difficult have these problems made it for you to do your work, take care of things at home, or get along with other people?        Result Review  Data Reviewed:              Assessment and Plan      Problem List Items Addressed This Visit        Mental Health    Adult attention deficit hyperactivity disorder - Primary    Relevant Medications    amoxicillin-clavulanate (Augmentin) 875-125 MG per tablet    ibuprofen (ADVIL,MOTRIN) 800 MG tablet        Patient being seen through telehealth today with complaints of dental pain.  She states her gums are swollen and red.  Symptoms began yesterday.  She states she has not been able to sleep well due to the pain.  She states the pain is located to the bottom of the right side.  She is not established with a dentist at this time.    Plan:    1.  Start Augmentin twice a day for 10 days.  2.  Ibuprofen sent.  3.  Recommended warm salt water swishes and to get an appointment is as possible with the dentist.  4.  Follow-up as needed if symptoms do not improve or become worse.      Follow Up   Return if symptoms worsen or fail to improve.  Patient  was given instructions and counseling regarding her condition or for health maintenance advice. Please see specific information pulled into the AVS if appropriate.

## 2022-07-14 ENCOUNTER — TELEPHONE (OUTPATIENT)
Dept: FAMILY MEDICINE CLINIC | Facility: CLINIC | Age: 52
End: 2022-07-14

## 2022-07-14 ENCOUNTER — HOSPITAL ENCOUNTER (OUTPATIENT)
Dept: CARDIOLOGY | Facility: HOSPITAL | Age: 52
Discharge: HOME | End: 2022-07-14

## 2022-07-14 VITALS
BODY MASS INDEX: 36.25 KG/M2 | SYSTOLIC BLOOD PRESSURE: 122 MMHG | WEIGHT: 192 LBS | DIASTOLIC BLOOD PRESSURE: 78 MMHG | HEIGHT: 61 IN

## 2022-07-14 DIAGNOSIS — R06.02 SHORTNESS OF BREATH: ICD-10-CM

## 2022-07-14 DIAGNOSIS — R53.83 FATIGUE, UNSPECIFIED TYPE: ICD-10-CM

## 2022-07-14 PROCEDURE — 93306 TTE W/DOPPLER COMPLETE: CPT

## 2022-07-14 PROCEDURE — 93306 TTE W/DOPPLER COMPLETE: CPT | Performed by: INTERNAL MEDICINE

## 2022-07-15 ENCOUNTER — LAB (OUTPATIENT)
Dept: LAB | Facility: HOSPITAL | Age: 52
End: 2022-07-15

## 2022-07-15 LAB
ALBUMIN SERPL-MCNC: 4.5 G/DL (ref 3.5–5)
ALBUMIN/GLOB SERPL: 1.3 G/DL (ref 1.1–2.5)
ALP SERPL-CCNC: 193 U/L (ref 24–120)
ALT SERPL W P-5'-P-CCNC: 27 U/L (ref 0–35)
ANION GAP SERPL CALCULATED.3IONS-SCNC: 3 MMOL/L (ref 4–13)
AST SERPL-CCNC: 29 U/L (ref 7–45)
AUTO MIXED CELLS #: 0.4 10*3/MM3 (ref 0.1–2.6)
AUTO MIXED CELLS %: 5.9 % (ref 0.1–24)
BH CV ECHO MEAS - AO MAX PG: 9.1 MMHG
BH CV ECHO MEAS - AO MEAN PG: 4 MMHG
BH CV ECHO MEAS - AO ROOT DIAM: 3.3 CM
BH CV ECHO MEAS - AO V2 MAX: 151 CM/SEC
BH CV ECHO MEAS - AO V2 VTI: 30 CM
BH CV ECHO MEAS - AVA(I,D): 1.46 CM2
BH CV ECHO MEAS - EDV(CUBED): 96.1 ML
BH CV ECHO MEAS - EDV(MOD-SP2): 70 ML
BH CV ECHO MEAS - EDV(MOD-SP4): 57.7 ML
BH CV ECHO MEAS - EF(MOD-BP): 60 %
BH CV ECHO MEAS - EF(MOD-SP2): 60.7 %
BH CV ECHO MEAS - EF(MOD-SP4): 57 %
BH CV ECHO MEAS - ESV(CUBED): 32.2 ML
BH CV ECHO MEAS - ESV(MOD-SP2): 27.5 ML
BH CV ECHO MEAS - ESV(MOD-SP4): 24.8 ML
BH CV ECHO MEAS - FS: 30.6 %
BH CV ECHO MEAS - IVS/LVPW: 1.41 CM
BH CV ECHO MEAS - IVSD: 0.96 CM
BH CV ECHO MEAS - LA DIMENSION: 3.6 CM
BH CV ECHO MEAS - LV DIASTOLIC VOL/BSA (35-75): 31.1 CM2
BH CV ECHO MEAS - LV MASS(C)D: 120.1 GRAMS
BH CV ECHO MEAS - LV MAX PG: 5 MMHG
BH CV ECHO MEAS - LV MEAN PG: 2 MMHG
BH CV ECHO MEAS - LV SYSTOLIC VOL/BSA (12-30): 13.4 CM2
BH CV ECHO MEAS - LV V1 MAX: 112 CM/SEC
BH CV ECHO MEAS - LV V1 VTI: 21.7 CM
BH CV ECHO MEAS - LVIDD: 4.6 CM
BH CV ECHO MEAS - LVIDS: 3.2 CM
BH CV ECHO MEAS - LVOT AREA: 2.01 CM2
BH CV ECHO MEAS - LVOT DIAM: 1.6 CM
BH CV ECHO MEAS - LVPWD: 0.68 CM
BH CV ECHO MEAS - MR MAX PG: 12.2 MMHG
BH CV ECHO MEAS - MR MAX VEL: 174.5 CM/SEC
BH CV ECHO MEAS - MV A MAX VEL: 52.9 CM/SEC
BH CV ECHO MEAS - MV DEC SLOPE: 286 CM/SEC2
BH CV ECHO MEAS - MV DEC TIME: 0.2 MSEC
BH CV ECHO MEAS - MV E MAX VEL: 80 CM/SEC
BH CV ECHO MEAS - MV E/A: 1.51
BH CV ECHO MEAS - MV P1/2T: 76.5 MSEC
BH CV ECHO MEAS - MVA(P1/2T): 2.9 CM2
BH CV ECHO MEAS - PA V2 MAX: 109.5 CM/SEC
BH CV ECHO MEAS - RAP SYSTOLE: 5 MMHG
BH CV ECHO MEAS - SI(MOD-SP2): 22.9 ML/M2
BH CV ECHO MEAS - SI(MOD-SP4): 17.7 ML/M2
BH CV ECHO MEAS - SV(LVOT): 43.6 ML
BH CV ECHO MEAS - SV(MOD-SP2): 42.5 ML
BH CV ECHO MEAS - SV(MOD-SP4): 32.9 ML
BILIRUB SERPL-MCNC: 0.4 MG/DL (ref 0.1–1)
BUN SERPL-MCNC: 13 MG/DL (ref 5–21)
BUN/CREAT SERPL: 15.9
CALCIUM SPEC-SCNC: 9.7 MG/DL (ref 8.4–10.4)
CHLORIDE SERPL-SCNC: 106 MMOL/L (ref 98–110)
CO2 SERPL-SCNC: 32 MMOL/L (ref 24–31)
CREAT SERPL-MCNC: 0.82 MG/DL (ref 0.5–1.4)
EGFRCR SERPLBLD CKD-EPI 2021: 86.7 ML/MIN/1.73
ERYTHROCYTE [DISTWIDTH] IN BLOOD BY AUTOMATED COUNT: 13.1 % (ref 12.3–15.4)
GLOBULIN UR ELPH-MCNC: 3.4 GM/DL
GLUCOSE SERPL-MCNC: 130 MG/DL (ref 70–100)
HCT VFR BLD AUTO: 39.5 % (ref 34–46.6)
HGB BLD-MCNC: 13.1 G/DL (ref 12–15.9)
LEFT ATRIUM VOLUME INDEX: 17.9 ML/M2
LEFT ATRIUM VOLUME: 33.3 ML
LYMPHOCYTES # BLD AUTO: 1.5 10*3/MM3 (ref 0.7–3.1)
LYMPHOCYTES NFR BLD AUTO: 25.4 % (ref 19.6–45.3)
MAXIMAL PREDICTED HEART RATE: 169 BPM
MCH RBC QN AUTO: 28.4 PG (ref 26.6–33)
MCHC RBC AUTO-ENTMCNC: 33.2 G/DL (ref 31.5–35.7)
MCV RBC AUTO: 85.7 FL (ref 79–97)
NEUTROPHILS NFR BLD AUTO: 4.1 10*3/MM3 (ref 1.7–7)
NEUTROPHILS NFR BLD AUTO: 68.7 % (ref 42.7–76)
PLATELET # BLD AUTO: 210 10*3/MM3 (ref 140–450)
PMV BLD AUTO: 10.3 FL (ref 6–12)
POTASSIUM SERPL-SCNC: 4.2 MMOL/L (ref 3.5–5.3)
PROT SERPL-MCNC: 7.9 G/DL (ref 6.3–8.7)
RBC # BLD AUTO: 4.61 10*6/MM3 (ref 3.77–5.28)
SODIUM SERPL-SCNC: 141 MMOL/L (ref 135–145)
STRESS TARGET HR: 144 BPM
T4 FREE SERPL-MCNC: 1.02 NG/DL (ref 0.93–1.7)
TSH SERPL DL<=0.05 MIU/L-ACNC: <0.005 UIU/ML (ref 0.27–4.2)
WBC NRBC COR # BLD: 6 10*3/MM3 (ref 3.4–10.8)

## 2022-07-15 PROCEDURE — 36415 COLL VENOUS BLD VENIPUNCTURE: CPT | Performed by: INTERNAL MEDICINE

## 2022-07-15 PROCEDURE — 84439 ASSAY OF FREE THYROXINE: CPT | Performed by: INTERNAL MEDICINE

## 2022-07-15 PROCEDURE — 84481 FREE ASSAY (FT-3): CPT | Performed by: INTERNAL MEDICINE

## 2022-07-15 PROCEDURE — 80050 GENERAL HEALTH PANEL: CPT | Performed by: INTERNAL MEDICINE

## 2022-07-16 LAB — T3FREE SERPL-MCNC: 6.04 PG/ML (ref 2–4.4)

## 2022-07-18 DIAGNOSIS — E05.90 HYPERTHYROIDISM: Primary | ICD-10-CM

## 2022-07-19 ENCOUNTER — TELEPHONE (OUTPATIENT)
Dept: FAMILY MEDICINE CLINIC | Facility: CLINIC | Age: 52
End: 2022-07-19

## 2022-07-19 NOTE — TELEPHONE ENCOUNTER
----- Message from SPENSER Baires sent at 7/15/2022  4:30 PM CDT -----  Please let patient know that ECHO results show  Left ventricular ejection fraction appears to be 56-60% which is normal   Normal right ventricular size and systolic function  All valves appear wnl     Please have patient to continue to see cardiology when scheduled

## 2022-09-09 ENCOUNTER — LAB (OUTPATIENT)
Dept: LAB | Facility: HOSPITAL | Age: 52
End: 2022-09-09

## 2022-09-09 PROCEDURE — 84481 FREE ASSAY (FT-3): CPT | Performed by: INTERNAL MEDICINE

## 2022-09-09 PROCEDURE — 36415 COLL VENOUS BLD VENIPUNCTURE: CPT | Performed by: INTERNAL MEDICINE

## 2022-09-09 PROCEDURE — 84439 ASSAY OF FREE THYROXINE: CPT | Performed by: INTERNAL MEDICINE

## 2022-09-09 PROCEDURE — 84443 ASSAY THYROID STIM HORMONE: CPT | Performed by: INTERNAL MEDICINE

## 2022-09-10 LAB
T3FREE SERPL-MCNC: 3.16 PG/ML (ref 2–4.4)
T4 FREE SERPL-MCNC: 0.42 NG/DL (ref 0.93–1.7)
TSH SERPL DL<=0.05 MIU/L-ACNC: 0.02 UIU/ML (ref 0.27–4.2)

## 2022-09-15 DIAGNOSIS — E05.90 HYPERTHYROIDISM: Primary | ICD-10-CM

## 2022-10-17 ENCOUNTER — LAB (OUTPATIENT)
Dept: LAB | Facility: HOSPITAL | Age: 52
End: 2022-10-17

## 2022-10-17 ENCOUNTER — TELEMEDICINE (OUTPATIENT)
Dept: ENDOCRINOLOGY | Facility: CLINIC | Age: 52
End: 2022-10-17

## 2022-10-17 DIAGNOSIS — E05.00 GRAVES' DISEASE: ICD-10-CM

## 2022-10-17 DIAGNOSIS — E05.90 HYPERTHYROIDISM: Primary | ICD-10-CM

## 2022-10-17 DIAGNOSIS — I10 ESSENTIAL HYPERTENSION: ICD-10-CM

## 2022-10-17 PROCEDURE — 99214 OFFICE O/P EST MOD 30 MIN: CPT | Performed by: INTERNAL MEDICINE

## 2022-10-17 NOTE — PROGRESS NOTES
Nicolas Amaro is a 52 y.o. female who presents for  evaluation of   Graves' Disease                                      Mode of Visit: Video  Location of patient: Home  You have chosen to receive care through a telehealth visit.  Does the patient consent to use a video/audio connection for your medical care today? Yes  The visit included audio and video interaction. No technical issues occurred during this visit      Referring provider  Primary Care Provider    Donny Rincon MD    Duration 10 years    Timing - Graves' Disease is constant    Started as Hypothyroidism , transitioned to hyperthyroidism     Quality -  Improved on methimazole      Complications - Thyroid storm in the past    Current symptoms/problems  Palpitations - no longer  Feels better     Objective:     Physical Exam   HENT:   Head: Normocephalic.   Pulmonary/Chest: Effort normal and breath sounds normal.   Abdominal: Normal appearance.   Musculoskeletal:      Right lower leg: No edema.      Left lower leg: No edema.   Neurological: She is alert.         Lab Results   Component Value Date    WBC 6.00 07/15/2022    HGB 13.1 07/15/2022    HCT 39.5 07/15/2022    MCV 85.7 07/15/2022     07/15/2022     Lab Results   Component Value Date    GLUCOSE 130 (H) 07/15/2022    BUN 13 07/15/2022    CREATININE 0.82 07/15/2022    EGFRIFNONA 81 02/07/2022    BCR 15.9 07/15/2022    K 4.2 07/15/2022    CO2 32.0 (H) 07/15/2022    CALCIUM 9.7 07/15/2022    ALBUMIN 4.50 07/15/2022    AST 29 07/15/2022    ALT 27 07/15/2022     Thyroid Workup    Lab Results   Component Value Date    TSH 0.024 (L) 09/09/2022    TSH <0.005 (L) 07/15/2022    TSH <0.005 (L) 06/16/2022       Lab Results   Component Value Date    FREET4 0.42 (L) 09/09/2022    FREET4 1.02 07/15/2022    FREET4 2.98 (H) 06/16/2022       Lab Results   Component Value Date    T3FREE 3.16 09/09/2022    T3FREE 6.04 (H) 07/15/2022    T3FREE 11.50 (H) 06/16/2022       TPO antibodies    Lab Results    Component Value Date    THYROIDAB >600 (H) 06/16/2022    THYROIDAB >600 (H) 05/05/2022    THYROIDAB 403 (H) 02/07/2022       TSI antibodies    Lab Results   Component Value Date    THYRSTIMIMMU 20.10 (H) 06/16/2022    THYRSTIMIMMU 6.58 (H) 05/05/2022    THYRSTIMIMMU 0.35 02/07/2022                 Assessment & Plan       ICD-10-CM ICD-9-CM   1. Hyperthyroidism  E05.90 242.90   2. Graves' disease  E05.00 242.00   3. Essential hypertension  I10 401.9         Methimazole 15 mg daily - decreased to 10 mg daily   Pending assessment on this dose      TSI neg   TPO positive    atenolol 25 mg daily - keep using for HTN and anxiety / palpitations        Assess      psoariasis     Topical steroid    Kenalog injection, before     Arthralgias,     We ruled out celiac and sle in May 2017    --    Obesity , phentermine in addition to lifestyle changes , stopped   Now on vyvanse      --    Low iron    Taking ferrous sulfate, taking big , or add vit C     Obtain celiac     --    Perimenopause     ---    Vit D Def     50 th u monthly     --    Transaminitis    Most likely fatty liver, weight loss

## 2022-10-18 ENCOUNTER — TELEPHONE (OUTPATIENT)
Dept: FAMILY MEDICINE CLINIC | Facility: CLINIC | Age: 52
End: 2022-10-18

## 2022-10-18 DIAGNOSIS — Z12.39 ENCOUNTER FOR SCREENING BREAST EXAMINATION: Primary | ICD-10-CM

## 2022-10-18 DIAGNOSIS — Z12.11 ENCOUNTER FOR SCREENING COLONOSCOPY: ICD-10-CM

## 2022-10-18 NOTE — TELEPHONE ENCOUNTER
Patient returned our phone call and informed of she is overdue for her  mammogram & colorectal cancer screening. Asked patient if it is ok to place these orders. Patient agrees. Orders in and scheduling will call her to get them scheduled. JESSY

## 2022-10-19 ENCOUNTER — OFFICE VISIT (OUTPATIENT)
Dept: FAMILY MEDICINE CLINIC | Facility: CLINIC | Age: 52
End: 2022-10-19

## 2022-10-19 VITALS
DIASTOLIC BLOOD PRESSURE: 68 MMHG | HEIGHT: 61 IN | BODY MASS INDEX: 37.38 KG/M2 | WEIGHT: 198 LBS | HEART RATE: 80 BPM | SYSTOLIC BLOOD PRESSURE: 130 MMHG

## 2022-10-19 DIAGNOSIS — F90.2 ATTENTION DEFICIT HYPERACTIVITY DISORDER (ADHD), COMBINED TYPE: Primary | ICD-10-CM

## 2022-10-19 DIAGNOSIS — L40.9 PSORIASIS: ICD-10-CM

## 2022-10-19 DIAGNOSIS — F90.9 ADULT ATTENTION DEFICIT HYPERACTIVITY DISORDER: ICD-10-CM

## 2022-10-19 DIAGNOSIS — E66.01 CLASS 2 SEVERE OBESITY DUE TO EXCESS CALORIES WITH SERIOUS COMORBIDITY AND BODY MASS INDEX (BMI) OF 37.0 TO 37.9 IN ADULT: Primary | ICD-10-CM

## 2022-10-19 PROCEDURE — 99214 OFFICE O/P EST MOD 30 MIN: CPT | Performed by: NURSE PRACTITIONER

## 2022-10-19 NOTE — PROGRESS NOTES
"Chief Complaint  ADHD    Subjective    History of Present Illness {CC  Problem List  Visit Diagnosis   Encounters  Notes  Medications  Labs  Result Review Imaging  Media :23}     Patient presents to [facility] for   History of Present Illness  Pt is here today for ADHD follow-up.  Pt was prescribed Vyvanse, but hasn't taken the medication for a few months due to Thyroid issues.  Pt stated she would like to start taking the Vyvanse again.  Pt has also been experiencing anxiety.     ADHD/Mood HPI    Visit for:  follow-up. Most recent visit was 4 months ago.  Interim changes to follow up on today: Pt has not taken her ADHD med (Vyvanse) in a few months due to Thyroid issues and would like to restart the Vyvanse  Work/School Performance:  struggling and work performance  Cognitive:  unable to focus    Behavior  Hyperactivity: is not hyperactive  Impulsivity: no impulsivity and no unsafe behavior  Tasking: difficulty initiating tasks and difficulty completing tasks    Social  ADHD social/impulsive symptoms:  not impatient, does not blurt out inappropriate comments and no excessive talking    Behavioral health  Behavior: anxious behavior  Emotional coping: demonstrates feelings of anxiety       Review of Systems    I have reviewed and agree with the HPI and ROS information as above.  Emelina Puente, APRN     Objective   Vital Signs:   /68   Pulse 80   Ht 154.9 cm (61\")   Wt 89.8 kg (198 lb)   BMI 37.41 kg/m²     Class 2 Severe Obesity (BMI >=35 and <=39.9). Obesity-related health conditions include the following: hypertension, coronary heart disease, diabetes mellitus, dyslipidemias and GERD. Obesity is unchanged. BMI is is above average; BMI management plan is completed. We discussed low calorie, low carb based diet program, portion control and increasing exercise.      Physical Exam  Constitutional:       Appearance: Normal appearance. She is well-developed. " She is obese.   HENT:      Head: Normocephalic and atraumatic.      Right Ear: External ear normal.      Left Ear: External ear normal.      Nose: Nose normal. No nasal tenderness or congestion.      Mouth/Throat:      Lips: Pink. No lesions.      Mouth: Mucous membranes are moist. No oral lesions.      Dentition: Normal dentition.      Pharynx: Oropharynx is clear. No pharyngeal swelling, oropharyngeal exudate or posterior oropharyngeal erythema.   Eyes:      General: Lids are normal. Vision grossly intact. No scleral icterus.        Right eye: No discharge.         Left eye: No discharge.      Extraocular Movements: Extraocular movements intact.      Conjunctiva/sclera: Conjunctivae normal.      Right eye: Right conjunctiva is not injected.      Left eye: Left conjunctiva is not injected.      Pupils: Pupils are equal, round, and reactive to light.   Cardiovascular:      Rate and Rhythm: Normal rate and regular rhythm.      Heart sounds: Normal heart sounds. No murmur heard.    No gallop.   Pulmonary:      Effort: Pulmonary effort is normal.      Breath sounds: Normal breath sounds and air entry. No wheezing, rhonchi or rales.   Musculoskeletal:         General: No tenderness or deformity. Normal range of motion.      Cervical back: Full passive range of motion without pain, normal range of motion and neck supple.      Right lower leg: No edema.      Left lower leg: No edema.   Skin:     General: Skin is warm and dry.      Coloration: Skin is not jaundiced.      Findings: No rash.   Neurological:      Mental Status: She is alert and oriented to person, place, and time.      Cranial Nerves: Cranial nerves are intact.      Sensory: Sensation is intact.      Motor: Motor function is intact.      Coordination: Coordination is intact.      Gait: Gait is intact.   Psychiatric:         Attention and Perception: Attention normal.         Mood and Affect: Mood and affect normal.         Behavior: Behavior is not  hyperactive. Behavior is cooperative.         Thought Content: Thought content normal.         Judgment: Judgment normal.          JING-7: Over the last two weeks, how often have you been bothered by the following problems?  Feeling nervous, anxious or on edge: Nearly every day  Not being able to stop or control worrying: Several days  Worrying too much about different things: Not at all  Trouble Relaxing: Several days  Being so restless that it is hard to sit still: More than half the days  Becoming easily annoyed or irritable: Not at all  Feeling afraid as if something awful might happen: Several days  JING 7 Total Score: 8  If you checked any problems, how difficult have these problems made it for you to do your work, take care of things at home, or get along with other people: Somewhat difficult    PHQ-2 Depression Screening  Little interest or pleasure in doing things? 0-->not at all   Feeling down, depressed, or hopeless? 0-->not at all   PHQ-2 Total Score 0     PHQ-9 Depression Screening  Little interest or pleasure in doing things? 0-->not at all   Feeling down, depressed, or hopeless? 0-->not at all   Trouble falling or staying asleep, or sleeping too much?     Feeling tired or having little energy?     Poor appetite or overeating?     Feeling bad about yourself - or that you are a failure or have let yourself or your family down?     Trouble concentrating on things, such as reading the newspaper or watching television?     Moving or speaking so slowly that other people could have noticed? Or the opposite - being so fidgety or restless that you have been moving around a lot more than usual?     Thoughts that you would be better off dead, or of hurting yourself in some way?     PHQ-9 Total Score 0   If you checked off any problems, how difficult have these problems made it for you to do your work, take care of things at home, or get along with other people?        Result Review  Data Reviewed:                    Assessment and Plan      Problem List Items Addressed This Visit        Mental Health    Adult attention deficit hyperactivity disorder       Skin    Psoriasis    Relevant Orders    Ambulatory Referral to Dermatology   Other Visit Diagnoses     Class 2 severe obesity due to excess calories with serious comorbidity and body mass index (BMI) of 37.0 to 37.9 in adult (HCC)    -  Primary      Patient comes in today to follow-up on several things.  First off patient is wanting to get back on her ADHD medication.  She does well on the Vyvanse.  She is having some anxiety but states that she takes the BuSpar and she has been on it now that she is started it back she is doing better so she just wishes to continue the same on it.  Drug screen is up-to-date.  Freedom is clean.    Patient is wanting to discuss new psoriasis medications.  I have never heard of the specific medication and patient has not seen dermatology due to the fact that no one in town would see her.  I did recommend a Derm referral and she is okay with this.    Patient is also interested in Saxenda for weight loss.  I did explain to her that her insurance would not cover this but she states that she would qualify for patient assistance.  I am unsure if this is the case but patient does have significant thyroid history so I recommended she first get clearance with her endocrinologist on even taking this medication but if they give the clearance then I am okay with prescribing it.        Follow Up   Return in about 3 months (around 1/19/2023).  Patient was given instructions and counseling regarding her condition or for health maintenance advice. Please see specific information pulled into the AVS if appropriate.

## 2022-10-24 ENCOUNTER — TELEPHONE (OUTPATIENT)
Dept: ENDOCRINOLOGY | Facility: CLINIC | Age: 52
End: 2022-10-24

## 2022-11-16 ENCOUNTER — TELEPHONE (OUTPATIENT)
Dept: FAMILY MEDICINE CLINIC | Facility: CLINIC | Age: 52
End: 2022-11-16

## 2022-12-06 ENCOUNTER — TELEPHONE (OUTPATIENT)
Dept: FAMILY MEDICINE CLINIC | Facility: CLINIC | Age: 52
End: 2022-12-06

## 2022-12-06 NOTE — TELEPHONE ENCOUNTER
Caller: Nicolas Amaro    Relationship: Self    Best call back number: [phone]         What test was performed: LABS; HEPATITIS B    When was the test performed: 11/30/22    Where was the test performed:  [facility]    Additional notes: THE PATIENT STATES THAT HER HEPATITIS B LAB ON 11/30/22 WAS REACTIVE AND WOULD LIKE TO KNOW WHAT THAT MEANS.

## 2022-12-20 DIAGNOSIS — F90.2 ATTENTION DEFICIT HYPERACTIVITY DISORDER (ADHD), COMBINED TYPE: ICD-10-CM

## 2022-12-20 NOTE — TELEPHONE ENCOUNTER
Caller: Nicolas Amaro ADITYA    Relationship: Self    Best call back number: [phone]  Requested Prescriptions:   Requested Prescriptions     Pending Prescriptions Disp Refills   • lisdexamfetamine (Vyvanse) 40 MG capsule 30 capsule 0     Sig: Take 1 capsule by mouth Every Morning for 30 days        Pharmacy where request should be sent:      [id number] - [id number] AT [facility] [address] - [phone]  - [phone] FX        Additional details provided by patient: OUT OF MEDICATION    Does the patient have less than a 3 day supply:  [x] Yes  [] No    Would you like a call back once the refill request has been completed: [x] Yes [] No    If the office needs to give you a call back, can they leave a voicemail: [] Yes [] No    Valerie Gaston Rep   12/20/22 15:24 CST

## 2022-12-28 ENCOUNTER — TELEPHONE (OUTPATIENT)
Dept: ENDOCRINOLOGY | Facility: CLINIC | Age: 52
End: 2022-12-28
Payer: COMMERCIAL

## 2022-12-28 DIAGNOSIS — E05.90 HYPERTHYROIDISM: Primary | ICD-10-CM

## 2022-12-28 NOTE — TELEPHONE ENCOUNTER
Patient is requesting lab orders be put in for her. She thinks her thyroid meds are not at the right dose. Hair thinning and fatigue, and cold.     Thank you

## 2022-12-29 ENCOUNTER — LAB (OUTPATIENT)
Dept: LAB | Facility: HOSPITAL | Age: 52
End: 2022-12-29
Payer: COMMERCIAL

## 2022-12-29 LAB
AUTO MIXED CELLS #: 0.4 10*3/MM3 (ref 0.1–2.6)
AUTO MIXED CELLS %: 5.9 % (ref 0.1–24)
ERYTHROCYTE [DISTWIDTH] IN BLOOD BY AUTOMATED COUNT: 13.2 % (ref 12.3–15.4)
HCT VFR BLD AUTO: 39.5 % (ref 34–46.6)
HGB BLD-MCNC: 13 G/DL (ref 12–15.9)
LYMPHOCYTES # BLD AUTO: 1.9 10*3/MM3 (ref 0.7–3.1)
LYMPHOCYTES NFR BLD AUTO: 26.4 % (ref 19.6–45.3)
MCH RBC QN AUTO: 30 PG (ref 26.6–33)
MCHC RBC AUTO-ENTMCNC: 32.9 G/DL (ref 31.5–35.7)
MCV RBC AUTO: 91.2 FL (ref 79–97)
NEUTROPHILS NFR BLD AUTO: 5 10*3/MM3 (ref 1.7–7)
NEUTROPHILS NFR BLD AUTO: 67.7 % (ref 42.7–76)
PLATELET # BLD AUTO: 211 10*3/MM3 (ref 140–450)
PMV BLD AUTO: 10.9 FL (ref 6–12)
RBC # BLD AUTO: 4.33 10*6/MM3 (ref 3.77–5.28)
WBC NRBC COR # BLD: 7.3 10*3/MM3 (ref 3.4–10.8)

## 2022-12-29 PROCEDURE — 36415 COLL VENOUS BLD VENIPUNCTURE: CPT | Performed by: INTERNAL MEDICINE

## 2022-12-29 PROCEDURE — 84445 ASSAY OF TSI GLOBULIN: CPT | Performed by: INTERNAL MEDICINE

## 2022-12-29 PROCEDURE — 84439 ASSAY OF FREE THYROXINE: CPT | Performed by: INTERNAL MEDICINE

## 2022-12-29 PROCEDURE — 86376 MICROSOMAL ANTIBODY EACH: CPT | Performed by: INTERNAL MEDICINE

## 2022-12-29 PROCEDURE — 84481 FREE ASSAY (FT-3): CPT | Performed by: INTERNAL MEDICINE

## 2022-12-29 PROCEDURE — 80050 GENERAL HEALTH PANEL: CPT | Performed by: INTERNAL MEDICINE

## 2022-12-30 LAB
ALBUMIN SERPL-MCNC: 4.5 G/DL (ref 3.5–5.2)
ALBUMIN/GLOB SERPL: 1.6 G/DL
ALP SERPL-CCNC: 152 U/L (ref 39–117)
ALT SERPL W P-5'-P-CCNC: 13 U/L (ref 1–33)
ANION GAP SERPL CALCULATED.3IONS-SCNC: 11 MMOL/L (ref 5–15)
AST SERPL-CCNC: 14 U/L (ref 1–32)
BILIRUB SERPL-MCNC: 0.3 MG/DL (ref 0–1.2)
BUN SERPL-MCNC: 14 MG/DL (ref 6–20)
BUN/CREAT SERPL: 16.5 (ref 7–25)
CALCIUM SPEC-SCNC: 9.3 MG/DL (ref 8.6–10.5)
CHLORIDE SERPL-SCNC: 103 MMOL/L (ref 98–107)
CO2 SERPL-SCNC: 28 MMOL/L (ref 22–29)
CREAT SERPL-MCNC: 0.85 MG/DL (ref 0.57–1)
EGFRCR SERPLBLD CKD-EPI 2021: 83.1 ML/MIN/1.73
GLOBULIN UR ELPH-MCNC: 2.8 GM/DL
GLUCOSE SERPL-MCNC: 81 MG/DL (ref 65–99)
POTASSIUM SERPL-SCNC: 3.9 MMOL/L (ref 3.5–5.2)
PROT SERPL-MCNC: 7.3 G/DL (ref 6–8.5)
SODIUM SERPL-SCNC: 142 MMOL/L (ref 136–145)
T3FREE SERPL-MCNC: 2.4 PG/ML (ref 2–4.4)
T4 FREE SERPL-MCNC: 0.99 NG/DL (ref 0.93–1.7)
TSH SERPL DL<=0.05 MIU/L-ACNC: 1.22 UIU/ML (ref 0.27–4.2)

## 2022-12-31 LAB — THYROPEROXIDASE AB SERPL-ACNC: >600 IU/ML (ref 0–34)

## 2023-01-03 LAB — TSI SER-ACNC: 4.31 IU/L (ref 0–0.55)

## 2023-01-04 DIAGNOSIS — E05.90 HYPERTHYROIDISM: Primary | ICD-10-CM

## 2023-01-16 ENCOUNTER — TELEPHONE (OUTPATIENT)
Dept: FAMILY MEDICINE CLINIC | Facility: CLINIC | Age: 53
End: 2023-01-16
Payer: COMMERCIAL

## 2023-01-20 DIAGNOSIS — F90.9 ADULT ATTENTION DEFICIT HYPERACTIVITY DISORDER: ICD-10-CM

## 2023-01-20 DIAGNOSIS — F41.9 ANXIETY: ICD-10-CM

## 2023-01-20 RX ORDER — BUSPIRONE HYDROCHLORIDE 5 MG/1
TABLET ORAL
Qty: 90 TABLET | Refills: 0 | Status: SHIPPED | OUTPATIENT
Start: 2023-01-20 | End: 2023-02-11 | Stop reason: SDUPTHER

## 2023-01-20 RX ORDER — IBUPROFEN 800 MG/1
TABLET ORAL
Qty: 90 TABLET | Refills: 0 | Status: SHIPPED | OUTPATIENT
Start: 2023-01-20

## 2023-01-30 DIAGNOSIS — F90.2 ATTENTION DEFICIT HYPERACTIVITY DISORDER (ADHD), COMBINED TYPE: ICD-10-CM

## 2023-01-30 NOTE — TELEPHONE ENCOUNTER
Caller: Nicolas Amaro ADITYA    Relationship: Self    Best call back number: [phone]    Requested Prescriptions:   Requested Prescriptions     Pending Prescriptions Disp Refills   • lisdexamfetamine (Vyvanse) 40 MG capsule 30 capsule 0     Sig: Take 1 capsule by mouth Every Morning for 30 days        Pharmacy where request should be sent: [facility] #[id number] - [id number] AT [facility] & [facility] [address] - [phone]  - [phone] FX     Additional details provided by patient: TOTALLY OUT    Does the patient have less than a 3 day supply:  [x] Yes  [] No    Would you like a call back once the refill request has been completed: [x] Yes [] No    If the office needs to give you a call back, can they leave a voicemail: [x] Yes [] No    Valerie Paniagua Rep   01/30/23 10:02 CST

## 2023-02-01 ENCOUNTER — NURSE TRIAGE (OUTPATIENT)
Dept: CALL CENTER | Facility: HOSPITAL | Age: 53
End: 2023-02-01
Payer: COMMERCIAL

## 2023-02-01 DIAGNOSIS — F90.2 ATTENTION DEFICIT HYPERACTIVITY DISORDER (ADHD), COMBINED TYPE: ICD-10-CM

## 2023-02-01 NOTE — TELEPHONE ENCOUNTER
"    Reason for Disposition  • Caller requesting a CONTROLLED substance prescription refill (e.g., narcotics, ADHD medicines)    Additional Information  • Negative: New-onset or worsening symptoms, see that guideline  (e.g., diarrhea, runny nose, sore throat)  • Negative: Medicine question not related to refill or renewal  • Negative: Caller requesting information unrelated to medicine  • Negative: [1] Prescription refill request for ESSENTIAL medicine (i.e., likelihood of harm to patient if not taken) AND [2] triager unable to refill per department policy  • Negative: [1] Prescription not at pharmacy AND [2] was prescribed by PCP recently (Exception: triager has access to EMR and prescription is recorded there. Go to Home Care and confirm for pharmacy.)  • Negative: [1] Pharmacy calling with prescription questions AND [2] triager unable to answer question  • Negative: Prescription request for new medicine (not a refill)    Answer Assessment - Initial Assessment Questions  1. DRUG NAME: \"What medicine do you need to have refilled?\"      Vyvanse  2. REFILLS REMAINING: \"How many refills are remaining?\" (Note: The label on the medicine or pill bottle will show how many refills are remaining. If there are no refills remaining, then a renewal may be needed.)      none  3. EXPIRATION DATE: \"What is the expiration date?\" (Note: The label states when the prescription will , and thus can no longer be refilled.)      none  4. PRESCRIBING HCP: \"Who prescribed it?\" Reason: If prescribed by specialist, call should be referred to that group.      Sergey  5. SYMPTOMS: \"Do you have any symptoms?\"      ADHD  6. PREGNANCY: \"Is there any chance that you are pregnant?\" \"When was your last menstrual period?\"      na    Protocols used: MEDICATION REFILL AND RENEWAL CALL-ADULT-      "

## 2023-02-11 ENCOUNTER — OFFICE VISIT (OUTPATIENT)
Dept: FAMILY MEDICINE CLINIC | Facility: CLINIC | Age: 53
End: 2023-02-11
Payer: COMMERCIAL

## 2023-02-11 VITALS
OXYGEN SATURATION: 98 % | BODY MASS INDEX: 36.25 KG/M2 | SYSTOLIC BLOOD PRESSURE: 137 MMHG | HEART RATE: 56 BPM | HEIGHT: 61 IN | TEMPERATURE: 97.2 F | DIASTOLIC BLOOD PRESSURE: 81 MMHG | WEIGHT: 192 LBS

## 2023-02-11 DIAGNOSIS — E66.9 CLASS 2 OBESITY WITH BODY MASS INDEX (BMI) OF 36.0 TO 36.9 IN ADULT, UNSPECIFIED OBESITY TYPE, UNSPECIFIED WHETHER SERIOUS COMORBIDITY PRESENT: ICD-10-CM

## 2023-02-11 DIAGNOSIS — F90.9 ADULT ATTENTION DEFICIT HYPERACTIVITY DISORDER: Primary | ICD-10-CM

## 2023-02-11 DIAGNOSIS — F41.9 ANXIETY: ICD-10-CM

## 2023-02-11 PROCEDURE — 99214 OFFICE O/P EST MOD 30 MIN: CPT

## 2023-02-11 RX ORDER — BUSPIRONE HYDROCHLORIDE 5 MG/1
5 TABLET ORAL 3 TIMES DAILY
Qty: 90 TABLET | Refills: 2 | Status: SHIPPED | OUTPATIENT
Start: 2023-02-11

## 2023-02-11 NOTE — PROGRESS NOTES
"Chief Complaint  ADHD (Medication refill on Vyvanse)    Subjective        Nicolas Amaro presents to [facility]  History of Present Illness  ADHD Medication refill          Objective   Vital Signs:  /81   Pulse 56   Temp 97.2 °F (36.2 °C)   Ht 154.9 cm (61\")   Wt 87.1 kg (192 lb)   SpO2 98%   BMI 36.28 kg/m²   Estimated body mass index is 36.28 kg/m² as calculated from the following:    Height as of this encounter: 154.9 cm (61\").    Weight as of this encounter: 87.1 kg (192 lb).       Class 2 Severe Obesity (BMI >=35 and <=39.9). Obesity-related health conditions include the following: none. Obesity is improving with lifestyle modifications. BMI is is above average; BMI management plan is completed. We discussed portion control and increasing exercise.      Physical Exam  Constitutional:       Appearance: Normal appearance. She is well-developed. She is obese.   HENT:      Head: Normocephalic and atraumatic.      Right Ear: External ear normal.      Left Ear: External ear normal.      Nose: Nose normal. No nasal tenderness or congestion.      Mouth/Throat:      Lips: Pink. No lesions.      Mouth: Mucous membranes are moist. No oral lesions.      Dentition: Normal dentition.      Pharynx: Oropharynx is clear. No pharyngeal swelling, oropharyngeal exudate or posterior oropharyngeal erythema.   Eyes:      General: Lids are normal. Vision grossly intact. No scleral icterus.        Right eye: No discharge.         Left eye: No discharge.      Extraocular Movements: Extraocular movements intact.      Conjunctiva/sclera: Conjunctivae normal.      Right eye: Right conjunctiva is not injected.      Left eye: Left conjunctiva is not injected.      Pupils: Pupils are equal, round, and reactive to light.   Cardiovascular:      Rate and Rhythm: Normal rate and regular rhythm.      Heart sounds: Normal heart sounds. No murmur heard.    No gallop.   Pulmonary:      Effort: Pulmonary " effort is normal.      Breath sounds: Normal breath sounds and air entry. No wheezing, rhonchi or rales.   Musculoskeletal:         General: No tenderness or deformity. Normal range of motion.      Cervical back: Full passive range of motion without pain, normal range of motion and neck supple.      Right lower leg: No edema.      Left lower leg: No edema.   Skin:     General: Skin is warm and dry.      Coloration: Skin is not jaundiced.      Findings: No rash.   Neurological:      Mental Status: She is alert and oriented to person, place, and time.      Sensory: Sensation is intact.      Motor: Motor function is intact.      Coordination: Coordination is intact.      Gait: Gait is intact.   Psychiatric:         Attention and Perception: Attention normal.         Mood and Affect: Mood and affect normal.         Behavior: Behavior is not hyperactive. Behavior is cooperative.         Thought Content: Thought content normal.         Judgment: Judgment normal.        Result Review :                   Assessment and Plan   Diagnoses and all orders for this visit:    1. Adult attention deficit hyperactivity disorder (Primary)    2. Anxiety  -     busPIRone (BUSPAR) 5 MG tablet; Take 1 tablet by mouth 3 (Three) Times a Day.  Dispense: 90 tablet; Refill: 2    3. Class 2 obesity with body mass index (BMI) of 36.0 to 36.9 in adult, unspecified obesity type, unspecified whether serious comorbidity present    Patient is seen today for adhd and anxiety. Patient is doing well on current medication regimen and wishes to continue same. Patient is UTD on UDS, and Freedom pending    Plan  1. ADHD stable with Vyvanse 40mg;   2. Anxiety controlled with Buspar PRN          Follow Up   Return in about 3 months (around 5/11/2023) for ADHD.  Patient was given instructions and counseling regarding her condition or for health maintenance advice. Please see specific information pulled into the AVS if appropriate.

## 2023-02-11 NOTE — PATIENT INSTRUCTIONS

## 2023-02-16 ENCOUNTER — TELEPHONE (OUTPATIENT)
Dept: FAMILY MEDICINE CLINIC | Facility: CLINIC | Age: 53
End: 2023-02-16
Payer: COMMERCIAL

## 2023-04-12 DIAGNOSIS — F90.2 ATTENTION DEFICIT HYPERACTIVITY DISORDER (ADHD), COMBINED TYPE: ICD-10-CM

## 2023-04-12 NOTE — TELEPHONE ENCOUNTER
Caller: Nicolas Amaro ADITYA    Relationship: Self    Best call back number: [phone]    Requested Prescriptions:   Requested Prescriptions     Pending Prescriptions Disp Refills   • lisdexamfetamine (Vyvanse) 40 MG capsule 30 capsule 0     Sig: Take 1 capsule by mouth Every Morning for 30 days        Pharmacy where request should be sent: [facility] #[id number] [place], [place] - [address] AT [facility] [address] - [phone]  - [phone] FX     Last office visit with prescribing clinician: Visit date not found   Last telemedicine visit with prescribing clinician: 5/5/2023   Next office visit with prescribing clinician: Visit date not found     Additional details provided by patient: COMPLETELY OUT    Does the patient have less than a 3 day supply:  [x] Yes  [] No    Would you like a call back once the refill request has been completed: [x] Yes [] No    If the office needs to give you a call back, can they leave a voicemail: [x] Yes [] No    Valerie Munoz Rep   04/12/23 13:28 CDT

## 2023-05-15 ENCOUNTER — LAB (OUTPATIENT)
Dept: LAB | Facility: HOSPITAL | Age: 53
End: 2023-05-15
Payer: COMMERCIAL

## 2023-05-15 LAB
ALBUMIN SERPL-MCNC: 4.3 G/DL (ref 3.5–5)
ALBUMIN/GLOB SERPL: 1.3 G/DL (ref 1.1–2.5)
ALP SERPL-CCNC: 133 U/L (ref 24–120)
ALT SERPL W P-5'-P-CCNC: 21 U/L (ref 0–35)
ANION GAP SERPL CALCULATED.3IONS-SCNC: 1 MMOL/L (ref 4–13)
AST SERPL-CCNC: 22 U/L (ref 7–45)
AUTO MIXED CELLS #: 0.4 10*3/MM3 (ref 0.1–2.6)
AUTO MIXED CELLS %: 6 % (ref 0.1–24)
BILIRUB SERPL-MCNC: 0.3 MG/DL (ref 0.1–1)
BUN SERPL-MCNC: 17 MG/DL (ref 5–21)
BUN/CREAT SERPL: 18.1
CALCIUM SPEC-SCNC: 9.1 MG/DL (ref 8.4–10.4)
CHLORIDE SERPL-SCNC: 107 MMOL/L (ref 98–110)
CO2 SERPL-SCNC: 31 MMOL/L (ref 24–31)
CREAT SERPL-MCNC: 0.94 MG/DL (ref 0.5–1.4)
EGFRCR SERPLBLD CKD-EPI 2021: 73.6 ML/MIN/1.73
ERYTHROCYTE [DISTWIDTH] IN BLOOD BY AUTOMATED COUNT: 12.7 % (ref 12.3–15.4)
GLOBULIN UR ELPH-MCNC: 3.3 GM/DL
GLUCOSE SERPL-MCNC: 100 MG/DL (ref 70–100)
HCT VFR BLD AUTO: 38.5 % (ref 34–46.6)
HGB BLD-MCNC: 12.8 G/DL (ref 12–15.9)
LYMPHOCYTES # BLD AUTO: 2 10*3/MM3 (ref 0.7–3.1)
LYMPHOCYTES NFR BLD AUTO: 32.2 % (ref 19.6–45.3)
MCH RBC QN AUTO: 30.8 PG (ref 26.6–33)
MCHC RBC AUTO-ENTMCNC: 33.2 G/DL (ref 31.5–35.7)
MCV RBC AUTO: 92.8 FL (ref 79–97)
NEUTROPHILS NFR BLD AUTO: 3.8 10*3/MM3 (ref 1.7–7)
NEUTROPHILS NFR BLD AUTO: 61.8 % (ref 42.7–76)
PLATELET # BLD AUTO: 194 10*3/MM3 (ref 140–450)
PMV BLD AUTO: 10.4 FL (ref 6–12)
POTASSIUM SERPL-SCNC: 4.4 MMOL/L (ref 3.5–5.3)
PROT SERPL-MCNC: 7.6 G/DL (ref 6.3–8.7)
RBC # BLD AUTO: 4.15 10*6/MM3 (ref 3.77–5.28)
SODIUM SERPL-SCNC: 139 MMOL/L (ref 135–145)
T3FREE SERPL-MCNC: 2.71 PG/ML (ref 2–4.4)
T4 FREE SERPL-MCNC: 0.88 NG/DL (ref 0.93–1.7)
TSH SERPL DL<=0.05 MIU/L-ACNC: 11.15 UIU/ML (ref 0.27–4.2)
WBC NRBC COR # BLD: 6.2 10*3/MM3 (ref 3.4–10.8)

## 2023-05-15 PROCEDURE — 84481 FREE ASSAY (FT-3): CPT | Performed by: INTERNAL MEDICINE

## 2023-05-15 PROCEDURE — 80050 GENERAL HEALTH PANEL: CPT | Performed by: INTERNAL MEDICINE

## 2023-05-15 PROCEDURE — 84445 ASSAY OF TSI GLOBULIN: CPT | Performed by: INTERNAL MEDICINE

## 2023-05-15 PROCEDURE — 86376 MICROSOMAL ANTIBODY EACH: CPT | Performed by: INTERNAL MEDICINE

## 2023-05-15 PROCEDURE — 84439 ASSAY OF FREE THYROXINE: CPT | Performed by: INTERNAL MEDICINE

## 2023-05-16 LAB — THYROPEROXIDASE AB SERPL-ACNC: 367 IU/ML (ref 0–34)

## 2023-05-17 LAB — TSI SER-ACNC: 1.43 IU/L (ref 0–0.55)

## 2023-06-02 PROBLEM — Z86.69 HX OF MIGRAINES: Status: ACTIVE | Noted: 2023-06-02

## 2023-06-02 PROBLEM — E66.9 CLASS 2 OBESITY WITH BODY MASS INDEX (BMI) OF 36.0 TO 36.9 IN ADULT: Status: RESOLVED | Noted: 2021-01-12 | Resolved: 2023-06-02

## 2023-06-02 PROBLEM — E66.812 CLASS 2 OBESITY WITH BODY MASS INDEX (BMI) OF 36.0 TO 36.9 IN ADULT: Status: RESOLVED | Noted: 2021-01-12 | Resolved: 2023-06-02

## 2023-06-16 ENCOUNTER — TELEPHONE (OUTPATIENT)
Dept: ENDOCRINOLOGY | Facility: CLINIC | Age: 53
End: 2023-06-16
Payer: COMMERCIAL

## 2023-06-17 ENCOUNTER — OFFICE VISIT (OUTPATIENT)
Dept: FAMILY MEDICINE CLINIC | Facility: CLINIC | Age: 53
End: 2023-06-17
Payer: COMMERCIAL

## 2023-06-17 ENCOUNTER — LAB (OUTPATIENT)
Dept: LAB | Facility: HOSPITAL | Age: 53
End: 2023-06-17
Payer: COMMERCIAL

## 2023-06-17 VITALS
HEART RATE: 72 BPM | HEIGHT: 61 IN | WEIGHT: 193 LBS | DIASTOLIC BLOOD PRESSURE: 85 MMHG | SYSTOLIC BLOOD PRESSURE: 137 MMHG | BODY MASS INDEX: 36.44 KG/M2 | RESPIRATION RATE: 20 BRPM

## 2023-06-17 DIAGNOSIS — F90.2 ATTENTION DEFICIT HYPERACTIVITY DISORDER (ADHD), COMBINED TYPE: Primary | ICD-10-CM

## 2023-06-17 DIAGNOSIS — Z79.899 MEDICATION MANAGEMENT: ICD-10-CM

## 2023-06-17 DIAGNOSIS — E05.90 HYPERTHYROIDISM: ICD-10-CM

## 2023-06-17 DIAGNOSIS — F90.9 ADULT ATTENTION DEFICIT HYPERACTIVITY DISORDER: Primary | ICD-10-CM

## 2023-06-17 LAB
ALBUMIN SERPL-MCNC: 4.4 G/DL (ref 3.5–5)
ALBUMIN/GLOB SERPL: 1.3 G/DL (ref 1.1–2.5)
ALP SERPL-CCNC: 159 U/L (ref 24–120)
ALT SERPL W P-5'-P-CCNC: 28 U/L (ref 0–35)
AMPHET+METHAMPHET UR QL: NEGATIVE
AMPHETAMINES UR QL: NEGATIVE
ANION GAP SERPL CALCULATED.3IONS-SCNC: 6 MMOL/L (ref 4–13)
AST SERPL-CCNC: 32 U/L (ref 7–45)
AUTO MIXED CELLS #: 0.5 10*3/MM3 (ref 0.1–2.6)
AUTO MIXED CELLS %: 6.6 % (ref 0.1–24)
BARBITURATES UR QL SCN: NEGATIVE
BENZODIAZ UR QL SCN: NEGATIVE
BILIRUB SERPL-MCNC: 0.3 MG/DL (ref 0.1–1)
BUN SERPL-MCNC: 10 MG/DL (ref 5–21)
BUN/CREAT SERPL: 11.4
BUPRENORPHINE SERPL-MCNC: NEGATIVE NG/ML
CALCIUM SPEC-SCNC: 9.3 MG/DL (ref 8.4–10.4)
CANNABINOIDS SERPL QL: NEGATIVE
CHLORIDE SERPL-SCNC: 105 MMOL/L (ref 98–110)
CO2 SERPL-SCNC: 31 MMOL/L (ref 24–31)
COCAINE UR QL: NEGATIVE
CREAT SERPL-MCNC: 0.88 MG/DL (ref 0.5–1.4)
EGFRCR SERPLBLD CKD-EPI 2021: 79.7 ML/MIN/1.73
ERYTHROCYTE [DISTWIDTH] IN BLOOD BY AUTOMATED COUNT: 12.7 % (ref 12.3–15.4)
FENTANYL UR-MCNC: NEGATIVE NG/ML
GLOBULIN UR ELPH-MCNC: 3.5 GM/DL
GLUCOSE SERPL-MCNC: 95 MG/DL (ref 70–100)
HCT VFR BLD AUTO: 40 % (ref 34–46.6)
HGB BLD-MCNC: 13.1 G/DL (ref 12–15.9)
LYMPHOCYTES # BLD AUTO: 1.9 10*3/MM3 (ref 0.7–3.1)
LYMPHOCYTES NFR BLD AUTO: 27.3 % (ref 19.6–45.3)
MCH RBC QN AUTO: 30.3 PG (ref 26.6–33)
MCHC RBC AUTO-ENTMCNC: 32.8 G/DL (ref 31.5–35.7)
MCV RBC AUTO: 92.4 FL (ref 79–97)
METHADONE UR QL SCN: NEGATIVE
NEUTROPHILS NFR BLD AUTO: 4.6 10*3/MM3 (ref 1.7–7)
NEUTROPHILS NFR BLD AUTO: 66.1 % (ref 42.7–76)
OPIATES UR QL: NEGATIVE
OXYCODONE UR QL SCN: NEGATIVE
PCP UR QL SCN: NEGATIVE
PLATELET # BLD AUTO: 192 10*3/MM3 (ref 140–450)
PMV BLD AUTO: 10.3 FL (ref 6–12)
POTASSIUM SERPL-SCNC: 4 MMOL/L (ref 3.5–5.3)
PROPOXYPH UR QL: NEGATIVE
PROT SERPL-MCNC: 7.9 G/DL (ref 6.3–8.7)
RBC # BLD AUTO: 4.33 10*6/MM3 (ref 3.77–5.28)
SODIUM SERPL-SCNC: 142 MMOL/L (ref 135–145)
T3FREE SERPL-MCNC: 2.58 PG/ML (ref 2–4.4)
T4 FREE SERPL-MCNC: 1.22 NG/DL (ref 0.93–1.7)
TRICYCLICS UR QL SCN: NEGATIVE
TSH SERPL DL<=0.05 MIU/L-ACNC: 1.73 UIU/ML (ref 0.27–4.2)
WBC NRBC COR # BLD: 7 10*3/MM3 (ref 3.4–10.8)

## 2023-06-17 PROCEDURE — 84439 ASSAY OF FREE THYROXINE: CPT

## 2023-06-17 PROCEDURE — 86376 MICROSOMAL ANTIBODY EACH: CPT

## 2023-06-17 PROCEDURE — 36415 COLL VENOUS BLD VENIPUNCTURE: CPT

## 2023-06-17 PROCEDURE — 1159F MED LIST DOCD IN RCRD: CPT | Performed by: NURSE PRACTITIONER

## 2023-06-17 PROCEDURE — 84481 FREE ASSAY (FT-3): CPT

## 2023-06-17 PROCEDURE — 99213 OFFICE O/P EST LOW 20 MIN: CPT | Performed by: NURSE PRACTITIONER

## 2023-06-17 PROCEDURE — 1160F RVW MEDS BY RX/DR IN RCRD: CPT | Performed by: NURSE PRACTITIONER

## 2023-06-17 PROCEDURE — 80050 GENERAL HEALTH PANEL: CPT

## 2023-06-17 PROCEDURE — 86800 THYROGLOBULIN ANTIBODY: CPT

## 2023-06-17 PROCEDURE — 80307 DRUG TEST PRSMV CHEM ANLYZR: CPT

## 2023-06-17 NOTE — PROGRESS NOTES
"Chief Complaint  ADHD    Subjective    History of Present Illness      Patient presents to [facility] for   History of Present Illness  ADHD/Mood HPI    Visit for:  follow-up. Most recent visit was 4 months ago.  Interim changes to follow up on today: no change in medication  Work/School Performance:  going well  Cognitive:  able to focus    Behavior  Hyperactivity: is not hyperactive  Impulsivity: no impulsivity and no unsafe behavior  Tasking: able to initiate tasks, able to complete tasks, and able to mult-task    Social  ADHD social/impulsive symptoms:  not impatient, does not blurt out inappropriate comments, and no excessive talking    Behavioral health  Behavior: no concerns  Emotional coping: demonstrates feelings of no concerns         Review of Systems    I have reviewed and agree with the HPI and ROS information as above.  Kaleigh Garza, APRN     Objective   Vital Signs:   /85   Pulse 72   Resp 20   Ht 154.9 cm (61\")   Wt 87.5 kg (193 lb)   BMI 36.47 kg/m²            Physical Exam  Constitutional:       Appearance: Normal appearance. She is well-developed.   HENT:      Head: Normocephalic and atraumatic.      Right Ear: External ear normal.      Left Ear: External ear normal.      Nose: Nose normal. No nasal tenderness or congestion.      Mouth/Throat:      Lips: Pink. No lesions.      Mouth: Mucous membranes are moist. No oral lesions.      Dentition: Normal dentition.      Pharynx: Oropharynx is clear. No pharyngeal swelling, oropharyngeal exudate or posterior oropharyngeal erythema.   Eyes:      General: Lids are normal. Vision grossly intact. No scleral icterus.        Right eye: No discharge.         Left eye: No discharge.      Extraocular Movements: Extraocular movements intact.      Conjunctiva/sclera: Conjunctivae normal.      Right eye: Right conjunctiva is not injected.      Left eye: Left conjunctiva is not injected.      Pupils: Pupils are equal, " [facility] Note  Quick DC. Inappropriate Request   Refill request requires further review by MD: NO   Medication Therapy Plan: Pharmacy is requesting new script(s) for the following medications without required information, (sig/ frequency/qty/etc)     [facility] action(s):  Quick Discontinue      Duplicate Pended Encounter(s)/ Last Prescribed Details:    Pharmacies have been requesting medications for patients without required information, (sig, frequency, qty, etc.). In addition, requests are sent for medication(s) pt. are currently not taking, and medications patients have never taken.    We have spoken to the pharmacies about these request types and advised their teams previously that we are unable to assess these New Script requests and require all details for these requests. This is a known issue and has been reported.        Medication related problems are not assessed for QDC.   Medication Reconciliation Completed? NO Were there pending details that required adjustment? NO     Automatic Epic Generated Protocol Data Below:   Requested Prescriptions   Pending Prescriptions Disp Refills    pantoprazole (PROTONIX) 40 MG tablet [Pharmacy Med Name: PANTOPRAZOLE DR 40MG TAB] 90 tablet 0              Appointments      Date Provider   Last Visit   11/11/2021 Salazar Bird MD   Next Visit   Visit date not found Salazar Bird MD        Note composed:10:11 AM 01/12/2022           round, and reactive to light.   Cardiovascular:      Rate and Rhythm: Normal rate and regular rhythm.      Heart sounds: Normal heart sounds. No murmur heard.    No gallop.   Pulmonary:      Effort: Pulmonary effort is normal.      Breath sounds: Normal breath sounds and air entry. No wheezing, rhonchi or rales.   Musculoskeletal:         General: No tenderness or deformity. Normal range of motion.      Cervical back: Full passive range of motion without pain, normal range of motion and neck supple.      Right lower leg: No edema.      Left lower leg: No edema.   Skin:     General: Skin is warm and dry.      Coloration: Skin is not jaundiced.      Findings: No rash.   Neurological:      Mental Status: She is alert and oriented to person, place, and time.      Sensory: Sensation is intact.      Motor: Motor function is intact.      Coordination: Coordination is intact.      Gait: Gait is intact.   Psychiatric:         Attention and Perception: Attention normal.         Mood and Affect: Mood and affect normal.         Behavior: Behavior is not hyperactive. Behavior is cooperative.         Thought Content: Thought content normal.         Judgment: Judgment normal.        JING-7: Over the last two weeks, how often have you been bothered by the following problems?  Feeling nervous, anxious or on edge: Several days  Not being able to stop or control worrying: Not at all  Worrying too much about different things: Not at all  Trouble Relaxing: Not at all  Being so restless that it is hard to sit still: Not at all  Becoming easily annoyed or irritable: Not at all  Feeling afraid as if something awful might happen: Not at all  JING 7 Total Score: 1  If you checked any problems, how difficult have these problems made it for you to do your work, take care of things at home, or get along with other people: Not difficult at all    PHQ-2 Depression Screening  Little interest or pleasure in doing things? 0-->not at all   Feeling  down, depressed, or hopeless? 0-->not at all   PHQ-2 Total Score 0     PHQ-9 Depression Screening  Little interest or pleasure in doing things? 0-->not at all   Feeling down, depressed, or hopeless? 0-->not at all   Trouble falling or staying asleep, or sleeping too much?     Feeling tired or having little energy?     Poor appetite or overeating?     Feeling bad about yourself - or that you are a failure or have let yourself or your family down?     Trouble concentrating on things, such as reading the newspaper or watching television?     Moving or speaking so slowly that other people could have noticed? Or the opposite - being so fidgety or restless that you have been moving around a lot more than usual?     Thoughts that you would be better off dead, or of hurting yourself in some way?     PHQ-9 Total Score 0   If you checked off any problems, how difficult have these problems made it for you to do your work, take care of things at home, or get along with other people?        Result Review  Data Reviewed:                   Assessment and Plan      Diagnoses and all orders for this visit:    1. Adult attention deficit hyperactivity disorder (Primary)    2. Medication management  -     Urine Drug Screen - Urine, Clean Catch; Future    Pt is here for follow-up of ADHD. She is needing refills on her Vyvanse 40mg today. States symptoms are controlled on current medication. UDS ordered. HARLEY pending.  Follow-up in 3 months.         Follow Up   No follow-ups on file.  Patient was given instructions and counseling regarding her condition or for health maintenance advice. Please see specific information pulled into the AVS if appropriate.

## 2023-06-20 LAB
THYROGLOB AB SERPL-ACNC: 12.7 IU/ML (ref 0–0.9)
THYROPEROXIDASE AB SERPL-ACNC: 363 IU/ML (ref 0–34)

## 2023-08-04 DIAGNOSIS — E05.00 GRAVES' DISEASE: Primary | ICD-10-CM

## 2023-08-04 RX ORDER — METHIMAZOLE 5 MG/1
5 TABLET ORAL DAILY
Qty: 90 TABLET | Refills: 3 | Status: SHIPPED | OUTPATIENT
Start: 2023-08-04 | End: 2024-08-03

## 2023-08-30 ENCOUNTER — TELEPHONE (OUTPATIENT)
Dept: FAMILY MEDICINE CLINIC | Facility: CLINIC | Age: 53
End: 2023-08-30
Payer: COMMERCIAL

## 2023-08-30 DIAGNOSIS — F90.2 ATTENTION DEFICIT HYPERACTIVITY DISORDER (ADHD), COMBINED TYPE: ICD-10-CM

## 2023-08-30 NOTE — TELEPHONE ENCOUNTER
Caller: Prem Nicolas ADITYA    Relationship: Self    Best call back number: [phone]     Requested Prescriptions:   Requested Prescriptions     Pending Prescriptions Disp Refills    lisdexamfetamine (Vyvanse) 50 MG capsule 30 capsule 0     Sig: Take 1 capsule by mouth Every Morning for 30 days        Pharmacy where request should be sent: [id number] [place] - [address] AT ACROSS FROM [facility] [phone] [facility] [phone]      Last office visit with prescribing clinician: Visit date not found   Last telemedicine visit with prescribing clinician: Visit date not found   Next office visit with prescribing clinician: 9/14/2023     Additional details provided by patient: COMPLETELY OUT    Does the patient have less than a 3 day supply:  [x] Yes  [] No    Would you like a call back once the refill request has been completed: [] Yes [] No    If the office needs to give you a call back, can they leave a voicemail: [] Yes [] No    Valerie Newberry Rep   08/30/23 15:39 CDT

## 2023-09-14 ENCOUNTER — OFFICE VISIT (OUTPATIENT)
Dept: FAMILY MEDICINE CLINIC | Facility: CLINIC | Age: 53
End: 2023-09-14
Payer: COMMERCIAL

## 2023-09-14 VITALS
TEMPERATURE: 97.8 F | BODY MASS INDEX: 34.62 KG/M2 | WEIGHT: 183.38 LBS | HEIGHT: 61 IN | DIASTOLIC BLOOD PRESSURE: 72 MMHG | SYSTOLIC BLOOD PRESSURE: 121 MMHG | HEART RATE: 69 BPM | OXYGEN SATURATION: 100 %

## 2023-09-14 DIAGNOSIS — F90.9 ADULT ATTENTION DEFICIT HYPERACTIVITY DISORDER: ICD-10-CM

## 2023-09-14 DIAGNOSIS — F90.9 ADULT ATTENTION DEFICIT HYPERACTIVITY DISORDER: Primary | ICD-10-CM

## 2023-09-14 DIAGNOSIS — L40.9 PSORIASIS: Primary | ICD-10-CM

## 2023-09-14 PROCEDURE — 99214 OFFICE O/P EST MOD 30 MIN: CPT

## 2023-09-14 PROCEDURE — 1159F MED LIST DOCD IN RCRD: CPT

## 2023-09-14 PROCEDURE — 1160F RVW MEDS BY RX/DR IN RCRD: CPT

## 2023-09-14 RX ORDER — CLOBETASOL PROPIONATE 0.5 MG/G
1 CREAM TOPICAL 2 TIMES DAILY
Qty: 45 G | Refills: 0 | Status: SHIPPED | OUTPATIENT
Start: 2023-09-14

## 2023-09-14 NOTE — PROGRESS NOTES
"Chief Complaint  ADHD and Psoriasis (ON PALMS OF HANDS)    Subjective        Nicolas Amaro presents to [facility]  History of Present Illness  Patient here for follow up of ADHD and wants to discuss medications for psoriasis.   Psoriasis      Objective   Vital Signs:  /72   Pulse 69   Temp 97.8 °F (36.6 °C)   Ht 154.9 cm (61\")   Wt 83.2 kg (183 lb 6 oz)   SpO2 100%   BMI 34.65 kg/m²   Estimated body mass index is 34.65 kg/m² as calculated from the following:    Height as of this encounter: 154.9 cm (61\").    Weight as of this encounter: 83.2 kg (183 lb 6 oz).           Physical Exam  Vitals and nursing note reviewed.   Constitutional:       General: She is not in acute distress.     Appearance: Normal appearance. She is not toxic-appearing or diaphoretic.   HENT:      Head: Normocephalic and atraumatic.      Right Ear: External ear normal.      Left Ear: External ear normal.      Nose: Nose normal.   Cardiovascular:      Rate and Rhythm: Normal rate and regular rhythm.      Pulses: Normal pulses.      Heart sounds: Normal heart sounds.   Pulmonary:      Effort: Pulmonary effort is normal.      Breath sounds: Normal breath sounds.   Musculoskeletal:      Cervical back: Normal range of motion.   Skin:     General: Skin is warm and dry.      Capillary Refill: Capillary refill takes less than 2 seconds.      Comments: Dry plaque-like lesions noted to bilateral palms of hands and extensor surfaces of elbows   Neurological:      Mental Status: She is alert and oriented to person, place, and time. Mental status is at baseline.      GCS: GCS eye subscore is 4. GCS verbal subscore is 5. GCS motor subscore is 6.   Psychiatric:         Mood and Affect: Mood normal.         Behavior: Behavior normal.         Thought Content: Thought content normal.         Judgment: Judgment normal.      Result Review :          Office Visit with Kaleigh Garza APRN (06/17/2023)   Urine Drug " Screen - Urine, Clean Catch (06/17/2023 11:48)            Assessment and Plan   Diagnoses and all orders for this visit:    1. Psoriasis (Primary)  -     clobetasol (TEMOVATE) 0.05 % cream; Apply 1 application  topically to the appropriate area as directed 2 (Two) Times a Day.  Dispense: 45 g; Refill: 0    2. Adult attention deficit hyperactivity disorder      Patient is seen today following up on ADHD.  Has been taking Vyvanse 50 mg daily.  States she is doing very well on this current regimen and would like to continue same as her symptoms are well controlled.  UDS is up-to-date and appropriate.  Freedom pending, patient denies HI/SI, will pend medications to Dr. Rincon.  We will see the patient back in 3 months.  Patient is also wanting to discuss a medication that she can use topically for her psoriasis.  She states she is currently experiencing a flareup.  She has lesions noted to the palms of bilateral hands as well as bilateral extensor surfaces of elbows.  We will trial clobetasol cream.    Plan:  1.  Continue Vyvanse 50 mg daily  2.  Start clobetasol cream  3.  Follow-up in 3 months         Follow Up   Return in about 3 months (around 12/14/2023) for adhd.  Patient was given instructions and counseling regarding her condition or for health maintenance advice. Please see specific information pulled into the AVS if appropriate.

## 2023-10-13 ENCOUNTER — NURSE TRIAGE (OUTPATIENT)
Dept: CALL CENTER | Facility: HOSPITAL | Age: 53
End: 2023-10-13
Payer: COMMERCIAL

## 2023-10-13 DIAGNOSIS — F90.9 ADULT ATTENTION DEFICIT HYPERACTIVITY DISORDER: Primary | ICD-10-CM

## 2023-10-13 NOTE — TELEPHONE ENCOUNTER
"Patient requesting refill of Vyvanse. Spoke with Dr. Rincon's office. Dr. Rincon not in the office today. Refill can be managed when office opens on Monday. Patient notified.    Reason for Disposition   Caller requesting a CONTROLLED substance prescription refill (e.g., narcotics, ADHD medicines)    Additional Information   Negative: New-onset or worsening symptoms, see that guideline (e.g., diarrhea, runny nose, sore throat)   Negative: Medicine question not related to refill or renewal   Negative: Caller (e.g., patient or pharmacist) requesting information about a new medicine   Negative: Caller requesting information unrelated to medicine   Negative: [1] Prescription refill request for ESSENTIAL medicine (i.e., likelihood of harm to patient if not taken) AND [2] triager unable to refill per department policy   Negative: [1] Prescription not at pharmacy AND [2] was prescribed by PCP recently  (Exception: Triager has access to EMR and prescription is recorded there. Go to Home Care and confirm for pharmacy.)   Negative: [1] Pharmacy calling with prescription questions AND [2] triager unable to answer question   Negative: Prescription request for new medicine (not a refill)    Answer Assessment - Initial Assessment Questions  1. DRUG NAME: \"What medicine do you need to have refilled?\"      Vyvanse  2. REFILLS REMAINING: \"How many refills are remaining?\" (Note: The label on the medicine or pill bottle will show how many refills are remaining. If there are no refills remaining, then a renewal may be needed.)      0  3. EXPIRATION DATE: \"What is the expiration date?\" (Note: The label states when the prescription will , and thus can no longer be refilled.)      2023  4. PRESCRIBING HCP: \"Who prescribed it?\" Reason: If prescribed by specialist, call should be referred to that group.      Dr. Rincon  5. SYMPTOMS: \"Do you have any symptoms?\"      NA  6. PREGNANCY: \"Is there any chance that you are pregnant?\" \"When " "was your last menstrual period?\"      NA    Protocols used: Medication Refill and Renewal Call-ADULT-    "

## 2023-10-16 RX ORDER — LISDEXAMFETAMINE DIMESYLATE CAPSULES 50 MG/1
50 CAPSULE ORAL EVERY MORNING
Qty: 30 CAPSULE | Refills: 0 | OUTPATIENT
Start: 2023-10-16 | End: 2023-11-15

## 2023-11-27 ENCOUNTER — TELEPHONE (OUTPATIENT)
Dept: FAMILY MEDICINE CLINIC | Facility: CLINIC | Age: 53
End: 2023-11-27
Payer: COMMERCIAL

## 2023-11-27 DIAGNOSIS — L40.9 PSORIASIS: ICD-10-CM

## 2023-11-27 RX ORDER — CLOBETASOL PROPIONATE 0.5 MG/G
1 CREAM TOPICAL 2 TIMES DAILY
Qty: 45 G | Refills: 0 | Status: SHIPPED | OUTPATIENT
Start: 2023-11-27

## 2023-11-27 NOTE — TELEPHONE ENCOUNTER
"  Caller: Nicolas Amaro    Relationship: Self    Best call back number:     [phone] (Home)       Requested Prescriptions:   Requested Prescriptions      No prescriptions requested or ordered in this encounter    THE PATIENT STATES THAT SHE NEEDS A MEDICATION THAT STARTS WITH \" CL\" FOR HER PSORIASIS; THE PATIENT STATES THAT SHE DOES NOT KNOW THE NAME OF THE MEDICATION.  PLEASE ADVISE    Pharmacy where request should be sent: [id number] [place] - [address] AT [facility] [address] - [phone]  - [phone] FX     Last office visit with prescribing clinician: Visit date not found   Last telemedicine visit with prescribing clinician: Visit date not found   Next office visit with prescribing clinician: Visit date not found     Additional details provided by patient: THE PATIENT STATES THAT SHE IS HAVING A FLARE UP OF PSORIASIS ON HER PALMS AND SOLES OF HER FEET.     Does the patient have less than a 3 day supply:  [x] Yes  [] No    Valerie Roberts Rep   11/27/23 15:08 CST       "

## 2023-12-12 ENCOUNTER — LAB (OUTPATIENT)
Dept: LAB | Facility: HOSPITAL | Age: 53
End: 2023-12-12
Payer: COMMERCIAL

## 2023-12-12 ENCOUNTER — OFFICE VISIT (OUTPATIENT)
Dept: FAMILY MEDICINE CLINIC | Facility: CLINIC | Age: 53
End: 2023-12-12
Payer: COMMERCIAL

## 2023-12-12 VITALS
DIASTOLIC BLOOD PRESSURE: 78 MMHG | HEART RATE: 50 BPM | HEIGHT: 61 IN | BODY MASS INDEX: 35.08 KG/M2 | OXYGEN SATURATION: 97 % | WEIGHT: 185.8 LBS | SYSTOLIC BLOOD PRESSURE: 123 MMHG

## 2023-12-12 DIAGNOSIS — Z12.11 COLON CANCER SCREENING: ICD-10-CM

## 2023-12-12 DIAGNOSIS — Z28.21 INFLUENZA VACCINATION DECLINED: ICD-10-CM

## 2023-12-12 DIAGNOSIS — F90.9 ADULT ATTENTION DEFICIT HYPERACTIVITY DISORDER: Primary | ICD-10-CM

## 2023-12-12 DIAGNOSIS — F90.2 ATTENTION DEFICIT HYPERACTIVITY DISORDER (ADHD), COMBINED TYPE: ICD-10-CM

## 2023-12-12 DIAGNOSIS — Z12.31 SCREENING MAMMOGRAM FOR BREAST CANCER: ICD-10-CM

## 2023-12-12 DIAGNOSIS — E55.9 VITAMIN D DEFICIENCY: ICD-10-CM

## 2023-12-12 PROCEDURE — 84080 ASSAY ALKALINE PHOSPHATASES: CPT | Performed by: INTERNAL MEDICINE

## 2023-12-12 PROCEDURE — 84481 FREE ASSAY (FT-3): CPT | Performed by: INTERNAL MEDICINE

## 2023-12-12 PROCEDURE — 84075 ASSAY ALKALINE PHOSPHATASE: CPT | Performed by: INTERNAL MEDICINE

## 2023-12-12 PROCEDURE — 84439 ASSAY OF FREE THYROXINE: CPT | Performed by: INTERNAL MEDICINE

## 2023-12-12 PROCEDURE — 80050 GENERAL HEALTH PANEL: CPT | Performed by: INTERNAL MEDICINE

## 2023-12-12 PROCEDURE — 82306 VITAMIN D 25 HYDROXY: CPT | Performed by: INTERNAL MEDICINE

## 2023-12-12 RX ORDER — CHOLECALCIFEROL (VITAMIN D3) 1250 MCG
50000 CAPSULE ORAL
Qty: 6 CAPSULE | Refills: 0 | Status: SHIPPED | OUTPATIENT
Start: 2023-12-12

## 2023-12-12 RX ORDER — LISDEXAMFETAMINE DIMESYLATE CAPSULES 50 MG/1
50 CAPSULE ORAL EVERY MORNING
Qty: 30 CAPSULE | Refills: 0 | Status: SHIPPED | OUTPATIENT
Start: 2024-01-09 | End: 2024-02-08

## 2023-12-12 RX ORDER — LISDEXAMFETAMINE DIMESYLATE CAPSULES 50 MG/1
50 CAPSULE ORAL EVERY MORNING
Qty: 30 CAPSULE | Refills: 0 | Status: SHIPPED | OUTPATIENT
Start: 2023-12-12 | End: 2024-01-11

## 2023-12-12 RX ORDER — LISDEXAMFETAMINE DIMESYLATE CAPSULES 50 MG/1
50 CAPSULE ORAL EVERY MORNING
Qty: 30 CAPSULE | Refills: 0 | Status: CANCELLED | OUTPATIENT
Start: 2023-12-12 | End: 2024-01-11

## 2023-12-12 NOTE — PROGRESS NOTES
"Chief Complaint  ADHD    Subjective    History of Present Illness      Patient presents to [facility] for   History of Present Illness  ADHD/Mood HPI    Visit for:  follow-up. Most recent visit was 3 months ago.  Interim changes to follow up on today: no change in medication  Work/School Performance:  going well and improving  Cognitive:  able to focus    Behavior  Hyperactivity: is not hyperactive  Impulsivity: no impulsivity and no unsafe behavior  Tasking: able to initiate tasks and able to complete tasks    Social  ADHD social/impulsive symptoms:  not impatient and no excessive talking    Behavioral health  Behavior: no concerns  Emotional coping: demonstrates feelings of no concerns    Pt presents today for 3 month follow up on ADHD medication. Pt tolerating well, no problems or concerns at this time            Review of Systems    I have reviewed and agree with the HPI and ROS information as above.  SPENSER Pisano     Objective   Vital Signs:   /78   Pulse 50   Ht 154.9 cm (60.98\")   Wt 84.3 kg (185 lb 12.8 oz)   SpO2 97%   BMI 35.13 kg/m²            Physical Exam  Constitutional:       Appearance: Normal appearance. She is well-developed.   HENT:      Head: Normocephalic and atraumatic.      Right Ear: External ear normal.      Left Ear: External ear normal.      Nose: Nose normal. No congestion.      Mouth/Throat:      Lips: Pink.   Eyes:      General: Lids are normal. Vision grossly intact. No scleral icterus.        Right eye: No discharge.         Left eye: No discharge.      Extraocular Movements: Extraocular movements intact.      Conjunctiva/sclera: Conjunctivae normal.      Right eye: Right conjunctiva is not injected.      Left eye: Left conjunctiva is not injected.      Pupils: Pupils are equal, round, and reactive to light.   Neck:      Thyroid: No thyroid mass.      Trachea: Trachea normal.   Cardiovascular:      Rate and Rhythm: Normal rate and " regular rhythm.      Heart sounds: Normal heart sounds. No murmur heard.     No gallop.   Pulmonary:      Effort: Pulmonary effort is normal.      Breath sounds: Normal breath sounds and air entry. No wheezing, rhonchi or rales.   Abdominal:      General: There is no distension.      Palpations: Abdomen is soft. There is no mass.      Tenderness: There is no abdominal tenderness. There is no right CVA tenderness, left CVA tenderness, guarding or rebound.   Musculoskeletal:         General: No tenderness or deformity. Normal range of motion.      Cervical back: Full passive range of motion without pain, normal range of motion and neck supple.      Thoracic back: Normal.      Right lower leg: No edema.      Left lower leg: No edema.   Skin:     General: Skin is warm and dry.      Coloration: Skin is not jaundiced.      Findings: No rash.   Neurological:      Mental Status: She is alert and oriented to person, place, and time.      Sensory: Sensation is intact.      Motor: Motor function is intact.      Coordination: Coordination is intact.      Gait: Gait is intact.      Deep Tendon Reflexes: Reflexes are normal and symmetric.   Psychiatric:         Mood and Affect: Mood and affect normal.         Judgment: Judgment normal.               Result Review  Data Reviewed:              Urine Drug Screen - Urine, Clean Catch (06/17/2023 11:48)  Fentanyl, Urine - Urine, Clean Catch (06/17/2023 11:48)     Assessment and Plan      Diagnoses and all orders for this visit:    1. Adult attention deficit hyperactivity disorder (Primary)  Comments:  Stable on Vyvanse, uds is utd.    2. Vitamin D deficiency  Comments:  Refill for 6 weeks then she can start otc.  Orders:  -     Cholecalciferol (Vitamin D3) 1.25 MG (33720 UT) capsule; Take 1 capsule by mouth Every 30 (Thirty) Days.  Dispense: 6 capsule; Refill: 0    3. Screening mammogram for breast cancer  Comments:  Ordered. This will be her initial.  Orders:  -     Mammo Screening  Digital Tomosynthesis Bilateral With CAD; Future    4. Colon cancer screening  Comments:  She is agreeable to cologuard only at this time.  Orders:  -     Cologuard - Stool, Per Rectum; Future    5. Influenza vaccination declined      ADHD Follow up:    PDMP reviewed and pending. ADRS (Adult ADHD Assessment Form) reviewed in detail, scanned in chart, and has been reviewed at time of appointment.  All questions, including medication and side effects, were discussed in detail at time of patient's visit. Patient will continue same medication which was discussed at today's visit. Patient is to return in 3 month(s).    Last Urine Toxicity  More data exists         Latest Ref Rng & Units 6/17/2023 3/19/2022   LAST URINE TOXICITY RESULTS   Amphetamine, Urine Qual Negative Negative  Positive    Barbiturates Screen, Urine Negative Negative  Negative    Benzodiazepine Screen, Urine Negative Negative  Negative    Buprenorphine, Screen, Urine Negative Negative  Negative    Cocaine Screen, Urine Negative Negative  Negative    Fentanyl, Urine Negative Negative  -   Methadone Screen , Urine Negative Negative  Negative    Methamphetamine, Ur Negative Negative  Negative         Urine Drug Screen Results: UDS RESULTS: Current results appropriate       Follow Up   Return in about 3 months (around 3/12/2024).  Patient was given instructions and counseling regarding her condition or for health maintenance advice. Please see specific information pulled into the AVS if appropriate.

## 2023-12-12 NOTE — TELEPHONE ENCOUNTER
Caller: Nicolas Amaro    Relationship: Self    Best call back number: [phone]     Requested Prescriptions:   Requested Prescriptions     Pending Prescriptions Disp Refills    lisdexamfetamine (Vyvanse) 50 MG capsule 30 capsule 0     Sig: Take 1 capsule by mouth Every Morning for 30 days        Pharmacy where request should be sent: [facility] #[id number] [place], [place] - [address] AT [facility] [address] - [phone]  - [phone] FX     Last office visit with prescribing clinician: Visit date not found   Last telemedicine visit with prescribing clinician: Visit date not found   Next office visit with prescribing clinician: Visit date not found     Additional details provided by patient: PATIENT STATED THAT SHE HAS ONE LEFT. CALLER ASKED FOR SOMEONE TO CALL IF SHE DOES NOT NEED THE APPOINTMENT FOR REFILLS TODAY.    Does the patient have less than a 3 day supply:  [x] Yes  [] No    Would you like a call back once the refill request has been completed: [] Yes [x] No    If the office needs to give you a call back, can they leave a voicemail: [] Yes [x] No    Valerie Castano Rep   12/12/23 09:26 CST

## 2024-01-16 ENCOUNTER — OFFICE VISIT (OUTPATIENT)
Dept: FAMILY MEDICINE CLINIC | Facility: CLINIC | Age: 54
End: 2024-01-16
Payer: COMMERCIAL

## 2024-01-16 ENCOUNTER — LAB (OUTPATIENT)
Dept: LAB | Facility: HOSPITAL | Age: 54
End: 2024-01-16
Payer: COMMERCIAL

## 2024-01-16 VITALS
TEMPERATURE: 97.8 F | RESPIRATION RATE: 20 BRPM | WEIGHT: 185 LBS | HEART RATE: 89 BPM | SYSTOLIC BLOOD PRESSURE: 116 MMHG | DIASTOLIC BLOOD PRESSURE: 72 MMHG | HEIGHT: 61 IN | OXYGEN SATURATION: 94 % | BODY MASS INDEX: 34.93 KG/M2

## 2024-01-16 DIAGNOSIS — U07.1 COVID-19 VIRUS INFECTION: Primary | ICD-10-CM

## 2024-01-16 DIAGNOSIS — R52 BODY ACHES: ICD-10-CM

## 2024-01-16 LAB
FLUAV AG NPH QL: NEGATIVE
FLUBV AG NPH QL IA: NEGATIVE
SARS-COV-2 RDRP RESP QL NAA+PROBE: DETECTED

## 2024-01-16 PROCEDURE — 87635 SARS-COV-2 COVID-19 AMP PRB: CPT

## 2024-01-16 PROCEDURE — 99213 OFFICE O/P EST LOW 20 MIN: CPT | Performed by: STUDENT IN AN ORGANIZED HEALTH CARE EDUCATION/TRAINING PROGRAM

## 2024-01-16 PROCEDURE — 87804 INFLUENZA ASSAY W/OPTIC: CPT

## 2024-01-16 RX ORDER — BROMPHENIRAMINE MALEATE, PSEUDOEPHEDRINE HYDROCHLORIDE, AND DEXTROMETHORPHAN HYDROBROMIDE 2; 30; 10 MG/5ML; MG/5ML; MG/5ML
5 SYRUP ORAL 4 TIMES DAILY PRN
Qty: 118 ML | Refills: 0 | Status: SHIPPED | OUTPATIENT
Start: 2024-01-16

## 2024-01-16 NOTE — PROGRESS NOTES
"       Chief Complaint  Nasal Congestion, Cough, Sore Throat, Headache (Started Sunday /), and Fatigue    Subjective        Nicolas Amaro presents to [facility]    HPI    Sick visit  -Symptoms started 2 days ago w/ subjective f/c, body aches, HA, fatigue. Has progressed to nasal congestion, cough, sore throat. Her face feels congested. No sob. Took mucinex which didn't help.     Past Medical History:   Diagnosis Date    Anxiety     Autoimmune thyroiditis     Fatigue     Graves disease     Graves disease     Graves disease     Hashimoto's thyroiditis     History of cold-induced urticaria     Migraines     Migraines     Multiple exostosis, hereditary     Osteochondral defect     Psoriasis      Past Surgical History:   Procedure Laterality Date    ORTHOPEDIC SURGERY      left arm surgeries x 3 and left ring finger     TUBAL ABDOMINAL LIGATION       Social History     Socioeconomic History    Marital status: Single   Tobacco Use    Smoking status: Never     Passive exposure: Never    Smokeless tobacco: Never   Vaping Use    Vaping Use: Never used   Substance and Sexual Activity    Alcohol use: No    Drug use: No    Sexual activity: Never       Objective   Vital Signs:  /72   Pulse 89   Temp 97.8 °F (36.6 °C) (Temporal)   Resp 20   Ht 154.9 cm (60.98\")   Wt 83.9 kg (185 lb)   SpO2 94%   BMI 34.97 kg/m²   Estimated body mass index is 34.97 kg/m² as calculated from the following:    Height as of this encounter: 154.9 cm (60.98\").    Weight as of this encounter: 83.9 kg (185 lb).              Physical Exam  Vitals reviewed.   Constitutional:       Appearance: She is ill-appearing. She is not toxic-appearing.   HENT:      Head: Normocephalic.      Nose: Nose normal.      Mouth/Throat:      Mouth: Mucous membranes are moist.   Eyes:      Extraocular Movements: Extraocular movements intact.   Cardiovascular:      Rate and Rhythm: Normal rate and regular rhythm.      Heart sounds: " Normal heart sounds.   Pulmonary:      Effort: Pulmonary effort is normal.      Breath sounds: Normal breath sounds.   Musculoskeletal:         General: Normal range of motion.      Cervical back: Normal range of motion.   Skin:     General: Skin is warm and dry.   Neurological:      General: No focal deficit present.      Mental Status: She is alert and oriented to person, place, and time.   Psychiatric:         Mood and Affect: Mood normal.         Behavior: Behavior normal.        Result Review :                   Assessment and Plan   Diagnoses and all orders for this visit:    1. COVID-19 virus infection (Primary)  -Will treat w/ paxlovid given history/co-morbidities. Overall mild presentation. Discussed rest, proper hydration, NSAID use. Treat symptoms. Discussed total 5 day quarantine, rtc parameters.  -     brompheniramine-pseudoephedrine-DM 30-2-10 MG/5ML syrup; Take 5 mL by mouth 4 (Four) Times a Day As Needed for Allergies.  Dispense: 118 mL; Refill: 0  -     Nirmatrelvir & Ritonavir, 300mg/100mg, (PAXLOVID) 20 x 150 MG & 10 x 100MG tablet therapy pack tablet; Take 3 tablets by mouth 2 (Two) Times a Day.  Dispense: 1 each; Refill: 0    2. Body aches  -     COVID-19, ABBOTT IN-HOUSE,NASAL Swab (NO TRANSPORT MEDIA) 2 HR TAT - Swab, Nasopharynx; Future  -     Influenza Antigen, Rapid - Swab, Nasopharynx; Future  -     brompheniramine-pseudoephedrine-DM 30-2-10 MG/5ML syrup; Take 5 mL by mouth 4 (Four) Times a Day As Needed for Allergies.  Dispense: 118 mL; Refill: 0  -     Nirmatrelvir & Ritonavir, 300mg/100mg, (PAXLOVID) 20 x 150 MG & 10 x 100MG tablet therapy pack tablet; Take 3 tablets by mouth 2 (Two) Times a Day.  Dispense: 1 each; Refill: 0         EMR Dragon/Transcription disclaimer:   Much of this encounter note is an electronic transcription/translation of spoken language to printed text. The electronic translation of spoken language may permit erroneous, or at times, nonsensical words or phrases to  be inadvertently transcribed; although attempts have made to review the note for such errors, some may still exist. Please excuse any unrecognized transcription errors and contact us if the air is unintelligible or needs documented correction. Also, portions of this note have been copied forward, however, changed to reflect the most current clinical status of this patient.  Follow Up   No follow-ups on file.  Patient was given instructions and counseling regarding her condition or for health maintenance advice. Please see specific information pulled into the AVS if appropriate.

## 2024-01-19 ENCOUNTER — TELEPHONE (OUTPATIENT)
Dept: FAMILY MEDICINE CLINIC | Facility: CLINIC | Age: 54
End: 2024-01-19
Payer: COMMERCIAL

## 2024-01-19 NOTE — TELEPHONE ENCOUNTER
Sent pt message via [employer] about overdue lab/test of Mammo Screening Digital Tomosynthesis Bilateral With CAD

## 2024-02-01 DIAGNOSIS — L40.9 PSORIASIS: ICD-10-CM

## 2024-02-02 DIAGNOSIS — F90.2 ATTENTION DEFICIT HYPERACTIVITY DISORDER (ADHD), COMBINED TYPE: ICD-10-CM

## 2024-02-02 RX ORDER — CLOBETASOL PROPIONATE 0.5 MG/G
CREAM TOPICAL 2 TIMES DAILY
Qty: 45 G | Refills: 0 | Status: SHIPPED | OUTPATIENT
Start: 2024-02-02

## 2024-02-02 RX ORDER — LISDEXAMFETAMINE DIMESYLATE CAPSULES 50 MG/1
50 CAPSULE ORAL EVERY MORNING
Qty: 30 CAPSULE | Refills: 0 | Status: SHIPPED | OUTPATIENT
Start: 2024-02-09 | End: 2024-03-10

## 2024-02-02 NOTE — TELEPHONE ENCOUNTER
Caller: Nicolas Amaro    Relationship: Self    Best call back number:[phone]     Requested Prescriptions:   Requested Prescriptions     Pending Prescriptions Disp Refills    lisdexamfetamine (Vyvanse) 50 MG capsule 30 capsule 0     Sig: Take 1 capsule by mouth Every Morning for 30 days        Pharmacy where request should be sent: [facility] #[id number] [place], [place] - [address] AT [facility] [address] - [phone]  - [phone] FX     Last office visit with prescribing clinician: Visit date not found   Last telemedicine visit with prescribing clinician: Visit date not found   Next office visit with prescribing clinician: Visit date not found     Additional details provided by patient:     Does the patient have less than a 3 day supply:  [x] Yes  [] No    Would you like a call back once the refill request has been completed: [x] Yes [] No      Valerie Fulton Rep   02/02/24 09:08 CST

## 2024-04-05 ENCOUNTER — TELEPHONE (OUTPATIENT)
Dept: FAMILY MEDICINE CLINIC | Facility: CLINIC | Age: 54
End: 2024-04-05
Payer: COMMERCIAL

## 2024-04-17 ENCOUNTER — TRANSCRIBE ORDERS (OUTPATIENT)
Dept: ADMINISTRATIVE | Facility: HOSPITAL | Age: 54
End: 2024-04-17
Payer: COMMERCIAL

## 2024-04-17 DIAGNOSIS — L40.9 PSORIASIS: ICD-10-CM

## 2024-04-17 DIAGNOSIS — E05.00 GRAVES DISEASE: Primary | ICD-10-CM

## 2024-04-17 DIAGNOSIS — E55.9 VITAMIN D DEFICIENCY: ICD-10-CM

## 2024-04-18 RX ORDER — CLOBETASOL PROPIONATE 0.5 MG/G
CREAM TOPICAL 2 TIMES DAILY
Qty: 45 G | Refills: 0 | Status: SHIPPED | OUTPATIENT
Start: 2024-04-18

## 2024-05-01 ENCOUNTER — TELEPHONE (OUTPATIENT)
Dept: FAMILY MEDICINE CLINIC | Facility: CLINIC | Age: 54
End: 2024-05-01
Payer: COMMERCIAL

## 2024-05-02 ENCOUNTER — TELEMEDICINE (OUTPATIENT)
Dept: FAMILY MEDICINE CLINIC | Facility: CLINIC | Age: 54
End: 2024-05-02
Payer: COMMERCIAL

## 2024-05-02 VITALS — BODY MASS INDEX: 34.93 KG/M2 | HEIGHT: 61 IN | WEIGHT: 185 LBS

## 2024-05-02 DIAGNOSIS — L40.9 PSORIASIS: ICD-10-CM

## 2024-05-02 DIAGNOSIS — F90.2 ATTENTION DEFICIT HYPERACTIVITY DISORDER (ADHD), COMBINED TYPE: Primary | ICD-10-CM

## 2024-05-02 DIAGNOSIS — F41.9 ANXIETY: ICD-10-CM

## 2024-05-02 DIAGNOSIS — F90.9 ADULT ATTENTION DEFICIT HYPERACTIVITY DISORDER: Primary | ICD-10-CM

## 2024-05-02 PROCEDURE — 99214 OFFICE O/P EST MOD 30 MIN: CPT | Performed by: NURSE PRACTITIONER

## 2024-05-02 RX ORDER — CLOBETASOL PROPIONATE 0.5 MG/G
CREAM TOPICAL 2 TIMES DAILY
Qty: 60 G | Refills: 5 | Status: SHIPPED | OUTPATIENT
Start: 2024-05-02

## 2024-05-02 RX ORDER — LISDEXAMFETAMINE DIMESYLATE 50 MG/1
50 CAPSULE ORAL EVERY MORNING
Qty: 30 CAPSULE | Refills: 0 | Status: SHIPPED | OUTPATIENT
Start: 2024-05-02 | End: 2024-06-01

## 2024-05-02 RX ORDER — BUSPIRONE HYDROCHLORIDE 5 MG/1
5 TABLET ORAL 3 TIMES DAILY
Qty: 90 TABLET | Refills: 2 | Status: SHIPPED | OUTPATIENT
Start: 2024-05-02

## 2024-05-02 RX ORDER — LISDEXAMFETAMINE DIMESYLATE 50 MG/1
50 CAPSULE ORAL EVERY MORNING
Qty: 30 CAPSULE | Refills: 0 | Status: SHIPPED | OUTPATIENT
Start: 2024-05-30 | End: 2024-06-29

## 2024-05-02 NOTE — PROGRESS NOTES
"You have chosen to receive care through a telehealth visit.  Do you consent to use a video/audio connection for your medical care today? Yes   This visit was completed by Telehealth using a computer. The location of the provider is [address] [place] [address]. The location of the patient is currently at home.    Chief Complaint  ADHD    Subjective    History of Present Illness      Patient presents to [facility] for   History of Present Illness  Patient is being seen via telehealth today for an ADHD follow-up.  She states that she is doing well on her Vyvanse and denies any concerns at this time.  Patient is also suffering with significant palmar psoriasis.  She has been using a clobetasol cream.  She is having to use it regular.  She was interested in getting a dermatology referral.     Objective   Vital Signs:   Ht 154.9 cm (61\")   Wt 83.9 kg (185 lb)   BMI 34.96 kg/m²       Physical Exam  Vitals and nursing note reviewed.   Constitutional:       Appearance: Normal appearance. She is well-developed.   HENT:      Head: Normocephalic and atraumatic.      Mouth/Throat:      Lips: Pink. No lesions.   Eyes:      General: Lids are normal. Vision grossly intact.      Conjunctiva/sclera: Conjunctivae normal.      Right eye: Right conjunctiva is not injected.      Left eye: Left conjunctiva is not injected.   Pulmonary:      Effort: Pulmonary effort is normal.   Musculoskeletal:         General: Normal range of motion.      Cervical back: Full passive range of motion without pain, normal range of motion and neck supple.   Skin:     General: Skin is dry.   Neurological:      Mental Status: She is alert and oriented to person, place, and time.      Motor: Motor function is intact.   Psychiatric:         Mood and Affect: Mood and affect normal.         Judgment: Judgment normal.             Result Review  Data Reviewed:                   Assessment and Plan    Problem List Items " Addressed This Visit       Psoriasis    Relevant Medications    clobetasol propionate (TEMOVATE) 0.05 % cream    Adult attention deficit hyperactivity disorder - Primary    Relevant Medications    busPIRone (BUSPAR) 5 MG tablet    Anxiety    Relevant Medications    busPIRone (BUSPAR) 5 MG tablet     Patient is being seen via telehealth today for an ADHD follow-up.  She states that she is doing well on her Vyvanse and denies any concerns at this time.  Patient is also suffering with significant palmar psoriasis.  She has been using a clobetasol cream.  She is having to use it regular.  She was interested in getting a dermatology referral.  Plan  1.  Continue Vyvanse  2.  Continue BuSpar as needed  3.  Hold off on the dermatology referral until she has [employer] insurance at the end of the month  ADHD Follow up:    PDMP reviewed and is clean.  All questions, including medication and side effects, were discussed in detail at time of patient's visit. Patient will continue same medication which was discussed at today's visit. Patient is to return in 3 month(s).    Last Urine Toxicity  More data exists         Latest Ref Rng & Units 6/17/2023 3/19/2022   LAST URINE TOXICITY RESULTS   Amphetamine, Urine Qual Negative Negative  Positive    Barbiturates Screen, Urine Negative Negative  Negative    Benzodiazepine Screen, Urine Negative Negative  Negative    Buprenorphine, Screen, Urine Negative Negative  Negative    Cocaine Screen, Urine Negative Negative  Negative    Fentanyl, Urine Negative Negative  -   Methadone Screen , Urine Negative Negative  Negative    Methamphetamine, Ur Negative Negative  Negative         Urine Drug Screen Results: UDS RESULTS: Current results appropriate    CSA completed on: 12/12/23          Follow Up   Return in about 3 months (around 8/2/2024) for ADHD follow up.  Patient was given instructions and counseling regarding her condition or for health maintenance advice. Please see specific  information pulled into the AVS if appropriate.

## 2024-05-03 ENCOUNTER — LAB (OUTPATIENT)
Dept: LAB | Facility: HOSPITAL | Age: 54
End: 2024-05-03
Payer: COMMERCIAL

## 2024-05-03 DIAGNOSIS — E05.00 GRAVES DISEASE: ICD-10-CM

## 2024-05-03 DIAGNOSIS — E55.9 VITAMIN D DEFICIENCY: ICD-10-CM

## 2024-05-03 LAB
ALBUMIN SERPL-MCNC: 4.3 G/DL (ref 3.5–5)
ALBUMIN/GLOB SERPL: 1.1 G/DL (ref 1.1–2.5)
ALP SERPL-CCNC: 140 U/L (ref 24–120)
ALT SERPL W P-5'-P-CCNC: 52 U/L (ref 0–35)
ANION GAP SERPL CALCULATED.3IONS-SCNC: 6 MMOL/L (ref 4–13)
AST SERPL-CCNC: 51 U/L (ref 7–45)
AUTO MIXED CELLS #: 0.6 10*3/MM3 (ref 0.1–2.6)
AUTO MIXED CELLS %: 7.5 % (ref 0.1–24)
BILIRUB SERPL-MCNC: 0.3 MG/DL (ref 0.1–1)
BUN SERPL-MCNC: 10 MG/DL (ref 5–21)
BUN/CREAT SERPL: 12.8
CALCIUM SPEC-SCNC: 9.4 MG/DL (ref 8.4–10.4)
CHLORIDE SERPL-SCNC: 100 MMOL/L (ref 98–110)
CO2 SERPL-SCNC: 33 MMOL/L (ref 24–31)
CREAT SERPL-MCNC: 0.78 MG/DL (ref 0.5–1.4)
EGFRCR SERPLBLD CKD-EPI 2021: 91.5 ML/MIN/1.73
ERYTHROCYTE [DISTWIDTH] IN BLOOD BY AUTOMATED COUNT: 12.9 % (ref 12.3–15.4)
GLOBULIN UR ELPH-MCNC: 3.8 GM/DL
GLUCOSE SERPL-MCNC: 81 MG/DL (ref 70–100)
HCT VFR BLD AUTO: 39.2 % (ref 34–46.6)
HGB BLD-MCNC: 12.8 G/DL (ref 12–15.9)
LYMPHOCYTES # BLD AUTO: 2.2 10*3/MM3 (ref 0.7–3.1)
LYMPHOCYTES NFR BLD AUTO: 28.9 % (ref 19.6–45.3)
MCH RBC QN AUTO: 30.7 PG (ref 26.6–33)
MCHC RBC AUTO-ENTMCNC: 32.7 G/DL (ref 31.5–35.7)
MCV RBC AUTO: 94 FL (ref 79–97)
NEUTROPHILS NFR BLD AUTO: 4.7 10*3/MM3 (ref 1.7–7)
NEUTROPHILS NFR BLD AUTO: 63.6 % (ref 42.7–76)
PLATELET # BLD AUTO: 212 10*3/MM3 (ref 140–450)
PMV BLD AUTO: 10.3 FL (ref 6–12)
POTASSIUM SERPL-SCNC: 3.9 MMOL/L (ref 3.5–5.3)
PROT SERPL-MCNC: 8.1 G/DL (ref 6.3–8.7)
RBC # BLD AUTO: 4.17 10*6/MM3 (ref 3.77–5.28)
SODIUM SERPL-SCNC: 139 MMOL/L (ref 135–145)
WBC NRBC COR # BLD AUTO: 7.5 10*3/MM3 (ref 3.4–10.8)

## 2024-05-03 PROCEDURE — 36415 COLL VENOUS BLD VENIPUNCTURE: CPT

## 2024-05-03 PROCEDURE — 82306 VITAMIN D 25 HYDROXY: CPT

## 2024-05-03 PROCEDURE — 80050 GENERAL HEALTH PANEL: CPT

## 2024-05-03 PROCEDURE — 84439 ASSAY OF FREE THYROXINE: CPT

## 2024-05-04 LAB
25(OH)D3 SERPL-MCNC: 26.5 NG/ML (ref 30–100)
T4 FREE SERPL-MCNC: 1.34 NG/DL (ref 0.93–1.7)
TSH SERPL DL<=0.05 MIU/L-ACNC: 1.42 UIU/ML (ref 0.27–4.2)

## 2024-05-21 ENCOUNTER — TELEPHONE (OUTPATIENT)
Dept: FAMILY MEDICINE CLINIC | Facility: CLINIC | Age: 54
End: 2024-05-21
Payer: COMMERCIAL

## 2024-06-27 DIAGNOSIS — F90.2 ATTENTION DEFICIT HYPERACTIVITY DISORDER (ADHD), COMBINED TYPE: ICD-10-CM

## 2024-06-27 RX ORDER — LISDEXAMFETAMINE DIMESYLATE 50 MG/1
50 CAPSULE ORAL EVERY MORNING
Qty: 30 CAPSULE | Refills: 0 | Status: SHIPPED | OUTPATIENT
Start: 2024-06-27 | End: 2024-07-27

## 2024-06-27 NOTE — TELEPHONE ENCOUNTER
Caller: Prem Nicolas ADITYA    Relationship: Self    Best call back number: [phone]     Requested Prescriptions:   Requested Prescriptions     Pending Prescriptions Disp Refills    lisdexamfetamine (Vyvanse) 50 MG capsule 30 capsule 0     Sig: Take 1 capsule by mouth Every Morning for 30 days        Pharmacy where request should be sent: [facility] #[id number] [facility] [address] AT [facility] [address] - [phone]  - [phone] FX     Last office visit with prescribing clinician: Visit date not found   Last telemedicine visit with prescribing clinician: 5/2/2024   Next office visit with prescribing clinician: Visit date not found     Additional details provided by patient:     Does the patient have less than a 3 day supply:  [x] Yes  [] No    Would you like a call back once the refill request has been completed: [] Yes [x] No    If the office needs to give you a call back, can they leave a voicemail: [] Yes [x] No    Valerie Munoz   06/27/24 11:31 CDT

## 2024-07-16 ENCOUNTER — TELEPHONE (OUTPATIENT)
Dept: FAMILY MEDICINE CLINIC | Facility: CLINIC | Age: 54
End: 2024-07-16
Payer: COMMERCIAL

## 2024-07-17 DIAGNOSIS — Z12.31 ENCOUNTER FOR SCREENING MAMMOGRAM FOR MALIGNANT NEOPLASM OF BREAST: Primary | ICD-10-CM

## 2024-12-30 ENCOUNTER — TRANSCRIBE ORDERS (OUTPATIENT)
Dept: ADMINISTRATIVE | Facility: HOSPITAL | Age: 54
End: 2024-12-30
Payer: COMMERCIAL

## 2024-12-30 DIAGNOSIS — E55.9 VITAMIN D DEFICIENCY: ICD-10-CM

## 2024-12-30 DIAGNOSIS — E05.00 GRAVES DISEASE: Primary | ICD-10-CM

## 2025-01-02 ENCOUNTER — TELEPHONE (OUTPATIENT)
Dept: FAMILY MEDICINE CLINIC | Facility: CLINIC | Age: 55
End: 2025-01-02

## 2025-01-02 ENCOUNTER — LAB (OUTPATIENT)
Dept: LAB | Facility: HOSPITAL | Age: 55
End: 2025-01-02
Payer: COMMERCIAL

## 2025-01-02 ENCOUNTER — OFFICE VISIT (OUTPATIENT)
Dept: FAMILY MEDICINE CLINIC | Facility: CLINIC | Age: 55
End: 2025-01-02
Payer: COMMERCIAL

## 2025-01-02 VITALS
BODY MASS INDEX: 38.18 KG/M2 | SYSTOLIC BLOOD PRESSURE: 139 MMHG | HEIGHT: 61 IN | DIASTOLIC BLOOD PRESSURE: 85 MMHG | HEART RATE: 91 BPM | WEIGHT: 202.2 LBS | OXYGEN SATURATION: 99 %

## 2025-01-02 DIAGNOSIS — L40.3 PSORIASIS PALMARIS: ICD-10-CM

## 2025-01-02 DIAGNOSIS — Z11.59 ENCOUNTER FOR HEPATITIS C SCREENING TEST FOR LOW RISK PATIENT: ICD-10-CM

## 2025-01-02 DIAGNOSIS — E05.00 GRAVES DISEASE: ICD-10-CM

## 2025-01-02 DIAGNOSIS — M25.50 ARTHRALGIA, UNSPECIFIED JOINT: ICD-10-CM

## 2025-01-02 DIAGNOSIS — Z00.00 WELLNESS EXAMINATION: ICD-10-CM

## 2025-01-02 DIAGNOSIS — E55.9 VITAMIN D DEFICIENCY: ICD-10-CM

## 2025-01-02 DIAGNOSIS — F90.9 ADULT ATTENTION DEFICIT HYPERACTIVITY DISORDER: Primary | ICD-10-CM

## 2025-01-02 DIAGNOSIS — J01.90 ACUTE SINUSITIS, RECURRENCE NOT SPECIFIED, UNSPECIFIED LOCATION: ICD-10-CM

## 2025-01-02 DIAGNOSIS — F41.9 ANXIETY: ICD-10-CM

## 2025-01-02 DIAGNOSIS — F90.9 ADULT ATTENTION DEFICIT HYPERACTIVITY DISORDER: ICD-10-CM

## 2025-01-02 DIAGNOSIS — Z00.00 WELLNESS EXAMINATION: Primary | ICD-10-CM

## 2025-01-02 DIAGNOSIS — Z12.31 ENCOUNTER FOR SCREENING MAMMOGRAM FOR MALIGNANT NEOPLASM OF BREAST: ICD-10-CM

## 2025-01-02 DIAGNOSIS — Z12.11 COLON CANCER SCREENING: ICD-10-CM

## 2025-01-02 LAB
ALBUMIN SERPL-MCNC: 4.4 G/DL (ref 3.5–5)
ALBUMIN/GLOB SERPL: 1.3 G/DL (ref 1.1–2.5)
ALP SERPL-CCNC: 133 U/L (ref 24–120)
ALT SERPL W P-5'-P-CCNC: 28 U/L (ref 0–35)
AMPHET+METHAMPHET UR QL: NEGATIVE
AMPHETAMINES UR QL: NEGATIVE
ANION GAP SERPL CALCULATED.3IONS-SCNC: 10 MMOL/L (ref 4–13)
AST SERPL-CCNC: 30 U/L (ref 7–45)
AUTO MIXED CELLS #: 0.3 10*3/MM3 (ref 0.1–2.6)
AUTO MIXED CELLS %: 3.5 % (ref 0.1–24)
BACTERIA UR QL AUTO: ABNORMAL /HPF
BARBITURATES UR QL SCN: NEGATIVE
BENZODIAZ UR QL SCN: NEGATIVE
BILIRUB SERPL-MCNC: 0.2 MG/DL (ref 0.1–1)
BILIRUB UR QL STRIP: NEGATIVE
BUN SERPL-MCNC: 13 MG/DL (ref 5–21)
BUN/CREAT SERPL: 16.3
BUPRENORPHINE SERPL-MCNC: NEGATIVE NG/ML
CALCIUM SPEC-SCNC: 9.3 MG/DL (ref 8.6–10.5)
CANNABINOIDS SERPL QL: NEGATIVE
CHLORIDE SERPL-SCNC: 104 MMOL/L (ref 98–110)
CHOLEST SERPL-MCNC: 225 MG/DL (ref 130–200)
CLARITY UR: CLEAR
CO2 SERPL-SCNC: 27 MMOL/L (ref 24–31)
COCAINE UR QL: NEGATIVE
COLOR UR: YELLOW
CREAT SERPL-MCNC: 0.8 MG/DL (ref 0.5–1.4)
EGFRCR SERPLBLD CKD-EPI 2021: 88.2 ML/MIN/1.73
ERYTHROCYTE [DISTWIDTH] IN BLOOD BY AUTOMATED COUNT: 13 % (ref 12.3–15.4)
ERYTHROCYTE [SEDIMENTATION RATE] IN BLOOD: 29 MM/HR (ref 0–30)
GLOBULIN UR ELPH-MCNC: 3.5 GM/DL
GLUCOSE SERPL-MCNC: 115 MG/DL (ref 65–99)
GLUCOSE UR STRIP-MCNC: NEGATIVE MG/DL
HBA1C MFR BLD: 5.4 % (ref 4.8–5.9)
HCT VFR BLD AUTO: 40.4 % (ref 34–46.6)
HDLC SERPL-MCNC: 81 MG/DL
HGB BLD-MCNC: 12.8 G/DL (ref 12–15.9)
HGB UR QL STRIP.AUTO: NEGATIVE
HYALINE CASTS UR QL AUTO: ABNORMAL /LPF
KETONES UR QL STRIP: NEGATIVE
LDLC SERPL CALC-MCNC: 113 MG/DL (ref 0–99)
LDLC/HDLC SERPL: 1.32 {RATIO}
LEUKOCYTE ESTERASE UR QL STRIP.AUTO: ABNORMAL
LYMPHOCYTES # BLD AUTO: 2.4 10*3/MM3 (ref 0.7–3.1)
LYMPHOCYTES NFR BLD AUTO: 29.9 % (ref 19.6–45.3)
MCH RBC QN AUTO: 29.6 PG (ref 26.6–33)
MCHC RBC AUTO-ENTMCNC: 31.7 G/DL (ref 31.5–35.7)
MCV RBC AUTO: 93.3 FL (ref 79–97)
METHADONE UR QL SCN: NEGATIVE
NEUTROPHILS NFR BLD AUTO: 5.3 10*3/MM3 (ref 1.7–7)
NEUTROPHILS NFR BLD AUTO: 66.6 % (ref 42.7–76)
NITRITE UR QL STRIP: NEGATIVE
OPIATES UR QL: NEGATIVE
OXYCODONE UR QL SCN: NEGATIVE
PCP UR QL SCN: NEGATIVE
PH UR STRIP.AUTO: 6 [PH] (ref 5–8)
PLATELET # BLD AUTO: 227 10*3/MM3 (ref 140–450)
PMV BLD AUTO: 10.7 FL (ref 6–12)
POTASSIUM SERPL-SCNC: 4.3 MMOL/L (ref 3.5–5.3)
PROT SERPL-MCNC: 7.9 G/DL (ref 6.3–8.7)
PROT UR QL STRIP: NEGATIVE
RBC # BLD AUTO: 4.33 10*6/MM3 (ref 3.77–5.28)
RBC # UR STRIP: ABNORMAL /HPF
REF LAB TEST METHOD: ABNORMAL
SODIUM SERPL-SCNC: 141 MMOL/L (ref 135–145)
SP GR UR STRIP: 1.01 (ref 1–1.03)
SQUAMOUS #/AREA URNS HPF: ABNORMAL /HPF
TRICYCLICS UR QL SCN: NEGATIVE
TRIGL SERPL-MCNC: 185 MG/DL (ref 0–149)
UROBILINOGEN UR QL STRIP: ABNORMAL
VLDLC SERPL-MCNC: 31 MG/DL (ref 5–40)
WBC # UR STRIP: ABNORMAL /HPF
WBC NRBC COR # BLD AUTO: 8 10*3/MM3 (ref 3.4–10.8)

## 2025-01-02 PROCEDURE — 80053 COMPREHEN METABOLIC PANEL: CPT

## 2025-01-02 PROCEDURE — 81001 URINALYSIS AUTO W/SCOPE: CPT

## 2025-01-02 PROCEDURE — 87086 URINE CULTURE/COLONY COUNT: CPT

## 2025-01-02 PROCEDURE — 80307 DRUG TEST PRSMV CHEM ANLYZR: CPT

## 2025-01-02 PROCEDURE — 80050 GENERAL HEALTH PANEL: CPT

## 2025-01-02 PROCEDURE — 80061 LIPID PANEL: CPT

## 2025-01-02 PROCEDURE — 85025 COMPLETE CBC W/AUTO DIFF WBC: CPT

## 2025-01-02 PROCEDURE — 36415 COLL VENOUS BLD VENIPUNCTURE: CPT

## 2025-01-02 PROCEDURE — 84439 ASSAY OF FREE THYROXINE: CPT

## 2025-01-02 PROCEDURE — 82306 VITAMIN D 25 HYDROXY: CPT

## 2025-01-02 PROCEDURE — 85652 RBC SED RATE AUTOMATED: CPT

## 2025-01-02 PROCEDURE — 86140 C-REACTIVE PROTEIN: CPT

## 2025-01-02 PROCEDURE — 86038 ANTINUCLEAR ANTIBODIES: CPT

## 2025-01-02 PROCEDURE — 82607 VITAMIN B-12: CPT

## 2025-01-02 PROCEDURE — 86431 RHEUMATOID FACTOR QUANT: CPT

## 2025-01-02 PROCEDURE — 83036 HEMOGLOBIN GLYCOSYLATED A1C: CPT

## 2025-01-02 PROCEDURE — 86803 HEPATITIS C AB TEST: CPT

## 2025-01-02 PROCEDURE — 84550 ASSAY OF BLOOD/URIC ACID: CPT

## 2025-01-02 RX ORDER — CEFDINIR 300 MG/1
300 CAPSULE ORAL 2 TIMES DAILY
Qty: 20 CAPSULE | Refills: 0 | Status: SHIPPED | OUTPATIENT
Start: 2025-01-02

## 2025-01-02 RX ORDER — METHYLPREDNISOLONE 4 MG/1
TABLET ORAL
Qty: 1 EACH | Refills: 0 | Status: SHIPPED | OUTPATIENT
Start: 2025-01-02

## 2025-01-02 RX ORDER — CHOLECALCIFEROL (VITAMIN D3) 1250 MCG
50000 CAPSULE ORAL
Qty: 6 CAPSULE | Refills: 3 | Status: SHIPPED | OUTPATIENT
Start: 2025-01-02

## 2025-01-02 RX ORDER — BUSPIRONE HYDROCHLORIDE 5 MG/1
5 TABLET ORAL 3 TIMES DAILY
Qty: 90 TABLET | Refills: 2 | Status: SHIPPED | OUTPATIENT
Start: 2025-01-02

## 2025-01-02 RX ORDER — CLOBETASOL PROPIONATE 0.5 MG/G
CREAM TOPICAL 2 TIMES DAILY
Qty: 60 G | Refills: 5 | Status: SHIPPED | OUTPATIENT
Start: 2025-01-02

## 2025-01-02 NOTE — TELEPHONE ENCOUNTER
Caller: Nicolas Amaro    Relationship to patient: Self      Best call back number: [phone]     Provider: ALDO VILLAVICENCIO     Medication PA needed: VYVANSE     Reason for call/Prior Auth:     PATIENT STATES SHE HAS A NEW PHARMACY AND INSURANCE, AND BELIEVES SHE MAY NEED A PRIOR AUTHORIZATION FOR VYVANSE.

## 2025-01-02 NOTE — PROGRESS NOTES
"Chief Complaint  Cough, Nasal Congestion, ADHD, Anxiety, and Annual Exam    Subjective    History of Present Illness      Patient presents to [facility] for   History of Present Illness  Pt presents today for 8 month follow up on anxiety and ADHD. Been off of it due to insurance change. States she's been having cough and congestion since the 20th       Review of Systems    I have reviewed and agree with the HPI information as above.  Hortensia Abraham, APRN     Objective   Vital Signs:   /85   Pulse 91   Ht 154.9 cm (60.98\")   Wt 91.7 kg (202 lb 3.2 oz)   SpO2 99%   BMI 38.23 kg/m²            Physical Exam  Vitals and nursing note reviewed.   Constitutional:       Appearance: Normal appearance. She is well-developed.   HENT:      Head: Normocephalic and atraumatic.      Right Ear: Tympanic membrane, ear canal and external ear normal.      Left Ear: Tympanic membrane, ear canal and external ear normal.      Nose: Nose normal. No septal deviation, nasal tenderness or congestion.      Mouth/Throat:      Lips: Pink. No lesions.      Mouth: Mucous membranes are moist. No oral lesions.      Dentition: Normal dentition.      Pharynx: Oropharynx is clear. No pharyngeal swelling, oropharyngeal exudate or posterior oropharyngeal erythema.   Eyes:      General: Lids are normal. Vision grossly intact. No scleral icterus.        Right eye: No discharge.         Left eye: No discharge.      Extraocular Movements: Extraocular movements intact.      Conjunctiva/sclera: Conjunctivae normal.      Right eye: Right conjunctiva is not injected.      Left eye: Left conjunctiva is not injected.      Pupils: Pupils are equal, round, and reactive to light.   Neck:      Thyroid: No thyroid mass.      Trachea: Trachea normal.   Cardiovascular:      Rate and Rhythm: Normal rate and regular rhythm.      Heart sounds: Normal heart sounds. No murmur heard.     No gallop.   Pulmonary:      Effort: " Pulmonary effort is normal.      Breath sounds: Normal breath sounds and air entry. No wheezing, rhonchi or rales.   Musculoskeletal:         General: No tenderness or deformity. Normal range of motion.      Cervical back: Full passive range of motion without pain, normal range of motion and neck supple.      Thoracic back: Normal.      Right lower leg: No edema.      Left lower leg: No edema.   Skin:     General: Skin is warm and dry.      Coloration: Skin is not jaundiced.      Findings: No rash.   Neurological:      Mental Status: She is alert and oriented to person, place, and time.      Sensory: Sensation is intact.      Motor: Motor function is intact.      Coordination: Coordination is intact.      Gait: Gait is intact.      Deep Tendon Reflexes: Reflexes are normal and symmetric.   Psychiatric:         Mood and Affect: Mood and affect normal.         Behavior: Behavior normal.         Judgment: Judgment normal.          JING-7:      PHQ-2 Depression Screening    Little interest or pleasure in doing things? Not at all   Feeling down, depressed, or hopeless? Not at all   PHQ-2 Total Score 0      PHQ-9 Depression Screening  Little interest or pleasure in doing things? Not at all   Feeling down, depressed, or hopeless? Not at all   PHQ-2 Total Score 0   Trouble falling or staying asleep, or sleeping too much?     Feeling tired or having little energy?     Poor appetite or overeating?     Feeling bad about yourself - or that you are a failure or have let yourself or your family down?     Trouble concentrating on things, such as reading the newspaper or watching television?     Moving or speaking so slowly that other people could have noticed? Or the opposite - being so fidgety or restless that you have been moving around a lot more than usual?     Thoughts that you would be better off dead, or of hurting yourself in some way?     PHQ-9 Total Score     If you checked off any problems, how difficult have these  problems made it for you to do your work, take care of things at home, or get along with other people? Not difficult at all           Result Review  Data Reviewed:                   Assessment and Plan      Diagnoses and all orders for this visit:    1. Wellness examination (Primary)  Comments:  Wellness exam. labs today  cologaurd ordered  mammogram ordered  Orders:  -     Hemoglobin A1c; Future  -     Urinalysis With Culture If Indicated - Urine, Clean Catch; Future  -     CBC Auto Differential; Future  -     Comprehensive Metabolic Panel; Future  -     Lipid Panel; Future  -     Urinalysis With Culture If Indicated -; Future  -     TSH; Future  -     Vitamin D,25-Hydroxy; Future    2. Psoriasis palmaris  Comments:  requesting derm appointment  Orders:  -     Hemoglobin A1c; Future  -     Urinalysis With Culture If Indicated - Urine, Clean Catch; Future  -     CBC Auto Differential; Future  -     Comprehensive Metabolic Panel; Future  -     Lipid Panel; Future  -     Urinalysis With Culture If Indicated -; Future  -     TSH; Future  -     Vitamin D,25-Hydroxy; Future  -     methylPREDNISolone (MEDROL) 4 MG dose pack; Take as directed on package instructions.  Dispense: 1 each; Refill: 0  -     clobetasol propionate (TEMOVATE) 0.05 % cream; Apply  topically to the appropriate area as directed 2 (Two) Times a Day.  Dispense: 60 g; Refill: 5  -     Ambulatory Referral to Dermatology    3. Vitamin D deficiency  -     Vitamin D,25-Hydroxy; Future  -     Cholecalciferol (Vitamin D3) 1.25 MG (73438 UT) capsule; Take 1 capsule by mouth Every 30 (Thirty) Days.  Dispense: 6 capsule; Refill: 3    4. Arthralgia, unspecified joint  Comments:  concered about psoriatic arthritis. will get labs today  Orders:  -     TATI by IFA, Reflex to Titer and Pattern; Future  -     C-reactive Protein; Future  -     Rheumatoid Factor; Future  -     Sedimentation Rate; Future  -     Uric Acid; Future  -     methylPREDNISolone (MEDROL) 4 MG  dose pack; Take as directed on package instructions.  Dispense: 1 each; Refill: 0    5. Encounter for hepatitis C screening test for low risk patient  -     Hepatitis C antibody; Future    6. Encounter for screening mammogram for malignant neoplasm of breast  Comments:  order placed  Orders:  -     Mammo Screening Digital Tomosynthesis Bilateral With CAD; Future    7. Colon cancer screening  Comments:  cologaurd  Orders:  -     Cologuard - Stool, Per Rectum; Future    8. Adult attention deficit hyperactivity disorder  Comments:  wants to restart ADHD meds.  UDS today  Orders:  -     Urine Drug Screen - Urine, Clean Catch; Future    9. Acute sinusitis, recurrence not specified, unspecified location  Comments:  c/o cough and congestion  Orders:  -     cefdinir (OMNICEF) 300 MG capsule; Take 1 capsule by mouth 2 (Two) Times a Day.  Dispense: 20 capsule; Refill: 0    10. Anxiety  -     busPIRone (BUSPAR) 5 MG tablet; Take 1 tablet by mouth 3 (Three) Times a Day.  Dispense: 90 tablet; Refill: 2    11. Vitamin D deficiency  Comments:  Refill for 6 weeks then she can start otc.  Orders:  -     Vitamin D,25-Hydroxy; Future  -     Cholecalciferol (Vitamin D3) 1.25 MG (48701 UT) capsule; Take 1 capsule by mouth Every 30 (Thirty) Days.  Dispense: 6 capsule; Refill: 3            Follow Up   Return in about 1 month (around 2/2/2025) for Recheck, ADHD follow up, Mood follow up.  Patient was given instructions and counseling regarding her condition or for health maintenance advice. Please see specific information pulled into the AVS if appropriate.

## 2025-01-03 LAB
25(OH)D3 SERPL-MCNC: 44.4 NG/ML (ref 30–100)
CHROMATIN AB SERPL-ACNC: <10 IU/ML (ref 0–14)
CRP SERPL-MCNC: 1.12 MG/DL (ref 0–0.5)
FENTANYL UR-MCNC: NEGATIVE NG/ML
HCV AB SER QL: NORMAL
T4 FREE SERPL-MCNC: 2.04 NG/DL (ref 0.92–1.68)
TSH SERPL DL<=0.05 MIU/L-ACNC: 0.02 UIU/ML (ref 0.27–4.2)
URATE SERPL-MCNC: 4.5 MG/DL (ref 2.4–5.7)
VIT B12 BLD-MCNC: 958 PG/ML (ref 211–946)

## 2025-01-03 RX ORDER — LISDEXAMFETAMINE DIMESYLATE 50 MG/1
50 CAPSULE ORAL EVERY MORNING
Qty: 30 CAPSULE | Refills: 0 | Status: SHIPPED | OUTPATIENT
Start: 2025-01-30 | End: 2025-03-01

## 2025-01-03 RX ORDER — LISDEXAMFETAMINE DIMESYLATE 50 MG/1
50 CAPSULE ORAL EVERY MORNING
Qty: 30 CAPSULE | Refills: 0 | Status: SHIPPED | OUTPATIENT
Start: 2025-01-03 | End: 2025-02-02

## 2025-01-04 LAB — BACTERIA SPEC AEROBE CULT: NO GROWTH

## 2025-01-06 DIAGNOSIS — F90.9 ADULT ATTENTION DEFICIT HYPERACTIVITY DISORDER: ICD-10-CM

## 2025-01-06 LAB — ANA SER QL IF: NEGATIVE

## 2025-01-06 NOTE — TELEPHONE ENCOUNTER
Was waiting on UDS to come back, PA submitted today   -c/w Donepezil  -fall precautions  -supportive care -Improved VTE score of 2 (age and immobilization)  -Heparin sq for DVT ppx -Improved VTE score of 2 (age and immobilization)  -Heparin sq for DVT ppx

## 2025-01-06 NOTE — TELEPHONE ENCOUNTER
PA response: Your PA request has been closed. LISDEXAMFETAMINE DIMESYLATE 50MG CAP #30/30 days // Mock Claim Paid // Reviewed with SR [employer]-mock claim pays out 3 months.

## 2025-01-06 NOTE — TELEPHONE ENCOUNTER
Caller: Nicolas Amaro    Relationship to patient: Self    Best call back number: [phone]    Patient is needing: PATIENT STATED HER MEDICATION     lisdexamfetamine (Vyvanse) 50 MG capsule   HAS BEEN SENT TO THE WRONG PHARMACY. IT IS CHEAPER FOR HER TO HAVE FILLED AT   [facility]/pharmacy #[id number] - [place], [place] - [address] AT [address]OSS FROM [address] - [phone]  - [phone]  [phone]

## 2025-01-07 RX ORDER — LISDEXAMFETAMINE DIMESYLATE 50 MG/1
50 CAPSULE ORAL EVERY MORNING
Qty: 30 CAPSULE | Refills: 0 | Status: SHIPPED | OUTPATIENT
Start: 2025-01-30 | End: 2025-03-01

## 2025-01-07 RX ORDER — LISDEXAMFETAMINE DIMESYLATE 50 MG/1
50 CAPSULE ORAL EVERY MORNING
Qty: 30 CAPSULE | Refills: 0 | Status: SHIPPED | OUTPATIENT
Start: 2025-01-07 | End: 2025-02-06

## 2025-01-07 NOTE — PROGRESS NOTES
TSH is HYPER but this is managed by endocrinology  Cholesterol is elevated, trigs elevated, ldl elevated  Urine showed bacteria, but was contaminate  Arthritis labs neg  A1c great at 5.4  Cbc normal  Cmp stable  Vitamin d great  UDS is appropriate. Medications have been routed to Dr Rincon to sign.

## 2025-01-08 ENCOUNTER — TELEPHONE (OUTPATIENT)
Dept: FAMILY MEDICINE CLINIC | Facility: CLINIC | Age: 55
End: 2025-01-08
Payer: COMMERCIAL

## 2025-01-08 ENCOUNTER — TRANSCRIBE ORDERS (OUTPATIENT)
Dept: ADMINISTRATIVE | Facility: HOSPITAL | Age: 55
End: 2025-01-08
Payer: COMMERCIAL

## 2025-01-08 DIAGNOSIS — E06.3 CHRONIC LYMPHOCYTIC THYROIDITIS: ICD-10-CM

## 2025-01-08 DIAGNOSIS — L40.50 PSORIATIC ARTHRITIS: Primary | ICD-10-CM

## 2025-01-08 DIAGNOSIS — E66.812 CLASS 2 SEVERE OBESITY DUE TO EXCESS CALORIES WITH SERIOUS COMORBIDITY AND BODY MASS INDEX (BMI) OF 38.0 TO 38.9 IN ADULT: ICD-10-CM

## 2025-01-08 DIAGNOSIS — E05.00 BASEDOW'S DISEASE: Primary | ICD-10-CM

## 2025-01-08 DIAGNOSIS — E66.01 CLASS 2 SEVERE OBESITY DUE TO EXCESS CALORIES WITH SERIOUS COMORBIDITY AND BODY MASS INDEX (BMI) OF 38.0 TO 38.9 IN ADULT: ICD-10-CM

## 2025-01-08 RX ORDER — SEMAGLUTIDE 0.25 MG/.5ML
0.25 INJECTION, SOLUTION SUBCUTANEOUS WEEKLY
Qty: 2 ML | Refills: 2 | Status: SHIPPED | OUTPATIENT
Start: 2025-01-08

## 2025-01-08 NOTE — TELEPHONE ENCOUNTER
Called pt, name/ verified.   Results relayed. She would like to move forward with Rheumatology referral.  She would also like to see if insurance will cover Wegovy for weight loss.      ----- Message from Hortensia Abraham sent at 2025  8:20 AM CST -----  TSH is HYPER but this is managed by endocrinology  Cholesterol is elevated, trigs elevated, ldl elevated  Urine showed bacteria, but was contaminate  Arthritis labs neg  A1c great at 5.4  Cbc normal  Cmp stable  Vitamin d great  UDS is appropriate. Medications have been routed to Dr Rincon to sign.

## 2025-01-09 LAB — T4 FREE SERPL DIALY-MCNC: 1.9 NG/DL

## 2025-01-13 DIAGNOSIS — E66.812 CLASS 2 SEVERE OBESITY DUE TO EXCESS CALORIES WITH SERIOUS COMORBIDITY AND BODY MASS INDEX (BMI) OF 38.0 TO 38.9 IN ADULT: ICD-10-CM

## 2025-01-13 DIAGNOSIS — E66.01 CLASS 2 SEVERE OBESITY DUE TO EXCESS CALORIES WITH SERIOUS COMORBIDITY AND BODY MASS INDEX (BMI) OF 38.0 TO 38.9 IN ADULT: ICD-10-CM

## 2025-01-13 NOTE — TELEPHONE ENCOUNTER
Caller: Prem Nicolas ADITYA    Relationship: Self    Best call back number:     [phone]       Requested Prescriptions:   Requested Prescriptions     Pending Prescriptions Disp Refills    Semaglutide-Weight Management (Wegovy) 0.25 MG/0.5ML solution auto-injector 2 mL 2     Sig: Inject 0.5 mL under the skin into the appropriate area as directed 1 (One) Time Per Week.        Pharmacy where request should be sent: [facility] #[id number] - [place], [place] - [address] [facility] [phone] [facility] [phone]      Last office visit with prescribing clinician: 1/2/2025   Last telemedicine visit with prescribing clinician: Visit date not found   Next office visit with prescribing clinician: Visit date not found     Additional details provided by patient: WAS SENT TO THE WRONG PHARMACY. ALSO IS REQUESTING PRIOR AUTHORIZATION BE SENT.     Does the patient have less than a 3 day supply:  [x] Yes  [] No    Would you like a call back once the refill request has been completed: [x] Yes [] No    If the office needs to give you a call back, can they leave a voicemail: [x] Yes [] No    Valerie Mora Rep   01/13/25 12:31 CST

## 2025-01-14 ENCOUNTER — PATIENT MESSAGE (OUTPATIENT)
Dept: FAMILY MEDICINE CLINIC | Facility: CLINIC | Age: 55
End: 2025-01-14
Payer: COMMERCIAL

## 2025-01-14 RX ORDER — SEMAGLUTIDE 0.25 MG/.5ML
0.25 INJECTION, SOLUTION SUBCUTANEOUS WEEKLY
Qty: 2 ML | Refills: 2 | Status: SHIPPED | OUTPATIENT
Start: 2025-01-14

## 2025-01-14 NOTE — TELEPHONE ENCOUNTER
I'm working on this PA one of the questions is: Has the patient participated in a comprehensive weight management program that encourages behavioral modification, reduced-calorie diet, AND increased physical activity with continuing follow-up for at least 6 months prior to using drug therapy?*    So I have called the pt and she states that she has used the Creabilis lynn, the healthy wage lynn and in the past was prescribed phentermine by Dr. Kincaid.     Is there anyway you can document this in her chart?

## 2025-01-25 DIAGNOSIS — M25.50 ARTHRALGIA, UNSPECIFIED JOINT: ICD-10-CM

## 2025-01-25 DIAGNOSIS — L40.3 PSORIASIS PALMARIS: ICD-10-CM

## 2025-01-28 RX ORDER — METHYLPREDNISOLONE 4 MG/1
TABLET ORAL
Qty: 1 EACH | Refills: 0 | Status: SHIPPED | OUTPATIENT
Start: 2025-01-28

## 2025-02-09 ENCOUNTER — HOSPITAL ENCOUNTER (EMERGENCY)
Facility: HOSPITAL | Age: 55
Discharge: HOME | End: 2025-02-09
Admitting: STUDENT IN AN ORGANIZED HEALTH CARE EDUCATION/TRAINING PROGRAM
Payer: COMMERCIAL

## 2025-02-09 ENCOUNTER — APPOINTMENT (OUTPATIENT)
Dept: GENERAL RADIOLOGY | Facility: HOSPITAL | Age: 55
End: 2025-02-09
Payer: COMMERCIAL

## 2025-02-09 ENCOUNTER — OFFICE VISIT (OUTPATIENT)
Age: 55
End: 2025-02-09

## 2025-02-09 VITALS
RESPIRATION RATE: 20 BRPM | TEMPERATURE: 97.8 F | HEART RATE: 99 BPM | SYSTOLIC BLOOD PRESSURE: 146 MMHG | DIASTOLIC BLOOD PRESSURE: 95 MMHG | OXYGEN SATURATION: 93 % | BODY MASS INDEX: 35.68 KG/M2 | HEIGHT: 61 IN | WEIGHT: 189 LBS

## 2025-02-09 DIAGNOSIS — Z53.21 PATIENT LEFT WITHOUT BEING SEEN: Primary | ICD-10-CM

## 2025-02-09 DIAGNOSIS — J10.1 INFLUENZA A: Primary | ICD-10-CM

## 2025-02-09 LAB
B PARAPERT DNA SPEC QL NAA+PROBE: NOT DETECTED
B PERT DNA SPEC QL NAA+PROBE: NOT DETECTED
C PNEUM DNA NPH QL NAA+NON-PROBE: NOT DETECTED
FLUAV H1 2009 PAND RNA NPH QL NAA+PROBE: DETECTED
FLUBV RNA ISLT QL NAA+PROBE: NOT DETECTED
HADV DNA SPEC NAA+PROBE: NOT DETECTED
HCOV 229E RNA SPEC QL NAA+PROBE: NOT DETECTED
HCOV HKU1 RNA SPEC QL NAA+PROBE: NOT DETECTED
HCOV NL63 RNA SPEC QL NAA+PROBE: NOT DETECTED
HCOV OC43 RNA SPEC QL NAA+PROBE: NOT DETECTED
HMPV RNA NPH QL NAA+NON-PROBE: NOT DETECTED
HPIV1 RNA ISLT QL NAA+PROBE: NOT DETECTED
HPIV2 RNA SPEC QL NAA+PROBE: NOT DETECTED
HPIV3 RNA NPH QL NAA+PROBE: NOT DETECTED
HPIV4 P GENE NPH QL NAA+PROBE: NOT DETECTED
M PNEUMO IGG SER IA-ACNC: NOT DETECTED
RHINOVIRUS RNA SPEC NAA+PROBE: NOT DETECTED
RSV RNA NPH QL NAA+NON-PROBE: NOT DETECTED
S PYO AG THROAT QL: NEGATIVE
SARS-COV-2 RNA RESP QL NAA+PROBE: NOT DETECTED

## 2025-02-09 PROCEDURE — 71045 X-RAY EXAM CHEST 1 VIEW: CPT

## 2025-02-09 PROCEDURE — 87081 CULTURE SCREEN ONLY: CPT | Performed by: STUDENT IN AN ORGANIZED HEALTH CARE EDUCATION/TRAINING PROGRAM

## 2025-02-09 PROCEDURE — 63710000001 ONDANSETRON ODT 4 MG TABLET DISPERSIBLE: Performed by: NURSE PRACTITIONER

## 2025-02-09 PROCEDURE — 96372 THER/PROPH/DIAG INJ SC/IM: CPT

## 2025-02-09 PROCEDURE — 87880 STREP A ASSAY W/OPTIC: CPT | Performed by: STUDENT IN AN ORGANIZED HEALTH CARE EDUCATION/TRAINING PROGRAM

## 2025-02-09 PROCEDURE — 25010000002 KETOROLAC TROMETHAMINE PER 15 MG: Performed by: NURSE PRACTITIONER

## 2025-02-09 PROCEDURE — 99283 EMERGENCY DEPT VISIT LOW MDM: CPT

## 2025-02-09 PROCEDURE — 0202U NFCT DS 22 TRGT SARS-COV-2: CPT | Performed by: STUDENT IN AN ORGANIZED HEALTH CARE EDUCATION/TRAINING PROGRAM

## 2025-02-09 RX ORDER — CETIRIZINE HYDROCHLORIDE, PSEUDOEPHEDRINE HYDROCHLORIDE 5; 120 MG/1; MG/1
1 TABLET, FILM COATED, EXTENDED RELEASE ORAL 2 TIMES DAILY
Qty: 15 TABLET | Refills: 0 | Status: SHIPPED | OUTPATIENT
Start: 2025-02-09

## 2025-02-09 RX ORDER — ONDANSETRON 4 MG/1
4 TABLET, ORALLY DISINTEGRATING ORAL ONCE
Status: COMPLETED | OUTPATIENT
Start: 2025-02-09 | End: 2025-02-09

## 2025-02-09 RX ORDER — OSELTAMIVIR PHOSPHATE 75 MG/1
75 CAPSULE ORAL 2 TIMES DAILY
Qty: 10 CAPSULE | Refills: 0 | Status: SHIPPED | OUTPATIENT
Start: 2025-02-09 | End: 2025-02-14

## 2025-02-09 RX ORDER — FLUTICASONE PROPIONATE 50 MCG
2 SPRAY, SUSPENSION (ML) NASAL DAILY
Qty: 9.9 G | Refills: 0 | Status: SHIPPED | OUTPATIENT
Start: 2025-02-09

## 2025-02-09 RX ORDER — ONDANSETRON 4 MG/1
4 TABLET, ORALLY DISINTEGRATING ORAL EVERY 6 HOURS PRN
Qty: 15 TABLET | Refills: 0 | Status: SHIPPED | OUTPATIENT
Start: 2025-02-09

## 2025-02-09 RX ORDER — BENZONATATE 200 MG/1
200 CAPSULE ORAL 3 TIMES DAILY PRN
Qty: 15 CAPSULE | Refills: 0 | Status: SHIPPED | OUTPATIENT
Start: 2025-02-09

## 2025-02-09 RX ORDER — KETOROLAC TROMETHAMINE 30 MG/ML
30 INJECTION, SOLUTION INTRAMUSCULAR; INTRAVENOUS ONCE
Status: COMPLETED | OUTPATIENT
Start: 2025-02-09 | End: 2025-02-09

## 2025-02-09 RX ORDER — METHYLPREDNISOLONE 4 MG/1
TABLET ORAL
Qty: 21 EACH | Refills: 0 | Status: SHIPPED | OUTPATIENT
Start: 2025-02-09

## 2025-02-09 RX ADMIN — ONDANSETRON 4 MG: 4 TABLET, ORALLY DISINTEGRATING ORAL at 18:19

## 2025-02-09 RX ADMIN — KETOROLAC TROMETHAMINE 30 MG: 30 INJECTION, SOLUTION INTRAMUSCULAR; INTRAVENOUS at 18:21

## 2025-02-09 NOTE — Clinical Note
[facility] EMERGENCY DEPARTMENT  [address] [place] [address] AVE  [facility] [address]  Phone: [phone]    Nicolas Amaro was seen and treated in our emergency department on 2/9/2025.  She may return to work on 02/17/2025.         Thank you for choosing [facility].    Tatyana Wong, APRN

## 2025-02-09 NOTE — Clinical Note
[facility] EMERGENCY DEPARTMENT  [address] [place] [address] AVE  [facility] [address]  Phone: [phone]    Nicolas Amaro was seen and treated in our emergency department on 2/9/2025.  She may return to work on 02/17/2025.         Thank you for choosing [facility].    Tatyana Wong, APRN

## 2025-02-10 NOTE — ED PROVIDER NOTES
Subjective   History of Present Illness  Patient is a 54-year-old female who presents to the ER with complaints of cough with congestion.  Patient states that she has had bodyaches, cough with congestion, and fatigue that began last night.  She reports chills as well as subjective fevers.  She reports a mild sore throat.  She believes she has the flu.  Due to symptoms described she came the ER for evaluation and treatment.  Past medical history significant for anxiety, autoimmune thyroiditis, fatigue, Graves' disease, Hashimoto's thyroiditis, migraines, psoriasis        Review of Systems   Constitutional:  Positive for fatigue.   HENT:  Positive for congestion, rhinorrhea and sore throat.    Respiratory:  Positive for cough. Negative for shortness of breath.    Gastrointestinal:  Positive for nausea. Negative for abdominal pain, constipation, diarrhea and vomiting.   Genitourinary: Negative.  Negative for dysuria.   Musculoskeletal:  Positive for myalgias.   Skin: Negative.  Negative for rash.   Neurological:  Positive for headaches.   All other systems reviewed and are negative.      Past Medical History:   Diagnosis Date    Anxiety     Autoimmune thyroiditis     Fatigue     Graves disease     Graves disease     Graves disease     Hashimoto's thyroiditis     History of cold-induced urticaria     Migraines     Migraines     Multiple exostosis, hereditary     Osteochondral defect     Psoriasis        Allergies   Allergen Reactions    Phenergan [Promethazine] Anxiety    Sulfa Antibiotics Hives and Rash       Past Surgical History:   Procedure Laterality Date    ORTHOPEDIC SURGERY      left arm surgeries x 3 and left ring finger     TUBAL ABDOMINAL LIGATION         Family History   Problem Relation Age of Onset    Kidney disease Father     Diabetes Son     Heart disease Maternal Grandmother     Cancer Paternal Grandfather     Breast cancer Neg Hx     Ovarian cancer Neg Hx     Uterine cancer Neg Hx     Colon cancer Neg  Hx        Social History     Socioeconomic History    Marital status: Single   Tobacco Use    Smoking status: Never     Passive exposure: Never    Smokeless tobacco: Never   Vaping Use    Vaping status: Never Used   Substance and Sexual Activity    Alcohol use: No    Drug use: No    Sexual activity: Never           Objective   Physical Exam  Vitals and nursing note reviewed.   Constitutional:       General: She is not in acute distress.     Appearance: She is well-developed. She is ill-appearing. She is not toxic-appearing or diaphoretic.      Comments: Nontoxic-appearing, appears as if she does not feel well   HENT:      Head: Normocephalic and atraumatic.      Right Ear: Tympanic membrane, ear canal and external ear normal.      Left Ear: Tympanic membrane, ear canal and external ear normal.      Nose: Nose normal.      Mouth/Throat:      Pharynx: Oropharynx is clear.   Eyes:      Extraocular Movements: Extraocular movements intact.      Conjunctiva/sclera: Conjunctivae normal.      Pupils: Pupils are equal, round, and reactive to light.   Cardiovascular:      Rate and Rhythm: Normal rate and regular rhythm.      Pulses: Normal pulses.      Heart sounds: Normal heart sounds.   Pulmonary:      Effort: Pulmonary effort is normal.      Breath sounds: Normal breath sounds.   Abdominal:      General: Bowel sounds are normal.      Palpations: Abdomen is soft.      Tenderness: There is no abdominal tenderness. There is no guarding.   Musculoskeletal:         General: Normal range of motion.      Cervical back: Normal range of motion and neck supple.   Skin:     General: Skin is warm and dry.   Neurological:      Mental Status: She is alert and oriented to person, place, and time.   Psychiatric:         Mood and Affect: Mood normal.         Behavior: Behavior normal.         Thought Content: Thought content normal.         Judgment: Judgment normal.         Procedures           ED Course                                                        Medical Decision Making  Patient is a 54-year-old female who presents to the ER with complaints of cough with congestion.  Patient states that she has had bodyaches, cough with congestion, and fatigue that began last night.  She reports chills as well as subjective fevers.  She reports a mild sore throat.  She believes she has the flu.  Due to symptoms described she came the ER for evaluation and treatment.  Past medical history significant for anxiety, autoimmune thyroiditis, fatigue, Graves' disease, Hashimoto's thyroiditis, migraines, psoriasis    Differential diagnosis includes but not limited to viral illness, pneumonia, strep throat, and other etiologies    Problems Addressed:  Influenza A: complicated acute illness or injury    Risk  OTC drugs.  Prescription drug management.      Labs Reviewed   RESPIRATORY PANEL PCR W/ COVID-19 (SARS-COV-2), NP SWAB IN UTM/VTP, 2 HR TAT - Abnormal; Notable for the following components:       Result Value    Influenza A H1 2009 PCR Detected (*)     All other components within normal limits    Narrative:     In the setting of a positive respiratory panel with a viral infection PLUS a negative procalcitonin without other underlying concern for bacterial infection, consider observing off antibiotics or discontinuation of antibiotics and continue supportive care. If the respiratory panel is positive for atypical bacterial infection (Bordetella pertussis, Chlamydophila pneumoniae, or Mycoplasma pneumoniae), consider antibiotic de-escalation to target atypical bacterial infection.   RAPID STREP A SCREEN - Normal   BETA HEMOLYTIC STREP CULTURE, THROAT      XR Chest 1 View   Final Result       1.  No acute cardiopulmonary process.               This report was signed and finalized on 2/9/2025 6:40 PM by Dr. Ashwin Finn MD.          Patient tested positive for influenza A.  Rapid strep was negative.  Patient requesting Tamiflu prescription.  Will prescribe  medication for her flulike symptoms and recommend close follow-up with PCP for reevaluation.  She will be discharged home shortly in stable condition.  Final diagnoses:   Influenza A       ED Disposition  ED Disposition       ED Disposition   Discharge    Condition   Good    Comment   --               No follow-up provider specified.       Medication List        New Prescriptions      benzonatate 200 MG capsule  Commonly known as: TESSALON  Take 1 capsule by mouth 3 (Three) Times a Day As Needed for Cough.     cetirizine-pseudoephedrine 5-120 MG per 12 hr tablet  Commonly known as: ZyrTEC-D  Take 1 tablet by mouth 2 (Two) Times a Day.     fluticasone 50 MCG/ACT nasal spray  Commonly known as: FLONASE  Administer 2 sprays into the nostril(s) as directed by provider Daily.     ondansetron ODT 4 MG disintegrating tablet  Commonly known as: ZOFRAN-ODT  Place 1 tablet on the tongue Every 6 (Six) Hours As Needed for Nausea.     oseltamivir 75 MG capsule  Commonly known as: TAMIFLU  Take 1 capsule by mouth 2 (Two) Times a Day for 5 days.               Where to Get Your Medications        These medications were sent to [facility]/pharmacy #[id number] - [place], [place] - [address] AT [address] FROM [address] - [phone]  - [phone]   [address] [facility] [address]      Phone: [phone]   benzonatate 200 MG capsule  cetirizine-pseudoephedrine 5-120 MG per 12 hr tablet  fluticasone 50 MCG/ACT nasal spray  methylPREDNISolone 4 MG dose pack  ondansetron ODT 4 MG disintegrating tablet  oseltamivir 75 MG capsule            Tatyana Wong, APRN  02/10/25 [id number]

## 2025-02-12 LAB — BACTERIA SPEC AEROBE CULT: NORMAL

## 2025-02-13 ENCOUNTER — TELEPHONE (OUTPATIENT)
Dept: FAMILY MEDICINE CLINIC | Facility: CLINIC | Age: 55
End: 2025-02-13
Payer: COMMERCIAL

## 2025-02-13 RX ORDER — DEXTROMETHORPHAN HYDROBROMIDE AND PROMETHAZINE HYDROCHLORIDE 15; 6.25 MG/5ML; MG/5ML
5 SYRUP ORAL 4 TIMES DAILY PRN
Qty: 180 ML | Refills: 0 | Status: SHIPPED | OUTPATIENT
Start: 2025-02-13

## 2025-02-13 RX ORDER — BROMPHENIRAMINE MALEATE, PSEUDOEPHEDRINE HYDROCHLORIDE, AND DEXTROMETHORPHAN HYDROBROMIDE 2; 30; 10 MG/5ML; MG/5ML; MG/5ML
5 SYRUP ORAL 4 TIMES DAILY PRN
Qty: 118 ML | Refills: 0 | Status: SHIPPED | OUTPATIENT
Start: 2025-02-13

## 2025-02-13 NOTE — TELEPHONE ENCOUNTER
Caller: Nicolas Amaro    Relationship: Self    Best call back number: [phone]    What medication are you requesting:     ANTIBIOTIC    What are your current symptoms:     WENT TO  ER ON SUNDAY 02.09.25. HAS FLU A.    HAS NOW HAD A LOT OF GREEN MUCOUS WHEN BLOWING HER NOSE.  PAST COUPLE OF DAYS. DOES SHE NEED AN ANTIBIOTIC?    ALSO, COUGH MEDICATION DOES NOT SEEM TO MAKE A DIFFERENCE    If a prescription is needed, what is your preferred pharmacy and phone number:      [facility]/pharmacy #[id number] - [place] - [address] AT [address] FROM [address] - [phone] [facility] [phone]     PLEASE CALL AND ADVISE PATIENT

## 2025-02-14 NOTE — TELEPHONE ENCOUNTER
Caller: Nicolas Amaro    Relationship: Self    Best call back number: [phone]       What was the call regarding: promethazine-dextromethorphan (PROMETHAZINE-DM) 6.25-15 MG/5ML syrup THAT WAS CALLED IN TODAY AND PATIENT SAYS THAT SHE IS ALLERGIC TO THIS MEDICATION AND IS REQUESTING A DIFFERENT MEDICATION BE CALLED IN INSTEAD. PLEASE CALL TO ADVISE.       
Pt informed  
Patient

## 2025-02-17 DIAGNOSIS — F90.9 ADULT ATTENTION DEFICIT HYPERACTIVITY DISORDER: ICD-10-CM

## 2025-02-17 DIAGNOSIS — E66.812 CLASS 2 SEVERE OBESITY DUE TO EXCESS CALORIES WITH SERIOUS COMORBIDITY AND BODY MASS INDEX (BMI) OF 38.0 TO 38.9 IN ADULT: ICD-10-CM

## 2025-02-17 DIAGNOSIS — E66.01 CLASS 2 SEVERE OBESITY DUE TO EXCESS CALORIES WITH SERIOUS COMORBIDITY AND BODY MASS INDEX (BMI) OF 38.0 TO 38.9 IN ADULT: ICD-10-CM

## 2025-02-17 RX ORDER — SEMAGLUTIDE 0.25 MG/.5ML
0.25 INJECTION, SOLUTION SUBCUTANEOUS WEEKLY
Qty: 2 ML | Refills: 2 | OUTPATIENT
Start: 2025-02-17

## 2025-02-17 RX ORDER — LISDEXAMFETAMINE DIMESYLATE 50 MG/1
50 CAPSULE ORAL EVERY MORNING
Qty: 30 CAPSULE | Refills: 0 | OUTPATIENT
Start: 2025-02-17 | End: 2025-03-19

## 2025-02-17 NOTE — TELEPHONE ENCOUNTER
"RELAY    \"You are due for a follow up appointment after starting Wegovy. Refills for other medications will be sent at that time as well.\"         "

## 2025-02-17 NOTE — TELEPHONE ENCOUNTER
Caller: Nicolas Amaro    Relationship: Self    Best call back number:     [phone]       Requested Prescriptions:   Requested Prescriptions     Pending Prescriptions Disp Refills    lisdexamfetamine (Vyvanse) 50 MG capsule 30 capsule 0     Sig: Take 1 capsule by mouth Every Morning for 30 days    Semaglutide-Weight Management (Wegovy) 0.25 MG/0.5ML solution auto-injector 2 mL 2     Sig: Inject 0.5 mL under the skin into the appropriate area as directed 1 (One) Time Per Week.        Pharmacy where request should be sent: [facility]/PHARMACY #[id number] - [place], [place] - [address] AT [address] FROM [address]  [phone] [facility] [phone]      Last office visit with prescribing clinician: 1/2/2025   Last telemedicine visit with prescribing clinician: Visit date not found   Next office visit with prescribing clinician: Visit date not found     Additional details provided by patient: OUT    Does the patient have less than a 3 day supply:  [x] Yes  [] No    Would you like a call back once the refill request has been completed: [] Yes [x] No    If the office needs to give you a call back, can they leave a voicemail: [] Yes [x] No    Valerie Prescott Rep   02/17/25 10:17 CST

## 2025-03-03 ENCOUNTER — HOSPITAL ENCOUNTER (OUTPATIENT)
Dept: MAMMOGRAPHY | Facility: HOSPITAL | Age: 55
Discharge: HOME | End: 2025-03-03
Payer: COMMERCIAL

## 2025-03-14 ENCOUNTER — TELEMEDICINE (OUTPATIENT)
Dept: FAMILY MEDICINE CLINIC | Facility: CLINIC | Age: 55
End: 2025-03-14
Payer: COMMERCIAL

## 2025-03-14 VITALS — WEIGHT: 187 LBS | HEIGHT: 61 IN | BODY MASS INDEX: 35.3 KG/M2

## 2025-03-14 DIAGNOSIS — E66.01 CLASS 2 SEVERE OBESITY DUE TO EXCESS CALORIES WITH SERIOUS COMORBIDITY AND BODY MASS INDEX (BMI) OF 35.0 TO 35.9 IN ADULT: ICD-10-CM

## 2025-03-14 DIAGNOSIS — F90.9 ADULT ATTENTION DEFICIT HYPERACTIVITY DISORDER: Primary | ICD-10-CM

## 2025-03-14 DIAGNOSIS — Z71.3 ENCOUNTER FOR WEIGHT LOSS COUNSELING: ICD-10-CM

## 2025-03-14 DIAGNOSIS — F41.9 ANXIETY: ICD-10-CM

## 2025-03-14 DIAGNOSIS — E66.812 CLASS 2 SEVERE OBESITY DUE TO EXCESS CALORIES WITH SERIOUS COMORBIDITY AND BODY MASS INDEX (BMI) OF 35.0 TO 35.9 IN ADULT: ICD-10-CM

## 2025-03-14 DIAGNOSIS — R11.0 NAUSEA: ICD-10-CM

## 2025-03-14 PROCEDURE — 99214 OFFICE O/P EST MOD 30 MIN: CPT

## 2025-03-14 RX ORDER — BUSPIRONE HYDROCHLORIDE 5 MG/1
5 TABLET ORAL 3 TIMES DAILY
Qty: 90 TABLET | Refills: 2 | Status: SHIPPED | OUTPATIENT
Start: 2025-03-14

## 2025-03-14 RX ORDER — ONDANSETRON 4 MG/1
4 TABLET, ORALLY DISINTEGRATING ORAL EVERY 8 HOURS PRN
Qty: 40 TABLET | Refills: 1 | Status: SHIPPED | OUTPATIENT
Start: 2025-03-14

## 2025-03-14 RX ORDER — SEMAGLUTIDE 0.5 MG/.5ML
0.5 INJECTION, SOLUTION SUBCUTANEOUS WEEKLY
Qty: 2 ML | Refills: 2 | Status: SHIPPED | OUTPATIENT
Start: 2025-03-14

## 2025-03-14 NOTE — PROGRESS NOTES
"You have chosen to receive care through a telehealth visit.  Do you consent to use a video/audio connection for your medical care today? Yes     I am located in the office, the patient is located in [place], [place].     Chief Complaint  ADD, Obesity, and Anxiety    Subjective    History of Present Illness      Patient presents to [facility] for   History of Present Illness  ADHD, anxiety, and weight loss follow up.   ADD  Obesity  Anxiety         Review of Systems    I have reviewed and agree with the HPI and ROS information as above.  Jessica ADONIS Greek, APRN     Objective   Vital Signs:   Ht 154.9 cm (61\")   Wt 84.8 kg (187 lb)   BMI 35.33 kg/m²       Physical Exam  Constitutional:       General: She is not in acute distress.     Appearance: Normal appearance. She is not ill-appearing.   HENT:      Head: Normocephalic and atraumatic.   Neurological:      Mental Status: She is alert and oriented to person, place, and time. Mental status is at baseline.      GCS: GCS eye subscore is 4. GCS verbal subscore is 5. GCS motor subscore is 6.   Psychiatric:         Mood and Affect: Mood normal.         Behavior: Behavior normal.         Thought Content: Thought content normal.         Judgment: Judgment normal.          PHQ-2 Depression Screening    Little interest or pleasure in doing things?     Feeling down, depressed, or hopeless?     PHQ-2 Total Score         PHQ-9 Depression Screening  Little interest or pleasure in doing things?     Feeling down, depressed, or hopeless?     PHQ-2 Total Score     Trouble falling or staying asleep, or sleeping too much?     Feeling tired or having little energy?     Poor appetite or overeating?     Feeling bad about yourself - or that you are a failure or have let yourself or your family down?     Trouble concentrating on things, such as reading the newspaper or watching television?     Moving or speaking so slowly that other people could have noticed? Or " the opposite - being so fidgety or restless that you have been moving around a lot more than usual?     Thoughts that you would be better off dead, or of hurting yourself in some way?     PHQ-9 Total Score     If you checked off any problems, how difficult have these problems made it for you to do your work, take care of things at home, or get along with other people?             Result Review  Data Reviewed:            Office Visit with Hortensia Abraham APRN (01/02/2025)   Urine Drug Screen - Urine, Clean Catch (01/02/2025 15:47)   CONTROLLED SUBSTANCE AGREEMENT - SCAN - CSA 01/02/25 (01/15/2025)            Assessment and Plan    Problem List Items Addressed This Visit       Adult attention deficit hyperactivity disorder - Primary    Relevant Medications    busPIRone (BUSPAR) 5 MG tablet    Semaglutide-Weight Management (Wegovy) 0.5 MG/0.5ML solution auto-injector    Anxiety    Relevant Medications    busPIRone (BUSPAR) 5 MG tablet     Other Visit Diagnoses         Class 2 severe obesity due to excess calories with serious comorbidity and body mass index (BMI) of 35.0 to 35.9 in adult        Relevant Medications    Semaglutide-Weight Management (Wegovy) 0.5 MG/0.5ML solution auto-injector      Encounter for weight loss counseling          Nausea        Relevant Medications    ondansetron ODT (ZOFRAN-ODT) 4 MG disintegrating tablet          1.  ADHD.  Well-controlled with Vyvanse 50 mg daily, would like to continue same.  UDS is up-to-date and appropriate, CSA up-to-date, Freedom pending, will pend medication to Dr. Rincon.  She will follow-up in 3 months for recheck.    2.  Anxiety.  Well-controlled with BuSpar 5 mg 3 times daily as needed.  Will continue same.  Denies HI/SI.    3.  Obesity/weight management.  She was started on Wegovy back in January, has been taking the 0.25 mg weekly dose.  She is tolerating it well, occasionally has some nausea on the first day of her injection.  We will send some Zofran  for this.  She feels ready to move up to the 0.5 mg weekly dose.  Recommend continuing diet and exercise changes.  She has lost about 15 pounds since starting.    Plan:  1.  Continue Vyvanse 50 mg daily  2.  Continue BuSpar 5 mg 3 times daily.  3.  Start Zofran 4 mg as needed for nausea  4.  Increase Wegovy to 0.5 mg weekly, continue diet and exercise changes  5.  Follow-up in 3 months        Follow Up   Return in about 3 months (around 6/14/2025).  Patient was given instructions and counseling regarding her condition or for health maintenance advice. Please see specific information pulled into the AVS if appropriate.

## 2025-03-17 RX ORDER — LISDEXAMFETAMINE DIMESYLATE 50 MG/1
50 CAPSULE ORAL EVERY MORNING
Qty: 30 CAPSULE | Refills: 0 | Status: SHIPPED | OUTPATIENT
Start: 2025-04-11 | End: 2025-05-11

## 2025-03-17 RX ORDER — LISDEXAMFETAMINE DIMESYLATE 50 MG/1
50 CAPSULE ORAL EVERY MORNING
Qty: 30 CAPSULE | Refills: 0 | Status: SHIPPED | OUTPATIENT
Start: 2025-03-17 | End: 2025-04-16

## 2025-04-14 ENCOUNTER — LAB (OUTPATIENT)
Dept: LAB | Facility: HOSPITAL | Age: 55
End: 2025-04-14
Payer: COMMERCIAL

## 2025-04-14 DIAGNOSIS — E05.00 BASEDOW'S DISEASE: ICD-10-CM

## 2025-04-14 DIAGNOSIS — E06.3 CHRONIC LYMPHOCYTIC THYROIDITIS: ICD-10-CM

## 2025-04-14 LAB
AUTO MIXED CELLS #: 0.4 10*3/MM3 (ref 0.1–2.6)
AUTO MIXED CELLS %: 6.7 % (ref 0.1–24)
ERYTHROCYTE [DISTWIDTH] IN BLOOD BY AUTOMATED COUNT: 13.3 % (ref 12.3–15.4)
HCT VFR BLD AUTO: 37.4 % (ref 34–46.6)
HGB BLD-MCNC: 12.4 G/DL (ref 12–15.9)
LYMPHOCYTES # BLD AUTO: 1.7 10*3/MM3 (ref 0.7–3.1)
LYMPHOCYTES NFR BLD AUTO: 31.4 % (ref 19.6–45.3)
MCH RBC QN AUTO: 29.8 PG (ref 26.6–33)
MCHC RBC AUTO-ENTMCNC: 33.2 G/DL (ref 31.5–35.7)
MCV RBC AUTO: 89.9 FL (ref 79–97)
NEUTROPHILS NFR BLD AUTO: 3.3 10*3/MM3 (ref 1.7–7)
NEUTROPHILS NFR BLD AUTO: 61.9 % (ref 42.7–76)
PLATELET # BLD AUTO: 223 10*3/MM3 (ref 140–450)
PMV BLD AUTO: 9.9 FL (ref 6–12)
RBC # BLD AUTO: 4.16 10*6/MM3 (ref 3.77–5.28)
WBC NRBC COR # BLD AUTO: 5.4 10*3/MM3 (ref 3.4–10.8)

## 2025-04-14 PROCEDURE — 84439 ASSAY OF FREE THYROXINE: CPT

## 2025-04-14 PROCEDURE — 84481 FREE ASSAY (FT-3): CPT

## 2025-04-14 PROCEDURE — 86376 MICROSOMAL ANTIBODY EACH: CPT

## 2025-04-14 PROCEDURE — 80050 GENERAL HEALTH PANEL: CPT

## 2025-04-14 PROCEDURE — 85025 COMPLETE CBC W/AUTO DIFF WBC: CPT

## 2025-04-14 PROCEDURE — 36415 COLL VENOUS BLD VENIPUNCTURE: CPT

## 2025-04-14 PROCEDURE — 84445 ASSAY OF TSI GLOBULIN: CPT

## 2025-04-15 LAB
ALBUMIN SERPL-MCNC: 4.4 G/DL (ref 3.5–5)
ALBUMIN/GLOB SERPL: 1.5 G/DL (ref 1.1–2.5)
ALP SERPL-CCNC: 94 U/L (ref 24–120)
ALT SERPL W P-5'-P-CCNC: 31 U/L (ref 0–35)
ANION GAP SERPL CALCULATED.3IONS-SCNC: 8 MMOL/L (ref 4–13)
AST SERPL-CCNC: 29 U/L (ref 7–45)
BILIRUB SERPL-MCNC: 0.4 MG/DL (ref 0.1–1)
BUN SERPL-MCNC: 17 MG/DL (ref 5–21)
BUN/CREAT SERPL: 17
CALCIUM SPEC-SCNC: 9.4 MG/DL (ref 8.6–10.5)
CHLORIDE SERPL-SCNC: 99 MMOL/L (ref 98–110)
CO2 SERPL-SCNC: 30 MMOL/L (ref 24–31)
CREAT SERPL-MCNC: 1 MG/DL (ref 0.5–1.4)
EGFRCR SERPLBLD CKD-EPI 2021: 67.5 ML/MIN/1.73
GLOBULIN UR ELPH-MCNC: 2.9 GM/DL
GLUCOSE SERPL-MCNC: 100 MG/DL (ref 65–99)
POTASSIUM SERPL-SCNC: 3.9 MMOL/L (ref 3.5–5.3)
PROT SERPL-MCNC: 7.3 G/DL (ref 6.3–8.7)
SODIUM SERPL-SCNC: 137 MMOL/L (ref 135–145)
T4 FREE SERPL-MCNC: 1.6 NG/DL (ref 0.92–1.68)
TSH SERPL DL<=0.05 MIU/L-ACNC: 0.13 UIU/ML (ref 0.27–4.2)

## 2025-04-16 LAB
T3 SERPL-MCNC: 128 NG/DL (ref 71–180)
T3FREE SERPL-MCNC: 3.7 PG/ML (ref 2–4.4)
THYROPEROXIDASE AB SERPL-ACNC: 427 IU/ML (ref 0–34)

## 2025-04-17 LAB — TSI SER-ACNC: 1.3 IU/L (ref 0–0.55)

## 2025-06-10 DIAGNOSIS — E66.01 CLASS 2 SEVERE OBESITY DUE TO EXCESS CALORIES WITH SERIOUS COMORBIDITY AND BODY MASS INDEX (BMI) OF 35.0 TO 35.9 IN ADULT: Primary | ICD-10-CM

## 2025-06-10 DIAGNOSIS — E66.812 CLASS 2 SEVERE OBESITY DUE TO EXCESS CALORIES WITH SERIOUS COMORBIDITY AND BODY MASS INDEX (BMI) OF 35.0 TO 35.9 IN ADULT: Primary | ICD-10-CM

## 2025-06-10 RX ORDER — SEMAGLUTIDE 1 MG/.5ML
1 INJECTION, SOLUTION SUBCUTANEOUS WEEKLY
Qty: 2 ML | Refills: 2 | Status: SHIPPED | OUTPATIENT
Start: 2025-06-10

## 2025-07-02 ENCOUNTER — TELEPHONE (OUTPATIENT)
Dept: FAMILY MEDICINE CLINIC | Facility: CLINIC | Age: 55
End: 2025-07-02
Payer: COMMERCIAL

## 2025-07-20 ENCOUNTER — NURSE TRIAGE (OUTPATIENT)
Dept: CALL CENTER | Facility: HOSPITAL | Age: 55
End: 2025-07-20
Payer: COMMERCIAL

## 2025-07-20 NOTE — TELEPHONE ENCOUNTER
"Reason for Disposition   [1] SEVERE pain (e.g., excruciating) AND [2] present > 1 hour    Additional Information   Negative: SEVERE difficulty breathing (e.g., struggling for each breath, speaks in single words)   Negative: Shock suspected (e.g., cold/pale/clammy skin, too weak to stand, low BP, rapid pulse)   Negative: Difficult to awaken or acting confused (e.g., disoriented, slurred speech)   Negative: Passed out (i.e., lost consciousness, collapsed and was not responding)   Negative: Visible sweat on face or sweat dripping down face   Negative: Sounds like a life-threatening emergency to the triager   Negative: Followed an abdomen (stomach) injury   Negative: Chest pain   Negative: [1] Abdominal pain AND [2] pregnant < 20 weeks   Negative: [1] Abdominal pain AND [2] pregnant 20 or more weeks   Negative: [1] Abdominal pain AND [2] postpartum (from 0 to 6 weeks after delivery)   Negative: Abdomen bloating or swelling are main symptoms   Negative: [1] Pain lasts > 10 minutes AND [2] age > 50    Answer Assessment - Initial Assessment Questions  1. LOCATION: \"Where does it hurt?\"       Right upper quad pain  2. RADIATION: \"Does the pain shoot anywhere else?\" (e.g., chest, back)      To the back  3. ONSET: \"When did the pain begin?\" (e.g., minutes, hours or days ago)       Several days  4. SUDDEN: \"Gradual or sudden onset?\"      gradual  5. PATTERN \"Does the pain come and go, or is it constant?\"     - If it comes and goes: \"How long does it last?\" \"Do you have pain now?\"      (Note: Comes and goes means the pain is intermittent. It goes away completely between bouts.)     - If constant: \"Is it getting better, staying the same, or getting worse?\"       (Note: Constant means the pain never goes away completely; most serious pain is constant and gets worse.)       constant  6. SEVERITY: \"How bad is the pain?\"  (e.g., Scale 1-10; mild, moderate, or severe)     - MILD (1-3): Doesn't interfere with normal activities, " "abdomen soft and not tender to touch..      - MODERATE (4-7): Interferes with normal activities or awakens from sleep, abdomen tender to touch.      - SEVERE (8-10): Excruciating pain, doubled over, unable to do any normal activities.        severe  7. RECURRENT SYMPTOM: \"Have you ever had this type of stomach pain before?\" If Yes, ask: \"When was the last time?\" and \"What happened that time?\"       denies  8. AGGRAVATING FACTORS: \"Does anything seem to cause this pain?\" (e.g., foods, stress, alcohol)      unknown  9. CARDIAC SYMPTOMS: \"Do you have any of the following symptoms: chest pain, difficulty breathing, sweating, nausea?\"      no  10. OTHER SYMPTOMS: \"Do you have any other symptoms?\" (e.g., back pain, diarrhea, fever, urination pain, vomiting)        diarrhea  11. PREGNANCY: \"Is there any chance you are pregnant?\" \"When was your last menstrual period?\"        NA    Protocols used: Abdominal Pain - Upper-ADULT-AH    "

## 2025-07-25 ENCOUNTER — OFFICE VISIT (OUTPATIENT)
Dept: FAMILY MEDICINE CLINIC | Facility: CLINIC | Age: 55
End: 2025-07-25
Payer: COMMERCIAL

## 2025-07-25 VITALS
OXYGEN SATURATION: 99 % | WEIGHT: 182 LBS | HEART RATE: 73 BPM | DIASTOLIC BLOOD PRESSURE: 85 MMHG | BODY MASS INDEX: 34.36 KG/M2 | SYSTOLIC BLOOD PRESSURE: 148 MMHG | HEIGHT: 61 IN

## 2025-07-25 DIAGNOSIS — F90.9 ADULT ATTENTION DEFICIT HYPERACTIVITY DISORDER: Primary | ICD-10-CM

## 2025-07-25 DIAGNOSIS — E55.9 VITAMIN D DEFICIENCY: ICD-10-CM

## 2025-07-25 DIAGNOSIS — E66.812 CLASS 2 SEVERE OBESITY DUE TO EXCESS CALORIES WITH SERIOUS COMORBIDITY AND BODY MASS INDEX (BMI) OF 38.0 TO 38.9 IN ADULT: ICD-10-CM

## 2025-07-25 DIAGNOSIS — F41.9 ANXIETY: ICD-10-CM

## 2025-07-25 DIAGNOSIS — F90.2 ATTENTION DEFICIT HYPERACTIVITY DISORDER (ADHD), COMBINED TYPE: Primary | ICD-10-CM

## 2025-07-25 DIAGNOSIS — E66.01 CLASS 2 SEVERE OBESITY DUE TO EXCESS CALORIES WITH SERIOUS COMORBIDITY AND BODY MASS INDEX (BMI) OF 38.0 TO 38.9 IN ADULT: ICD-10-CM

## 2025-07-25 PROCEDURE — 99214 OFFICE O/P EST MOD 30 MIN: CPT | Performed by: NURSE PRACTITIONER

## 2025-07-25 RX ORDER — CHOLECALCIFEROL (VITAMIN D3) 1250 MCG
50000 CAPSULE ORAL
Qty: 6 CAPSULE | Refills: 3 | Status: SHIPPED | OUTPATIENT
Start: 2025-07-25

## 2025-07-25 RX ORDER — BUSPIRONE HYDROCHLORIDE 5 MG/1
5 TABLET ORAL 3 TIMES DAILY
Qty: 90 TABLET | Refills: 2 | Status: SHIPPED | OUTPATIENT
Start: 2025-07-25

## 2025-07-25 RX ORDER — SEMAGLUTIDE 0.5 MG/.5ML
0.5 INJECTION, SOLUTION SUBCUTANEOUS WEEKLY
Qty: 2 ML | Refills: 0 | Status: SHIPPED | OUTPATIENT
Start: 2025-07-25

## 2025-07-25 NOTE — PROGRESS NOTES
"Chief Complaint  ADHD and Anxiety    Subjective    History of Present Illness      Patient presents to [facility] for   History of Present Illness  Pt presents today following up on ADHD and weight loss. Pt states wegovy been causing her to have symptoms like nausea, diarrhea and abdominal pain        History of Present Illness      Review of Systems    I have reviewed and agree with the HPI information as above.  Hortensia Abarham, APRN     Objective   Vital Signs:   /85   Pulse 73   Ht 154.9 cm (60.98\")   Wt 82.6 kg (182 lb)   SpO2 99%   BMI 34.41 kg/m²            Physical Exam  Vitals and nursing note reviewed.   Constitutional:       Appearance: Normal appearance. She is well-developed. She is obese.   HENT:      Head: Normocephalic and atraumatic.      Right Ear: Tympanic membrane, ear canal and external ear normal.      Left Ear: Tympanic membrane, ear canal and external ear normal.      Nose: Nose normal. No septal deviation, nasal tenderness or congestion.      Mouth/Throat:      Lips: Pink. No lesions.      Mouth: Mucous membranes are moist. No oral lesions.      Dentition: Normal dentition.      Pharynx: Oropharynx is clear. No pharyngeal swelling, oropharyngeal exudate or posterior oropharyngeal erythema.   Eyes:      General: Lids are normal. Vision grossly intact. No scleral icterus.        Right eye: No discharge.         Left eye: No discharge.      Extraocular Movements: Extraocular movements intact.      Conjunctiva/sclera: Conjunctivae normal.      Right eye: Right conjunctiva is not injected.      Left eye: Left conjunctiva is not injected.      Pupils: Pupils are equal, round, and reactive to light.   Neck:      Thyroid: No thyroid mass.      Trachea: Trachea normal.   Cardiovascular:      Rate and Rhythm: Normal rate and regular rhythm.      Heart sounds: Normal heart sounds. No murmur heard.     No gallop.   Pulmonary:      Effort: Pulmonary effort " is normal.      Breath sounds: Normal breath sounds and air entry. No wheezing, rhonchi or rales.   Musculoskeletal:         General: No tenderness or deformity. Normal range of motion.      Cervical back: Full passive range of motion without pain, normal range of motion and neck supple.      Thoracic back: Normal.      Right lower leg: No edema.      Left lower leg: No edema.   Skin:     General: Skin is warm and dry.      Coloration: Skin is not jaundiced.      Findings: No rash.   Neurological:      Mental Status: She is alert and oriented to person, place, and time.      Sensory: Sensation is intact.      Motor: Motor function is intact.      Coordination: Coordination is intact.      Gait: Gait is intact.      Deep Tendon Reflexes: Reflexes are normal and symmetric.   Psychiatric:         Mood and Affect: Mood and affect normal.         Behavior: Behavior normal.         Judgment: Judgment normal.                    Result Review  Data Reviewed:                   Assessment and Plan      Diagnoses and all orders for this visit:    1. Adult attention deficit hyperactivity disorder (Primary)  Comments:  vyvanse 50 was too much. causing dryness and worsening psoriasis. wants to try 40 mg    2. Class 2 severe obesity due to excess calories with serious comorbidity and body mass index (BMI) of 38.0 to 38.9 in adult  Comments:  wegovy 1mg was causing severe stomach pain.   decrease back down to 0.5mg  Orders:  -     Semaglutide-Weight Management (Wegovy) 0.5 MG/0.5ML solution auto-injector; Inject 0.5 mL under the skin into the appropriate area as directed 1 (One) Time Per Week.  Dispense: 2 mL; Refill: 0    3. Anxiety  -     busPIRone (BUSPAR) 5 MG tablet; Take 1 tablet by mouth 3 (Three) Times a Day.  Dispense: 90 tablet; Refill: 2    4. Vitamin D deficiency  -     Cholecalciferol (Vitamin D3) 1.25 MG (50528 UT) capsule; Take 1 capsule by mouth Every 30 (Thirty) Days.  Dispense: 6 capsule; Refill: 3      ADHD  Follow up:    PDMP reviewed and is clean. ADRS (Adult ADHD Assessment Form) reviewed in detail, scanned in chart, and has been reviewed at time of appointment.  All questions, including medication and side effects, were discussed in detail at time of patient's visit. Patient will change medication dose which was discussed at today's visit. Patient is to return in 3 month(s).    Last Urine Toxicity  More data exists         Latest Ref Rng & Units 1/2/2025 6/17/2023   LAST URINE TOXICITY RESULTS   Amphetamine, Urine Qual Negative Negative  Negative    Barbiturates Screen, Urine Negative Negative  Negative    Benzodiazepine Screen, Urine Negative Negative  Negative    Buprenorphine, Screen, Urine Negative Negative  Negative    Cocaine Screen, Urine Negative Negative  Negative    Fentanyl, Urine Negative Negative  Negative    Methadone Screen , Urine Negative Negative  Negative    Methamphetamine, Ur Negative Negative  Negative         Urine Drug Screen Results: UDS RESULTS: Current results appropriate    CSA completed on: 1/2/25   Assessment & Plan          Follow Up   Return in about 3 months (around 10/25/2025) for Recheck, ADHD follow up.  Patient was given instructions and counseling regarding her condition or for health maintenance advice. Please see specific information pulled into the AVS if appropriate.

## 2025-07-28 RX ORDER — LISDEXAMFETAMINE DIMESYLATE 40 MG/1
40 CAPSULE ORAL EVERY MORNING
Qty: 30 CAPSULE | Refills: 0 | Status: SHIPPED | OUTPATIENT
Start: 2025-08-22 | End: 2025-09-21

## 2025-07-28 RX ORDER — LISDEXAMFETAMINE DIMESYLATE 40 MG/1
40 CAPSULE ORAL EVERY MORNING
Qty: 30 CAPSULE | Refills: 0 | Status: SHIPPED | OUTPATIENT
Start: 2025-07-28 | End: 2025-08-27

## 2025-08-25 ENCOUNTER — LAB (OUTPATIENT)
Dept: LAB | Facility: HOSPITAL | Age: 55
End: 2025-08-25
Payer: COMMERCIAL

## 2025-08-25 ENCOUNTER — TRANSCRIBE ORDERS (OUTPATIENT)
Dept: ADMINISTRATIVE | Facility: HOSPITAL | Age: 55
End: 2025-08-25
Payer: COMMERCIAL

## 2025-08-25 DIAGNOSIS — E05.00 GRAVES DISEASE: Primary | ICD-10-CM

## 2025-08-25 DIAGNOSIS — E06.3 HASHIMOTO'S DISEASE: ICD-10-CM

## 2025-08-25 DIAGNOSIS — E05.00 GRAVES DISEASE: ICD-10-CM

## 2025-08-25 LAB
ALBUMIN SERPL-MCNC: 4 G/DL (ref 3.5–5)
ALBUMIN/GLOB SERPL: 1.3 G/DL (ref 1.1–2.5)
ALP SERPL-CCNC: 198 U/L (ref 24–120)
ALT SERPL W P-5'-P-CCNC: 76 U/L (ref 0–35)
ANION GAP SERPL CALCULATED.3IONS-SCNC: 6 MMOL/L (ref 4–13)
AST SERPL-CCNC: 38 U/L (ref 7–45)
AUTO MIXED CELLS #: 0.5 10*3/MM3 (ref 0.1–2.6)
AUTO MIXED CELLS %: 7.3 % (ref 0.1–24)
BILIRUB SERPL-MCNC: 0.2 MG/DL (ref 0.1–1)
BUN SERPL-MCNC: 15 MG/DL (ref 5–21)
BUN/CREAT SERPL: 25
CALCIUM SPEC-SCNC: 9.6 MG/DL (ref 8.6–10.5)
CHLORIDE SERPL-SCNC: 104 MMOL/L (ref 98–110)
CO2 SERPL-SCNC: 29 MMOL/L (ref 24–31)
CREAT SERPL-MCNC: 0.6 MG/DL (ref 0.5–1.4)
EGFRCR SERPLBLD CKD-EPI 2021: 106.8 ML/MIN/1.73
ERYTHROCYTE [DISTWIDTH] IN BLOOD BY AUTOMATED COUNT: 12.5 % (ref 12.3–15.4)
GLOBULIN UR ELPH-MCNC: 3 GM/DL
GLUCOSE SERPL-MCNC: 85 MG/DL (ref 65–99)
HCT VFR BLD AUTO: 36.2 % (ref 34–46.6)
HGB BLD-MCNC: 12.4 G/DL (ref 12–15.9)
LYMPHOCYTES # BLD AUTO: 1.8 10*3/MM3 (ref 0.7–3.1)
LYMPHOCYTES NFR BLD AUTO: 26.5 % (ref 19.6–45.3)
MCH RBC QN AUTO: 30.6 PG (ref 26.6–33)
MCHC RBC AUTO-ENTMCNC: 34.3 G/DL (ref 31.5–35.7)
MCV RBC AUTO: 89.4 FL (ref 79–97)
NEUTROPHILS NFR BLD AUTO: 4.4 10*3/MM3 (ref 1.7–7)
NEUTROPHILS NFR BLD AUTO: 66.2 % (ref 42.7–76)
PLATELET # BLD AUTO: 215 10*3/MM3 (ref 140–450)
PMV BLD AUTO: 9.9 FL (ref 6–12)
POTASSIUM SERPL-SCNC: 4.4 MMOL/L (ref 3.5–5.3)
PROT SERPL-MCNC: 7 G/DL (ref 6.3–8.7)
RBC # BLD AUTO: 4.05 10*6/MM3 (ref 3.77–5.28)
SODIUM SERPL-SCNC: 139 MMOL/L (ref 135–145)
WBC NRBC COR # BLD AUTO: 6.7 10*3/MM3 (ref 3.4–10.8)

## 2025-08-25 PROCEDURE — 80053 COMPREHEN METABOLIC PANEL: CPT

## 2025-08-25 PROCEDURE — 85025 COMPLETE CBC W/AUTO DIFF WBC: CPT

## 2025-08-25 PROCEDURE — 84439 ASSAY OF FREE THYROXINE: CPT

## 2025-08-25 PROCEDURE — 80050 GENERAL HEALTH PANEL: CPT

## 2025-08-25 PROCEDURE — 84481 FREE ASSAY (FT-3): CPT

## 2025-08-25 PROCEDURE — 36415 COLL VENOUS BLD VENIPUNCTURE: CPT

## 2025-08-26 LAB
T4 FREE SERPL-MCNC: 2.53 NG/DL (ref 0.92–1.68)
TSH SERPL DL<=0.05 MIU/L-ACNC: <0.005 UIU/ML (ref 0.27–4.2)

## 2025-08-27 LAB
T3 SERPL-MCNC: 229 NG/DL (ref 71–180)
T3FREE SERPL-MCNC: 7.3 PG/ML (ref 2–4.4)